# Patient Record
Sex: FEMALE | Race: WHITE | NOT HISPANIC OR LATINO | Employment: OTHER | URBAN - METROPOLITAN AREA
[De-identification: names, ages, dates, MRNs, and addresses within clinical notes are randomized per-mention and may not be internally consistent; named-entity substitution may affect disease eponyms.]

---

## 2017-01-23 ENCOUNTER — CLINICAL SUPPORT (OUTPATIENT)
Dept: FAMILY MEDICINE CLINIC | Facility: CLINIC | Age: 67
End: 2017-01-23

## 2017-01-23 DIAGNOSIS — Z78.0 POSTMENOPAUSAL: Primary | ICD-10-CM

## 2017-01-23 DIAGNOSIS — M81.0 OSTEOPOROSIS: ICD-10-CM

## 2017-01-23 DIAGNOSIS — R29.890 HEIGHT LOSS: ICD-10-CM

## 2017-01-23 PROCEDURE — 77080 DXA BONE DENSITY AXIAL: CPT | Performed by: FAMILY MEDICINE

## 2017-01-23 NOTE — MR AVS SNAPSHOT
Carleen Payne   1/23/2017 11:30 AM   Appointment    Provider:  [facility] MARINA BARRIOS   Department:  [facility]   Dept Phone:  [phone]                Your Full Care Plan              Your Updated Medication List          This list is accurate as of: 1/23/17 12:25 PM.  Always use your most recent med list.                atorvastatin 10 MG tablet   Commonly known as:  LIPITOR   Take 1 tablet by mouth Daily.       Calcium 150 MG tablet       gabapentin 300 MG capsule   Commonly known as:  NEURONTIN   Take 1 capsule by mouth 3 (Three) Times a Day. 1 PO QHS for 3 days, 1 PO BID for 3 days, 1 PO TID thereafter       pseudoephedrine-guaifenesin  MG per 12 hr tablet   Commonly known as:  MUCINEX D   Take 1 tablet by mouth Every 12 (Twelve) Hours.       Vitamin D 1000 UNITS tablet               Instructions     None    Patient Instructions History      MyChart Signup     Our records indicate that you have declined [facility] Claim [facility]art signup. If you would like to sign up for Claim Mapshart, please email [email or web address] or call [phone] to obtain an activation code.             Other Info from Your Visit           Your Appointments     Jan 25, 2017  2:15 PM EST   (Arrive by 1:45 PM EST)   Office Visit with Erik Gomes MD   [facility] (--)    [address],  [address] [address] [phone]           Arrive 15 minutes prior to appointment.              Allergies     Erythromycin Base        Vital Signs     Smoking Status                   Never Smoker

## 2017-01-25 ENCOUNTER — HOSPITAL ENCOUNTER (OUTPATIENT)
Dept: GENERAL RADIOLOGY | Facility: HOSPITAL | Age: 67
Discharge: HOME | End: 2017-01-25
Attending: NEUROLOGICAL SURGERY | Admitting: NEUROLOGICAL SURGERY

## 2017-01-25 DIAGNOSIS — M54.16 LUMBAR RADICULOPATHY: ICD-10-CM

## 2017-01-25 PROCEDURE — 72120 X-RAY BEND ONLY L-S SPINE: CPT

## 2017-02-01 ENCOUNTER — OFFICE VISIT (OUTPATIENT)
Dept: NEUROSURGERY | Facility: CLINIC | Age: 67
End: 2017-02-01

## 2017-02-01 VITALS — HEIGHT: 62 IN | TEMPERATURE: 98.2 F | WEIGHT: 125 LBS | BODY MASS INDEX: 23 KG/M2

## 2017-02-01 DIAGNOSIS — M54.16 LUMBAR RADICULOPATHY: Primary | ICD-10-CM

## 2017-02-01 DIAGNOSIS — M51.36 DEGENERATIVE DISC DISEASE, LUMBAR: ICD-10-CM

## 2017-02-01 DIAGNOSIS — M43.16 SPONDYLOLISTHESIS OF LUMBAR REGION: ICD-10-CM

## 2017-02-01 PROCEDURE — 99213 OFFICE O/P EST LOW 20 MIN: CPT | Performed by: NEUROLOGICAL SURGERY

## 2017-02-01 NOTE — PROGRESS NOTES
Patient: Carleen Payne  : 1950    Primary Care Provider: Priyanka Guardado DO    Requesting Provider: As above      History    Chief Complaint: Back and right leg pain.    History of Present Illness: Ms. 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 With lying down or with walking the pain will extend down into the side of her calf and into the side of the right ankle. She typically does not have left-sided symptoms. Sitting is a more comfortable position for her. In the past she did physical therapy which actually aggravated her symptoms. She does use aspirin from time to time. She reports no numbness. There is no history of bowel or bladder dysfunction. Her symptoms are quite significant in that she is unable to sleep with the symptoms and thus she has become very sleep deprived.  She took the Neurontin for 2-3 weeks but developed swelling in her feet and discontinued the medication.  She continues to have severe pain that extends into her right leg and she remains quite sleep deprived.  She does have a history of osteoporosis.    Review of Systems   Constitutional: Negative for activity change, appetite change, chills, diaphoresis, fatigue, fever and unexpected weight change.   HENT: Negative for congestion, dental problem, drooling, ear discharge, ear pain, facial swelling, hearing loss, mouth sores, nosebleeds, postnasal drip, rhinorrhea, sinus pressure, sneezing, sore throat, tinnitus, trouble swallowing and voice change.    Eyes: Negative for photophobia, pain, discharge, redness, itching and visual disturbance.   Respiratory: Negative for apnea, cough, choking, chest tightness, shortness of breath, wheezing and stridor.    Cardiovascular: Negative for chest pain, palpitations and leg swelling.   Gastrointestinal: Negative for abdominal distention, abdominal pain, anal bleeding, blood in stool, constipation, diarrhea, nausea,  rectal pain and vomiting.   Endocrine: Negative for cold intolerance, heat intolerance, polydipsia, polyphagia and polyuria.   Genitourinary: Negative for decreased urine volume, difficulty urinating, dysuria, enuresis, flank pain, frequency, genital sores, hematuria and urgency.   Musculoskeletal: Positive for back pain. Negative for arthralgias, gait problem, joint swelling, myalgias, neck pain and neck stiffness.   Skin: Negative for color change, pallor, rash and wound.   Allergic/Immunologic: Negative for environmental allergies, food allergies and immunocompromised state.   Neurological: Negative for dizziness, tremors, seizures, syncope, facial asymmetry, speech difficulty, weakness, light-headedness, numbness and headaches.   Hematological: Negative for adenopathy. Does not bruise/bleed easily.   Psychiatric/Behavioral: Negative for agitation, behavioral problems, confusion, decreased concentration, dysphoric mood, hallucinations, self-injury, sleep disturbance and suicidal ideas. The patient is not nervous/anxious and is not hyperactive.        The patient's past medical history, past surgical history, family history, and social history have been reviewed at length in the electronic medical record.    Physical Exam:   There were no vitals taken for this visit.  MUSCULOSKELETAL:  Straight leg raising is negative.  Rodrigo's Sign is negative.  ROM in back normal.  Tenderness in the back to palpation is not observed.  NEUROLOGICAL:  Strength is intact in the lower extremities to direct testing.  Muscle tone is normal throughout.  Station and gait are normal.  Sensation is intact to light touch testing throughout.      Medical Decision Making    Data Review:   Plain flexion extension films of the lumbar spine do indeed demonstrate diminished bone density.  There is a prominent grade 1 listhesis of L4 on L5 that reduces slightly in extension.    Diagnosis:   L4-5 spondylolisthesis, stenosis, and  instability.    Treatment Options:   I have referred the patient to pain management for a selective epidural injection or two on the right at L4-5.  She will follow up thereafter.  If she continues to struggle then we will need to consider decompression and PLIF at L4-5.       Diagnosis Plan   1. Lumbar radiculopathy     2. Spondylolisthesis of lumbar region     3. Degenerative disc disease, lumbar       Scribed for Erik Gomes MD by Sharifa Miles CMA on 02/01/2017 at 10:07 AM    I, Dr. Gomes, personally performed the services described in the documentation, as scribed in my presence, and it is both accurate and complete.

## 2017-05-16 ENCOUNTER — OFFICE VISIT (OUTPATIENT)
Dept: FAMILY MEDICINE CLINIC | Facility: CLINIC | Age: 67
End: 2017-05-16

## 2017-05-16 VITALS
DIASTOLIC BLOOD PRESSURE: 88 MMHG | HEART RATE: 82 BPM | SYSTOLIC BLOOD PRESSURE: 140 MMHG | TEMPERATURE: 97.9 F | HEIGHT: 62 IN | OXYGEN SATURATION: 98 % | BODY MASS INDEX: 23.92 KG/M2 | RESPIRATION RATE: 16 BRPM | WEIGHT: 130 LBS

## 2017-05-16 DIAGNOSIS — IMO0001 ELEVATED BP: Primary | ICD-10-CM

## 2017-05-16 PROCEDURE — 99213 OFFICE O/P EST LOW 20 MIN: CPT | Performed by: FAMILY MEDICINE

## 2017-05-17 ENCOUNTER — OFFICE VISIT (OUTPATIENT)
Dept: NEUROSURGERY | Facility: CLINIC | Age: 67
End: 2017-05-17

## 2017-05-17 VITALS — TEMPERATURE: 98.4 F | HEIGHT: 62 IN | BODY MASS INDEX: 22.45 KG/M2 | WEIGHT: 122 LBS

## 2017-05-17 DIAGNOSIS — M43.10 SPONDYLOLISTHESIS, ACQUIRED: ICD-10-CM

## 2017-05-17 DIAGNOSIS — M48.062 LUMBAR STENOSIS WITH NEUROGENIC CLAUDICATION: ICD-10-CM

## 2017-05-17 DIAGNOSIS — M80.08XA PATHOLOGICAL FRACTURE OF VERTEBRA DUE TO OSTEOPOROSIS, UNSPECIFIED OSTEOPOROSIS TYPE, INITIAL ENCOUNTER (HCC): Primary | ICD-10-CM

## 2017-05-17 DIAGNOSIS — M53.2X6 SPINAL INSTABILITY, LUMBAR: ICD-10-CM

## 2017-05-17 PROCEDURE — 99213 OFFICE O/P EST LOW 20 MIN: CPT | Performed by: NEUROLOGICAL SURGERY

## 2017-06-16 ENCOUNTER — OFFICE VISIT (OUTPATIENT)
Dept: NEUROSURGERY | Facility: CLINIC | Age: 67
End: 2017-06-16

## 2017-06-16 VITALS — BODY MASS INDEX: 22.82 KG/M2 | WEIGHT: 124 LBS | TEMPERATURE: 96.4 F | HEIGHT: 62 IN

## 2017-06-16 DIAGNOSIS — M43.10 SPONDYLOLISTHESIS, ACQUIRED: ICD-10-CM

## 2017-06-16 DIAGNOSIS — M80.08XA PATHOLOGICAL FRACTURE OF VERTEBRA DUE TO OSTEOPOROSIS, UNSPECIFIED OSTEOPOROSIS TYPE, INITIAL ENCOUNTER (HCC): Primary | ICD-10-CM

## 2017-06-16 DIAGNOSIS — M54.16 LUMBAR RADICULOPATHY: ICD-10-CM

## 2017-06-16 DIAGNOSIS — M53.2X6 SPINAL INSTABILITY, LUMBAR: ICD-10-CM

## 2017-06-16 DIAGNOSIS — M48.062 LUMBAR STENOSIS WITH NEUROGENIC CLAUDICATION: ICD-10-CM

## 2017-06-16 PROCEDURE — 99212 OFFICE O/P EST SF 10 MIN: CPT | Performed by: NEUROLOGICAL SURGERY

## 2017-06-16 NOTE — PROGRESS NOTES
Patient: Carleen Payne  : 1950    Primary Care Provider: Priyanka Guardado DO    Requesting Provider: As above      History    Chief Complaint: Low back pain.    History of Present Illness: Ms. 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 Some of the pain extended into the side of her right calf and ankle when she was on her feet. Studies demonstrated an L4-5 spondylolisthesis, stenosis, and instability. She saw Dr. Talbert and had some injections done that completely eradicated her pain.  In early May she was lifting a suitcase that turned out to weight 88 pounds and developed severe back pain. She does have a history of osteoporosis. She denies any bowel or bladder dysfunction. She has no lower extremity symptoms. Fortunately, the patient is made progress.  She has some back soreness but none of the severe pain that she previously experienced.     Review of Systems   Constitutional: Negative for activity change, appetite change, chills, diaphoresis, fatigue, fever and unexpected weight change.   HENT: Negative for congestion, dental problem, drooling, ear discharge, ear pain, facial swelling, hearing loss, mouth sores, nosebleeds, postnasal drip, rhinorrhea, sinus pressure, sneezing, sore throat, tinnitus, trouble swallowing and voice change.    Eyes: Negative for photophobia, pain, discharge, redness, itching and visual disturbance.   Respiratory: Negative for apnea, cough, choking, chest tightness, shortness of breath, wheezing and stridor.    Cardiovascular: Negative for chest pain, palpitations and leg swelling.   Gastrointestinal: Negative for abdominal distention, abdominal pain, anal bleeding, blood in stool, constipation, diarrhea, nausea, rectal pain and vomiting.   Musculoskeletal: Negative for arthralgias, back pain, gait problem, joint swelling, myalgias, neck pain and neck  "stiffness.   Skin: Negative for color change, pallor, rash and wound.   Allergic/Immunologic: Negative for environmental allergies, food allergies and immunocompromised state.   Neurological: Negative for dizziness, tremors, seizures, syncope, facial asymmetry, speech difficulty, weakness, light-headedness, numbness and headaches.   Hematological: Negative for adenopathy. Does not bruise/bleed easily.   Psychiatric/Behavioral: Negative for agitation, behavioral problems, confusion, decreased concentration, dysphoric mood, self-injury, sleep disturbance and suicidal ideas. The patient is not nervous/anxious and is not hyperactive.        The patient's past medical history, past surgical history, family history, and social history have been reviewed at length in the electronic medical record.    Physical Exam:   Temp 96.4 °F (35.8 °C)  Ht 62\" (157.5 cm)  Wt 124 lb (56.2 kg)  BMI 22.68 kg/m2  MUSCULOSKELETAL:  Straight leg raising is negative.  Rodrigo's Sign is negative.  ROM in back normal.  Tenderness in the back to palpation is not observed.  NEUROLOGICAL:  Strength is intact in the lower extremities to direct testing.  Muscle tone is normal throughout.  Station and gait are normal.  Sensation is intact to light touch testing throughout.      Medical Decision Making    Diagnosis:   1.  T12 osteoporotic compression fracture.  2.  L4-5 spondylolisthesis, stenosis, and instability.    Treatment Options:   Ms. Payne is doing better.  I believe that her osteoporotic fracture is going to heal on its own over time.  At this juncture she will follow-up with me on an as-needed basis.       Diagnosis Plan   1. Pathological fracture of vertebra due to osteoporosis, unspecified osteoporosis type, initial encounter     2. Spondylolisthesis, acquired     3. Lumbar stenosis with neurogenic claudication     4. Spinal instability, lumbar     5. Lumbar radiculopathy             I, Dr. Gomes, personally performed the services " described in the documentation, as scribed in my presence, and it is both accurate and complete.  Scribed for Erik Gomes MD by Reggie Lilly CMA on 06/16/2017 at 4:38 PM

## 2017-07-11 ENCOUNTER — OFFICE VISIT (OUTPATIENT)
Dept: FAMILY MEDICINE CLINIC | Facility: CLINIC | Age: 67
End: 2017-07-11

## 2017-07-11 VITALS
DIASTOLIC BLOOD PRESSURE: 90 MMHG | RESPIRATION RATE: 16 BRPM | HEIGHT: 62 IN | BODY MASS INDEX: 22.45 KG/M2 | HEART RATE: 80 BPM | OXYGEN SATURATION: 98 % | SYSTOLIC BLOOD PRESSURE: 130 MMHG | WEIGHT: 122 LBS

## 2017-07-11 DIAGNOSIS — R07.81 RIB PAIN ON LEFT SIDE: ICD-10-CM

## 2017-07-11 DIAGNOSIS — I10 ESSENTIAL HYPERTENSION: Primary | ICD-10-CM

## 2017-07-11 DIAGNOSIS — Z13.820 OSTEOPOROSIS SCREENING: ICD-10-CM

## 2017-07-11 PROCEDURE — 99214 OFFICE O/P EST MOD 30 MIN: CPT | Performed by: FAMILY MEDICINE

## 2017-07-11 RX ORDER — METOPROLOL SUCCINATE 50 MG/1
50 TABLET, EXTENDED RELEASE ORAL DAILY
Qty: 30 TABLET | Refills: 3 | Status: SHIPPED | OUTPATIENT
Start: 2017-07-11 | End: 2017-11-11 | Stop reason: SDUPTHER

## 2017-07-11 RX ORDER — GUAIFENESIN AND PSEUDOEPHEDRINE HCL 1200; 120 MG/1; MG/1
1 TABLET, EXTENDED RELEASE ORAL DAILY
Qty: 30 EACH | Refills: 6 | Status: SHIPPED | OUTPATIENT
Start: 2017-07-11 | End: 2018-05-04

## 2017-07-11 NOTE — PROGRESS NOTES
Subjective   Carleen Payne is a 66 y.o. female.     Hypertension   Chronicity: Has had elevated readings at specialist. The current episode started 1 to 4 weeks ago. The problem is unchanged. The problem is controlled. Pertinent negatives include no anxiety, chest pain, malaise/fatigue, neck pain, palpitations, peripheral edema, PND or shortness of breath. There are no associated agents to hypertension. Risk factors for coronary artery disease include family history. Past treatments include nothing. There are no compliance problems.    Had an episode of pain in left mid back. Denies injury or fever. No urinary symptoms. Has chronic constipation. No chest pain or dyspnea. Has pain to palpation. No OTC treatment. Has a history of osteoporosis . Has not been on treatment for years. Has had multiple rib fractures over the past few years.     The following portions of the patient's history were reviewed and updated as appropriate: allergies, current medications, past family history, past medical history, past social history, past surgical history and problem list.    Review of Systems   Constitutional: Negative for appetite change, chills, diaphoresis, fatigue, fever, malaise/fatigue and unexpected weight change.   HENT: Negative for congestion, ear pain, mouth sores, postnasal drip, rhinorrhea, sinus pressure, sneezing, sore throat and trouble swallowing.    Eyes: Negative for pain, redness and visual disturbance.   Respiratory: Negative for apnea, cough, chest tightness, shortness of breath and wheezing.    Cardiovascular: Negative for chest pain, palpitations, leg swelling and PND.   Gastrointestinal: Negative for abdominal distention, blood in stool, constipation, diarrhea and nausea.   Endocrine: Negative for cold intolerance, polydipsia, polyphagia and polyuria.   Genitourinary: Negative for difficulty urinating, dysuria, enuresis, flank pain and urgency.   Musculoskeletal: Negative for arthralgias, back pain,  joint swelling, myalgias and neck pain.   Skin: Negative for color change, rash and wound.   Neurological: Negative for dizziness, syncope, weakness, light-headedness and numbness.   Hematological: Negative for adenopathy.   Psychiatric/Behavioral: Negative for agitation, behavioral problems and confusion. The patient is not nervous/anxious.        Objective   Physical Exam   Constitutional: She is oriented to person, place, and time. She appears well-developed and well-nourished. No distress.   HENT:   Right Ear: External ear normal.   Left Ear: External ear normal.   Nose: Nose normal.   Mouth/Throat: Oropharynx is clear and moist.   Eyes: Conjunctivae and EOM are normal. Pupils are equal, round, and reactive to light.   Neck: Normal range of motion. Neck supple. No thyromegaly present.   Cardiovascular: Normal rate, regular rhythm and normal heart sounds.    No murmur heard.  Pulmonary/Chest: Effort normal and breath sounds normal. No respiratory distress. She has no wheezes.   Abdominal: Soft. Bowel sounds are normal. She exhibits no distension and no mass. There is no tenderness. There is no rebound and no guarding. No hernia.   Musculoskeletal: Normal range of motion. She exhibits no edema or tenderness.   Lymphadenopathy:     She has no cervical adenopathy.   Neurological: She is alert and oriented to person, place, and time. She has normal reflexes.   Skin: Skin is warm and dry. No rash noted. She is not diaphoretic. No erythema. No pallor.   Psychiatric: She has a normal mood and affect. Her behavior is normal. Judgment and thought content normal.   Nursing note and vitals reviewed.      Assessment/Plan   Carleen was seen today for hypertension.    Diagnoses and all orders for this visit:    Essential hypertension    Rib pain on left side  -     XR ribs 2 vw left; Future    Osteoporosis screening    Other orders  -     metoprolol succinate XL (TOPROL XL) 50 MG 24 hr tablet; Take 1 tablet by mouth Daily.  -      Pseudoephedrine-Guaifenesin ER (MUCINEX D MAX STRENGTH) 120-1200 MG tablet sustained-release 12 hour; Take 1 tablet by mouth Daily.      Will start patient on Toprol for blood pressure. I did discuss with patient that the amount of pseudoephedrine in the Mucinex every day that she is taking could be contributing to her blood pressure being elevated. She wants to continue the Mucinex D because of increased snoring and congestion.  Will start pt on Prolia injections for osteoporosis  I personally spent over half of a total 25 minutes face to face with the patient in counseling and discussion and/or coordination of care as described above.

## 2017-07-17 ENCOUNTER — HOSPITAL ENCOUNTER (OUTPATIENT)
Dept: GENERAL RADIOLOGY | Facility: HOSPITAL | Age: 67
Discharge: HOME | End: 2017-07-17
Attending: FAMILY MEDICINE | Admitting: FAMILY MEDICINE

## 2017-07-17 DIAGNOSIS — R07.81 RIB PAIN ON LEFT SIDE: ICD-10-CM

## 2017-07-17 PROCEDURE — 71100 X-RAY EXAM RIBS UNI 2 VIEWS: CPT

## 2017-09-05 ENCOUNTER — OFFICE VISIT (OUTPATIENT)
Dept: FAMILY MEDICINE CLINIC | Facility: CLINIC | Age: 67
End: 2017-09-05

## 2017-09-05 VITALS
BODY MASS INDEX: 22.63 KG/M2 | DIASTOLIC BLOOD PRESSURE: 94 MMHG | HEART RATE: 76 BPM | OXYGEN SATURATION: 98 % | HEIGHT: 62 IN | WEIGHT: 123 LBS | TEMPERATURE: 97.6 F | SYSTOLIC BLOOD PRESSURE: 142 MMHG

## 2017-09-05 DIAGNOSIS — I10 ESSENTIAL HYPERTENSION: Primary | ICD-10-CM

## 2017-09-05 PROCEDURE — 99214 OFFICE O/P EST MOD 30 MIN: CPT | Performed by: FAMILY MEDICINE

## 2017-09-05 RX ORDER — LOSARTAN POTASSIUM 50 MG/1
50 TABLET ORAL DAILY
Qty: 30 TABLET | Refills: 2 | Status: SHIPPED | OUTPATIENT
Start: 2017-09-05 | End: 2017-11-28 | Stop reason: SDUPTHER

## 2017-09-05 NOTE — PROGRESS NOTES
"Subjective   Carleen Payne is a 67 y.o. female.   C/o bilat flank pain, started after vertebral fracture. Had recent negative xrays. Has constipation. Takes stool softener some relief.  Has a history of a vertebral fracture 5/17 and saw neurosurgery and did not have to have \"cement\" in the vertebrae.  Hypertension   Chronicity: Has rcently started on Toprol. The current episode started 1 to 4 weeks ago. The problem is unchanged. The problem is controlled. Pertinent negatives include no anxiety, chest pain, malaise/fatigue, neck pain, palpitations, peripheral edema, PND or shortness of breath. There are no associated agents to hypertension. Risk factors for coronary artery disease include family history. Past treatments include nothing. There are no compliance problems.         The following portions of the patient's history were reviewed and updated as appropriate: allergies, current medications, past family history, past medical history, past social history, past surgical history and problem list.    Review of Systems   Constitutional: Negative.  Negative for malaise/fatigue.   HENT: Negative.    Eyes: Negative.    Respiratory: Negative.  Negative for shortness of breath.    Cardiovascular: Negative.  Negative for chest pain, palpitations and PND.   Gastrointestinal: Negative.    Endocrine: Negative.    Genitourinary: Negative.    Musculoskeletal: Negative.  Negative for neck pain.   Skin: Negative.    Allergic/Immunologic: Negative.    Neurological: Negative.    Hematological: Negative.    Psychiatric/Behavioral: Negative.    All other systems reviewed and are negative.      Objective   Physical Exam   Constitutional: She is oriented to person, place, and time. She appears well-developed and well-nourished. No distress.   HENT:   Right Ear: External ear normal.   Left Ear: External ear normal.   Nose: Nose normal.   Mouth/Throat: Oropharynx is clear and moist.   Eyes: Conjunctivae and EOM are normal. Pupils are " equal, round, and reactive to light.   Neck: Normal range of motion. Neck supple. No thyromegaly present.   Cardiovascular: Normal rate, regular rhythm and normal heart sounds.    No murmur heard.  Pulmonary/Chest: Effort normal and breath sounds normal. No respiratory distress. She has no wheezes.   Abdominal: Soft. Bowel sounds are normal. She exhibits no distension and no mass. There is no tenderness. There is no rebound and no guarding. No hernia.   Musculoskeletal: Normal range of motion. She exhibits no edema or tenderness.   Lymphadenopathy:     She has no cervical adenopathy.   Neurological: She is alert and oriented to person, place, and time. She has normal reflexes.   Skin: Skin is warm and dry. No rash noted. She is not diaphoretic. No erythema. No pallor.   Psychiatric: She has a normal mood and affect. Her behavior is normal. Judgment and thought content normal.   Nursing note and vitals reviewed.      Assessment/Plan   Carleen was seen today for hypertension.    Diagnoses and all orders for this visit:    Essential hypertension    Other orders  -     losartan (COZAAR) 50 MG tablet; Take 1 tablet by mouth Daily.    Patient will follow up with neurosurgery for possible kyphoplasty.  Patient will continue metoprolol. Losartan 50 mg daily has been added today.    I personally spent over half of a total 25 minutes face to face with the patient in counseling and discussion and/or coordination of care as described above.

## 2017-11-11 RX ORDER — METOPROLOL SUCCINATE 50 MG/1
TABLET, EXTENDED RELEASE ORAL
Qty: 30 TABLET | Refills: 3 | Status: SHIPPED | OUTPATIENT
Start: 2017-11-11 | End: 2017-11-28 | Stop reason: SDUPTHER

## 2017-11-28 ENCOUNTER — OFFICE VISIT (OUTPATIENT)
Dept: FAMILY MEDICINE CLINIC | Facility: CLINIC | Age: 67
End: 2017-11-28

## 2017-11-28 VITALS
BODY MASS INDEX: 23.19 KG/M2 | HEART RATE: 74 BPM | HEIGHT: 62 IN | OXYGEN SATURATION: 98 % | SYSTOLIC BLOOD PRESSURE: 128 MMHG | RESPIRATION RATE: 16 BRPM | DIASTOLIC BLOOD PRESSURE: 88 MMHG | WEIGHT: 126 LBS

## 2017-11-28 DIAGNOSIS — Z13.820 OSTEOPOROSIS SCREENING: ICD-10-CM

## 2017-11-28 DIAGNOSIS — R59.1 LYMPHADENOPATHY OF HEAD AND NECK: ICD-10-CM

## 2017-11-28 DIAGNOSIS — I10 ESSENTIAL HYPERTENSION: Primary | ICD-10-CM

## 2017-11-28 PROCEDURE — 99214 OFFICE O/P EST MOD 30 MIN: CPT | Performed by: FAMILY MEDICINE

## 2017-11-28 RX ORDER — CEPHALEXIN 500 MG/1
500 CAPSULE ORAL 2 TIMES DAILY
Qty: 20 CAPSULE | Refills: 0 | Status: SHIPPED | OUTPATIENT
Start: 2017-11-28 | End: 2018-05-04

## 2017-11-28 RX ORDER — ATORVASTATIN CALCIUM 10 MG/1
10 TABLET, FILM COATED ORAL DAILY
Qty: 30 TABLET | Refills: 5 | Status: SHIPPED | OUTPATIENT
Start: 2017-11-28 | End: 2018-06-06 | Stop reason: SDUPTHER

## 2017-11-28 RX ORDER — METOPROLOL SUCCINATE 50 MG/1
50 TABLET, EXTENDED RELEASE ORAL DAILY
Qty: 30 TABLET | Refills: 5 | Status: SHIPPED | OUTPATIENT
Start: 2017-11-28 | End: 2018-09-04 | Stop reason: SDUPTHER

## 2017-11-28 RX ORDER — LOSARTAN POTASSIUM 50 MG/1
50 TABLET ORAL DAILY
Qty: 30 TABLET | Refills: 5 | Status: SHIPPED | OUTPATIENT
Start: 2017-11-28 | End: 2018-06-06 | Stop reason: SDUPTHER

## 2017-11-28 NOTE — PROGRESS NOTES
Subjective   Carleen Payne is a 67 y.o. female.     Hypertension   Chronicity: Has rcently started on Toprol. The current episode started 1 to 4 weeks ago. The problem is unchanged. The problem is controlled. Pertinent negatives include no anxiety, chest pain, malaise/fatigue, neck pain, palpitations, peripheral edema, PND or shortness of breath. There are no associated agents to hypertension. Risk factors for coronary artery disease include family history. Past treatments include nothing. There are no compliance problems.    Has swelling in neck lymph nodes the past 3 weeks causing some pain. Has had some drainage and allergies. Takes Mucinex. Has some ST, no fever or cough.   Has osteoporosis and has been on  A bisphosphenate for > 5 years and pt would like Prolia.    The following portions of the patient's history were reviewed and updated as appropriate: allergies, current medications, past family history, past medical history, past social history, past surgical history and problem list.    Review of Systems   Constitutional: Negative.  Negative for malaise/fatigue.   HENT: Negative.    Eyes: Negative.    Respiratory: Negative.  Negative for shortness of breath.    Cardiovascular: Negative.  Negative for chest pain, palpitations and PND.   Gastrointestinal: Negative.    Endocrine: Negative.    Genitourinary: Negative.    Musculoskeletal: Negative.  Negative for neck pain.   Skin: Negative.    Allergic/Immunologic: Negative.    Neurological: Negative.    Hematological: Negative.    Psychiatric/Behavioral: Negative.    All other systems reviewed and are negative.      Objective   Physical Exam   Constitutional: She is oriented to person, place, and time. She appears well-developed and well-nourished. No distress.   HENT:   Right Ear: External ear normal.   Left Ear: External ear normal.   Nose: Nose normal.   Mouth/Throat: Oropharynx is clear and moist.   Eyes: Conjunctivae and EOM are normal. Pupils are equal,  round, and reactive to light.   Neck: Normal range of motion. Neck supple. No thyromegaly present.   Cardiovascular: Normal rate, regular rhythm and normal heart sounds.    No murmur heard.  Pulmonary/Chest: Effort normal and breath sounds normal. No respiratory distress. She has no wheezes.   Abdominal: Soft. Bowel sounds are normal. She exhibits no distension and no mass. There is no tenderness. There is no rebound and no guarding. No hernia.   Musculoskeletal: Normal range of motion. She exhibits no edema or tenderness.   Lymphadenopathy:     She has no cervical adenopathy.   Neurological: She is alert and oriented to person, place, and time. She has normal reflexes.   Skin: Skin is warm and dry. No rash noted. She is not diaphoretic. No erythema. No pallor.   Psychiatric: She has a normal mood and affect. Her behavior is normal. Judgment and thought content normal.   Nursing note and vitals reviewed.      Assessment/Plan   Carleen was seen today for hypertension.    Diagnoses and all orders for this visit:    Essential hypertension  -     CBC & Differential  -     Comprehensive Metabolic Panel  -     Lipid Panel  -     TSH    Lymphadenopathy of head and neck    Osteoporosis screening    Other orders  -     atorvastatin (LIPITOR) 10 MG tablet; Take 1 tablet by mouth Daily.  -     losartan (COZAAR) 50 MG tablet; Take 1 tablet by mouth Daily.  -     metoprolol succinate XL (TOPROL-XL) 50 MG 24 hr tablet; Take 1 tablet by mouth Daily.  -     cephalexin (KEFLEX) 500 MG capsule; Take 1 capsule by mouth 2 (Two) Times a Day.      Will try to get Prolia approved for patient for osteoporosis.  I personally spent over half of a total 25 minutes face to face with the patient in counseling and discussion and/or coordination of care as described above.

## 2017-11-29 ENCOUNTER — LAB (OUTPATIENT)
Dept: FAMILY MEDICINE CLINIC | Facility: CLINIC | Age: 67
End: 2017-11-29

## 2017-11-29 DIAGNOSIS — I10 ESSENTIAL HYPERTENSION: Primary | ICD-10-CM

## 2017-11-29 LAB
ALBUMIN SERPL-MCNC: 4.3 G/DL (ref 3.2–4.8)
ALBUMIN/GLOB SERPL: 1.8 G/DL (ref 1.5–2.5)
ALP SERPL-CCNC: 75 U/L (ref 25–100)
ALT SERPL W P-5'-P-CCNC: 18 U/L (ref 7–40)
ANION GAP SERPL CALCULATED.3IONS-SCNC: 9 MMOL/L (ref 3–11)
ARTICHOKE IGE QN: 106 MG/DL (ref 0–130)
AST SERPL-CCNC: 24 U/L (ref 0–33)
BASOPHILS # BLD AUTO: 0.03 10*3/MM3 (ref 0–0.2)
BASOPHILS NFR BLD AUTO: 0.4 % (ref 0–1)
BILIRUB SERPL-MCNC: 0.5 MG/DL (ref 0.3–1.2)
BUN BLD-MCNC: 16 MG/DL (ref 9–23)
BUN/CREAT SERPL: 20 (ref 7–25)
CALCIUM SPEC-SCNC: 9.5 MG/DL (ref 8.7–10.4)
CHLORIDE SERPL-SCNC: 101 MMOL/L (ref 99–109)
CHOLEST SERPL-MCNC: 189 MG/DL (ref 0–200)
CO2 SERPL-SCNC: 30 MMOL/L (ref 20–31)
CREAT BLD-MCNC: 0.8 MG/DL (ref 0.6–1.3)
DEPRECATED RDW RBC AUTO: 47.2 FL (ref 37–54)
EOSINOPHIL # BLD AUTO: 0.27 10*3/MM3 (ref 0–0.3)
EOSINOPHIL NFR BLD AUTO: 3.6 % (ref 0–3)
ERYTHROCYTE [DISTWIDTH] IN BLOOD BY AUTOMATED COUNT: 13.5 % (ref 11.3–14.5)
GFR SERPL CREATININE-BSD FRML MDRD: 72 ML/MIN/1.73
GLOBULIN UR ELPH-MCNC: 2.4 GM/DL
GLUCOSE BLD-MCNC: 156 MG/DL (ref 70–100)
HCT VFR BLD AUTO: 42.7 % (ref 34.5–44)
HDLC SERPL-MCNC: 72 MG/DL (ref 40–60)
HGB BLD-MCNC: 14.8 G/DL (ref 11.5–15.5)
IMM GRANULOCYTES # BLD: 0.02 10*3/MM3 (ref 0–0.03)
IMM GRANULOCYTES NFR BLD: 0.3 % (ref 0–0.6)
LYMPHOCYTES # BLD AUTO: 2.35 10*3/MM3 (ref 0.6–4.8)
LYMPHOCYTES NFR BLD AUTO: 31 % (ref 24–44)
MCH RBC QN AUTO: 33.2 PG (ref 27–31)
MCHC RBC AUTO-ENTMCNC: 34.7 G/DL (ref 32–36)
MCV RBC AUTO: 95.7 FL (ref 80–99)
MONOCYTES # BLD AUTO: 0.64 10*3/MM3 (ref 0–1)
MONOCYTES NFR BLD AUTO: 8.5 % (ref 0–12)
NEUTROPHILS # BLD AUTO: 4.26 10*3/MM3 (ref 1.5–8.3)
NEUTROPHILS NFR BLD AUTO: 56.2 % (ref 41–71)
PLATELET # BLD AUTO: 253 10*3/MM3 (ref 150–450)
PMV BLD AUTO: 10.4 FL (ref 6–12)
POTASSIUM BLD-SCNC: 4.6 MMOL/L (ref 3.5–5.5)
PROT SERPL-MCNC: 6.7 G/DL (ref 5.7–8.2)
RBC # BLD AUTO: 4.46 10*6/MM3 (ref 3.89–5.14)
SODIUM BLD-SCNC: 140 MMOL/L (ref 132–146)
TRIGL SERPL-MCNC: 104 MG/DL (ref 0–150)
TSH SERPL DL<=0.05 MIU/L-ACNC: 0.87 MIU/ML (ref 0.35–5.35)
WBC NRBC COR # BLD: 7.57 10*3/MM3 (ref 3.5–10.8)

## 2017-11-29 PROCEDURE — 80061 LIPID PANEL: CPT | Performed by: FAMILY MEDICINE

## 2017-11-29 PROCEDURE — 80053 COMPREHEN METABOLIC PANEL: CPT | Performed by: FAMILY MEDICINE

## 2017-11-29 PROCEDURE — 85025 COMPLETE CBC W/AUTO DIFF WBC: CPT | Performed by: FAMILY MEDICINE

## 2017-11-29 PROCEDURE — 84443 ASSAY THYROID STIM HORMONE: CPT | Performed by: FAMILY MEDICINE

## 2017-11-29 PROCEDURE — 36415 COLL VENOUS BLD VENIPUNCTURE: CPT | Performed by: FAMILY MEDICINE

## 2017-12-08 ENCOUNTER — LAB (OUTPATIENT)
Dept: FAMILY MEDICINE CLINIC | Facility: CLINIC | Age: 67
End: 2017-12-08

## 2017-12-08 DIAGNOSIS — R73.9 HYPERGLYCEMIA: Primary | ICD-10-CM

## 2017-12-08 LAB — HBA1C MFR BLD: 5.3 %

## 2017-12-08 PROCEDURE — 83036 HEMOGLOBIN GLYCOSYLATED A1C: CPT | Performed by: FAMILY MEDICINE

## 2018-05-04 ENCOUNTER — OFFICE VISIT (OUTPATIENT)
Dept: FAMILY MEDICINE CLINIC | Facility: CLINIC | Age: 68
End: 2018-05-04

## 2018-05-04 VITALS
HEART RATE: 72 BPM | DIASTOLIC BLOOD PRESSURE: 86 MMHG | WEIGHT: 129 LBS | BODY MASS INDEX: 23.74 KG/M2 | SYSTOLIC BLOOD PRESSURE: 126 MMHG | OXYGEN SATURATION: 98 % | HEIGHT: 62 IN | RESPIRATION RATE: 16 BRPM

## 2018-05-04 DIAGNOSIS — H02.9 ABNORMALITY OF EYELID: ICD-10-CM

## 2018-05-04 DIAGNOSIS — Z12.31 ENCOUNTER FOR SCREENING MAMMOGRAM FOR BREAST CANCER: ICD-10-CM

## 2018-05-04 DIAGNOSIS — Z00.00 HEALTH CARE MAINTENANCE: Primary | ICD-10-CM

## 2018-05-04 PROCEDURE — G0402 INITIAL PREVENTIVE EXAM: HCPCS | Performed by: FAMILY MEDICINE

## 2018-05-04 PROCEDURE — G0403 EKG FOR INITIAL PREVENT EXAM: HCPCS | Performed by: FAMILY MEDICINE

## 2018-05-04 PROCEDURE — 99212 OFFICE O/P EST SF 10 MIN: CPT | Performed by: FAMILY MEDICINE

## 2018-05-04 RX ORDER — GUAIFENESIN AND PSEUDOEPHEDRINE HCL 1200; 120 MG/1; MG/1
1 TABLET, EXTENDED RELEASE ORAL DAILY
Qty: 30 EACH | Refills: 5 | Status: SHIPPED | OUTPATIENT
Start: 2018-05-04 | End: 2019-05-28

## 2018-05-04 NOTE — PROGRESS NOTES
QUICK REFERENCE INFORMATION:  The ABCs of the Annual Wellness Visit    Initial Medicare Wellness Visit    HEALTH RISK ASSESSMENT    1950    Recent Hospitalizations:  No hospitalization(s) within the last year..        Current Medical Providers:  Patient Care Team:  Priyanka Guardado DO as PCP - General (Family Medicine)  Priyanka Guardado DO as PCP - Claims Attributed  Priyanka Guardado DO as Referring Physician (Family Medicine)  Ophthalmology  Pain treatment      Smoking Status:  History   Smoking Status   • Never Smoker   Smokeless Tobacco   • Never Used       Alcohol Consumption:  History   Alcohol Use No       Depression Screen:   PHQ-2/PHQ-9 Depression Screening 5/16/2017   Little interest or pleasure in doing things 0   Feeling down, depressed, or hopeless 0   Trouble falling or staying asleep, or sleeping too much 0   Feeling tired or having little energy 0   Poor appetite or overeating 0   Feeling bad about yourself - or that you are a failure or have let yourself or your family down 0   Trouble concentrating on things, such as reading the newspaper or watching television 0   Moving or speaking so slowly that other people could have noticed. Or the opposite - being so fidgety or restless that you have been moving around a lot more than usual 0   Thoughts that you would be better off dead, or of hurting yourself in some way 0   Total Score 0   If you checked off any problems, how difficult have these problems made it for you to do your work, take care of things at home, or get along with other people? Not difficult at all       Health Habits and Functional and Cognitive Screening:  Functional & Cognitive Status 5/4/2018   Do you have difficulty preparing food and eating? No   Do you have difficulty bathing yourself, getting dressed or grooming yourself? No   Do you have difficulty using the toilet? No   Do you have difficulty moving around from place to place? No   Do you have trouble with steps or getting out  of a bed or a chair? No   In the past year have you fallen or experienced a near fall? No   Current Diet Unhealthy Diet   Dental Exam Up to date   Eye Exam Up to date   Exercise (times per week) 0 times per week   Current Exercise Activities Include None   Do you need help using the phone?  No   Are you deaf or do you have serious difficulty hearing?  No   Do you need help with transportation? No   Do you need help shopping? No   Do you need help preparing meals?  No   Do you need help with housework?  No   Do you need help with laundry? No   Do you need help taking your medications? No   Do you need help managing money? No   Do you ever drive or ride in a car without wearing a seat belt? No   Have you felt unusual stress, anger or loneliness in the last month? No   Who do you live with? Spouse   If you need help, do you have trouble finding someone available to you? No   Have you been bothered in the last four weeks by sexual problems? No   Do you have difficulty concentrating, remembering or making decisions? No           Does the patient have evidence of cognitive impairment? No    Asiprin use counseling: Start ASA 81 mg daily       Recent Lab Results:    Visual Acuity:   Visual Acuity Screening    Right eye Left eye Both eyes   Without correction:      With correction: 20/20 20/20 20/20   Comments: Per ophth    Hearing Screening Comments: Normal bilat finger rub test    Age-appropriate Screening Schedule:  Refer to the list below for future screening recommendations based on patient's age, sex and/or medical conditions. Orders for these recommended tests are listed in the plan section. The patient has been provided with a written plan.    Health Maintenance   Topic Date Due   • MAMMOGRAM  01/13/2018   • INFLUENZA VACCINE  08/01/2018   • PAP SMEAR  11/01/2018   • LIPID PANEL  11/29/2018   • DXA SCAN  01/23/2019   • COLONOSCOPY  03/24/2021   • TDAP/TD VACCINES (2 - Td) 09/21/2024   • ZOSTER VACCINE  Completed   •  PNEUMOCOCCAL VACCINES (65+ LOW/MEDIUM RISK)  Excluded        Subjective   History of Present Illness    Carleen Payne is a 67 y.o. female who presents for an Annual Wellness Visit.  1. C/o left eye red. Was seen by eye dr and given steroid eye gtts..Still gets red. Pt concerned it is from sun exposure.  The following portions of the patient's history were reviewed and updated as appropriate: allergies, current medications, past family history, past medical history, past social history, past surgical history and problem list.    Outpatient Medications Prior to Visit   Medication Sig Dispense Refill   • atorvastatin (LIPITOR) 10 MG tablet Take 1 tablet by mouth Daily. 30 tablet 5   • Calcium 150 MG tablet Take  by mouth.     • Cholecalciferol (VITAMIN D) 1000 UNITS tablet Take  by mouth.     • losartan (COZAAR) 50 MG tablet Take 1 tablet by mouth Daily. 30 tablet 5   • metoprolol succinate XL (TOPROL-XL) 50 MG 24 hr tablet Take 1 tablet by mouth Daily. 30 tablet 5   • Pseudoephedrine-Guaifenesin ER (MUCINEX D MAX STRENGTH) 120-1200 MG tablet sustained-release 12 hour Take 1 tablet by mouth Daily. 30 each 6   • cephalexin (KEFLEX) 500 MG capsule Take 1 capsule by mouth 2 (Two) Times a Day. 20 capsule 0     No facility-administered medications prior to visit.        Patient Active Problem List   Diagnosis   • Lumbar radiculopathy   • Right sided sciatica   • Leg pain       Advance Care Planning:  has an advance directive - a copy HAS NOT been provided    Identification of Risk Factors:  Risk factors include: weight , unhealthy diet, cardiovascular risk, increased fall risk and polypharmacy.    Review of Systems   Constitutional: Negative.    HENT: Negative.    Eyes: Negative.    Respiratory: Negative.    Cardiovascular: Negative.    Gastrointestinal: Negative.    Endocrine: Negative.    Genitourinary: Negative.    Musculoskeletal: Negative.    Skin: Negative.    Allergic/Immunologic: Negative.    Neurological: Negative.  "   Hematological: Negative.    Psychiatric/Behavioral: Negative.    All other systems reviewed and are negative.      Compared to one year ago, the patient feels her physical health is the same.  Compared to one year ago, the patient feels her mental health is the same.    Objective     Physical Exam   Constitutional: She is oriented to person, place, and time. She appears well-developed and well-nourished. No distress.   HENT:   Right Ear: External ear normal.   Left Ear: External ear normal.   Nose: Nose normal.   Mouth/Throat: Oropharynx is clear and moist.   Eyes: Conjunctivae and EOM are normal. Pupils are equal, round, and reactive to light.   Neck: Normal range of motion. Neck supple. No thyromegaly present.   Cardiovascular: Normal rate, regular rhythm and normal heart sounds.    No murmur heard.  Pulmonary/Chest: Effort normal and breath sounds normal. No respiratory distress. She has no wheezes. She exhibits no mass and no tenderness. Right breast exhibits no inverted nipple and no mass. Left breast exhibits no inverted nipple and no mass.   Abdominal: Soft. Bowel sounds are normal. She exhibits no distension and no mass. There is no tenderness. There is no rebound and no guarding. No hernia.   Musculoskeletal: Normal range of motion. She exhibits no edema or tenderness.   Lymphadenopathy:     She has no cervical adenopathy.   Neurological: She is alert and oriented to person, place, and time. She has normal reflexes.   Skin: Skin is warm and dry. No rash noted. She is not diaphoretic. No erythema. No pallor.   Psychiatric: She has a normal mood and affect. Her behavior is normal. Judgment and thought content normal.   Nursing note and vitals reviewed.      Vitals:    05/04/18 0949   BP: 126/86   Pulse: 72   Resp: 16   SpO2: 98%   Weight: 58.5 kg (129 lb)   Height: 157.5 cm (62\")   PainSc: 0-No pain     ECG 12 Lead  Date/Time: 5/4/2018 10:19 AM  Performed by: BUCK ISAAC  Authorized by: BUCK ISAAC "   Comparison: not compared with previous ECG   Previous ECG: no previous ECG available  Rhythm: sinus rhythm  Rate: normal  BPM: 70  Conduction: conduction normal  ST Segments: ST segments normal  QRS axis: normal  Other: no other findings  Clinical impression: normal ECG            Patient's Body mass index is 23.59 kg/m². BMI is within normal parameters. No follow-up required.      Assessment/Plan   Patient Self-Management and Personalized Health Advice  The patient has been provided with information about: diet, exercise, weight management, prevention of cardiac or vascular disease, the relationship between weight and GERD and fall prevention and preventive services including:   · Counseling for cardiovascular disease risk reduction, Diabetes screening, see lab orders, Exercise counseling provided, Fall Risk assessment done, Nutrition counseling provided.    Visit Diagnoses:    ICD-10-CM ICD-9-CM   1. Health care maintenance Z00.00 V70.0   2. Abnormality of eyelid H02.9 374.9   3. Encounter for screening mammogram for breast cancer Z12.31 V76.12       Orders Placed This Encounter   Procedures   • Mammo Screening Digital Tomosynthesis Bilateral With CAD     Standing Status:   Future     Standing Expiration Date:   5/4/2019     Order Specific Question:   Reason for Exam:     Answer:   screening   • Ambulatory Referral to Ophthalmology     Referral Priority:   Routine     Referral Type:   Consultation     Referral Reason:   Specialty Services Required     Referred to Provider:   Gurvinder Tee MD     Requested Specialty:   Ophthalmology     Number of Visits Requested:   1   • ECG 12 Lead     This order was created via procedure documentation       Outpatient Encounter Prescriptions as of 5/4/2018   Medication Sig Dispense Refill   • atorvastatin (LIPITOR) 10 MG tablet Take 1 tablet by mouth Daily. 30 tablet 5   • Calcium 150 MG tablet Take  by mouth.     • Cholecalciferol (VITAMIN D) 1000 UNITS tablet Take  by  mouth.     • losartan (COZAAR) 50 MG tablet Take 1 tablet by mouth Daily. 30 tablet 5   • metoprolol succinate XL (TOPROL-XL) 50 MG 24 hr tablet Take 1 tablet by mouth Daily. 30 tablet 5   • [DISCONTINUED] Pseudoephedrine-Guaifenesin ER (MUCINEX D MAX STRENGTH) 120-1200 MG tablet sustained-release 12 hour Take 1 tablet by mouth Daily. 30 each 6   • Pseudoephedrine-Guaifenesin ER (MUCINEX D MAX STRENGTH) 120-1200 MG tablet sustained-release 12 hour Take 1 tablet by mouth Daily. 30 each 5   • [DISCONTINUED] cephalexin (KEFLEX) 500 MG capsule Take 1 capsule by mouth 2 (Two) Times a Day. 20 capsule 0     No facility-administered encounter medications on file as of 5/4/2018.        Reviewed use of high risk medication in the elderly: yes  Reviewed for potential of harmful drug interactions in the elderly: yes    Follow Up:  No Follow-up on file.     An After Visit Summary and PPPS with all of these plans were given to the patient.

## 2018-05-15 RX ORDER — NEOMYCIN SULFATE, POLYMYXIN B SULFATE, BACITRACIN ZINC, HYDROCORTISONE 3.5; 10000; 400; 1 MG/G; [USP'U]/G; [USP'U]/G; MG/G
OINTMENT OPHTHALMIC
Qty: 1 TUBE | Refills: 0 | Status: SHIPPED | OUTPATIENT
Start: 2018-05-15 | End: 2018-08-29 | Stop reason: SDUPTHER

## 2018-05-16 ENCOUNTER — TELEPHONE (OUTPATIENT)
Dept: FAMILY MEDICINE CLINIC | Facility: CLINIC | Age: 68
End: 2018-05-16

## 2018-05-16 NOTE — TELEPHONE ENCOUNTER
----- Message from Priyanka Guardado DO sent at 5/15/2018  5:41 PM EDT -----  Regarding: FW: REFERRAL  Contact: [phone]  I will can in gtts  ----- Message -----  From: Vidhya Goldman MA  Sent: 5/15/2018   2:58 PM  To: Priyanka Guardado DO  Subject: FW: REFERRAL                                     Called pt and she stated that her eye has drainage and was curious if you could get her in any sooner ? I advised her that you may not be able to .   ----- Message -----  From: Eri Flores  Sent: 5/15/2018   9:58 AM  To: Vidhya Goldman MA  Subject: REFERRAL                                         PT CANT GET IN TO SEE DR SPIVEY UNTIL July 11,18. SHE WOULD LIKE TO KNOW IF THERE IS ANYTHING THAT CAN BE DONE TO GET IN SOONER.

## 2018-06-07 RX ORDER — ATORVASTATIN CALCIUM 10 MG/1
10 TABLET, FILM COATED ORAL DAILY
Qty: 30 TABLET | Refills: 5 | Status: SHIPPED | OUTPATIENT
Start: 2018-06-07 | End: 2018-11-27 | Stop reason: SDUPTHER

## 2018-06-07 RX ORDER — LOSARTAN POTASSIUM 50 MG/1
50 TABLET ORAL DAILY
Qty: 30 TABLET | Refills: 5 | Status: SHIPPED | OUTPATIENT
Start: 2018-06-07 | End: 2018-11-27 | Stop reason: SDUPTHER

## 2018-06-08 ENCOUNTER — APPOINTMENT (OUTPATIENT)
Dept: MAMMOGRAPHY | Facility: HOSPITAL | Age: 68
End: 2018-06-08
Attending: FAMILY MEDICINE

## 2018-08-13 ENCOUNTER — HOSPITAL ENCOUNTER (OUTPATIENT)
Dept: MAMMOGRAPHY | Facility: HOSPITAL | Age: 68
Discharge: HOME | End: 2018-08-13
Attending: FAMILY MEDICINE | Admitting: FAMILY MEDICINE

## 2018-08-13 DIAGNOSIS — Z12.31 ENCOUNTER FOR SCREENING MAMMOGRAM FOR BREAST CANCER: ICD-10-CM

## 2018-08-13 PROCEDURE — 77067 SCR MAMMO BI INCL CAD: CPT

## 2018-08-13 PROCEDURE — 77063 BREAST TOMOSYNTHESIS BI: CPT | Performed by: RADIOLOGY

## 2018-08-13 PROCEDURE — 77063 BREAST TOMOSYNTHESIS BI: CPT

## 2018-08-13 PROCEDURE — 77067 SCR MAMMO BI INCL CAD: CPT | Performed by: RADIOLOGY

## 2018-08-29 ENCOUNTER — OFFICE VISIT (OUTPATIENT)
Dept: FAMILY MEDICINE CLINIC | Facility: CLINIC | Age: 68
End: 2018-08-29

## 2018-08-29 VITALS
DIASTOLIC BLOOD PRESSURE: 84 MMHG | SYSTOLIC BLOOD PRESSURE: 122 MMHG | HEART RATE: 70 BPM | RESPIRATION RATE: 16 BRPM | BODY MASS INDEX: 22.56 KG/M2 | OXYGEN SATURATION: 98 % | WEIGHT: 122.6 LBS | HEIGHT: 62 IN

## 2018-08-29 DIAGNOSIS — H10.30 ACUTE CONJUNCTIVITIS, UNSPECIFIED ACUTE CONJUNCTIVITIS TYPE, UNSPECIFIED LATERALITY: Primary | ICD-10-CM

## 2018-08-29 PROCEDURE — 99213 OFFICE O/P EST LOW 20 MIN: CPT | Performed by: FAMILY MEDICINE

## 2018-08-29 RX ORDER — NEOMYCIN SULFATE, POLYMYXIN B SULFATE, BACITRACIN ZINC, HYDROCORTISONE 3.5; 10000; 400; 1 MG/G; [USP'U]/G; [USP'U]/G; MG/G
OINTMENT OPHTHALMIC
Qty: 1 TUBE | Refills: 0 | OUTPATIENT
Start: 2018-08-29 | End: 2019-08-15

## 2018-08-29 NOTE — PROGRESS NOTES
Subjective   Carleen Payne is a 68 y.o. female.     Eye Problem    The left (has irritation, lid irritation and green drainage 4 times this year) eye is affected. This is a recurrent problem. The current episode started in the past 7 days. The problem occurs intermittently. The problem has been unchanged. There was no injury mechanism. The pain is at a severity of 0/10. The patient is experiencing no pain. There is no known exposure to pink eye. She does not wear contacts. Associated symptoms include eye redness. Pertinent negatives include no blurred vision, eye discharge, double vision, fever, foreign body sensation or nausea. Treatments tried: steroid eye drop. The treatment provided moderate relief.        The following portions of the patient's history were reviewed and updated as appropriate: allergies, current medications, past family history, past medical history, past social history, past surgical history and problem list.    Review of Systems   Constitutional: Negative.  Negative for activity change, fatigue, fever, unexpected weight gain and unexpected weight loss.   HENT: Negative.  Negative for congestion, sneezing and sore throat.    Eyes: Positive for redness. Negative for blurred vision, double vision, discharge and visual disturbance.   Respiratory: Negative.  Negative for cough, chest tightness, shortness of breath and wheezing.    Cardiovascular: Negative.  Negative for chest pain, palpitations and leg swelling.   Gastrointestinal: Negative.  Negative for abdominal distention, abdominal pain, blood in stool, constipation, diarrhea and nausea.   Endocrine: Negative.  Negative for cold intolerance and heat intolerance.   Genitourinary: Negative.  Negative for urinary incontinence, dysuria, frequency and urgency.   Musculoskeletal: Negative.  Negative for arthralgias and myalgias.   Skin: Negative.  Negative for rash.   Allergic/Immunologic: Negative.    Neurological: Negative.  Negative for  dizziness, syncope, numbness and memory problem.   Hematological: Negative.  Negative for adenopathy.   Psychiatric/Behavioral: Negative.  Negative for suicidal ideas and depressed mood. The patient is not nervous/anxious.    All other systems reviewed and are negative.      Objective   Physical Exam   Constitutional: She is oriented to person, place, and time. She appears well-developed and well-nourished.   HENT:   Head: Normocephalic.   Right Ear: External ear normal.   Left Ear: External ear normal.   Nose: Nose normal.   Mouth/Throat: Oropharynx is clear and moist. No oropharyngeal exudate.   Eyes: Pupils are equal, round, and reactive to light. EOM are normal. Left conjunctiva is injected.   Neck: Normal range of motion. Neck supple. No thyromegaly present.   Cardiovascular: Normal rate, regular rhythm, normal heart sounds and intact distal pulses.    No murmur heard.  Pulmonary/Chest: Effort normal and breath sounds normal. No respiratory distress. She exhibits no tenderness.   Abdominal: Soft. Bowel sounds are normal. She exhibits no distension and no mass. There is no tenderness. There is no rebound and no guarding.   Musculoskeletal: Normal range of motion.   Lymphadenopathy:     She has no cervical adenopathy.   Neurological: She is alert and oriented to person, place, and time. She has normal reflexes. She displays normal reflexes. She exhibits normal muscle tone. Coordination normal.   Skin: Skin is warm and dry. No rash noted. She is not diaphoretic. No erythema.   Psychiatric: She has a normal mood and affect. Her behavior is normal. Judgment and thought content normal.   Nursing note and vitals reviewed.        Assessment/Plan   Carleen was seen today for eye problem.    Diagnoses and all orders for this visit:    Acute conjunctivitis, unspecified acute conjunctivitis type, unspecified laterality    Other orders  -     neomycin-bacitracin-polymyxin-hydrocortisone (CORTISPORIN) 1 % ophthalmic ointment;  Use 1/2 in strip on lower lid to affected eye (s)        Pt will self refer to [facility]

## 2018-09-04 RX ORDER — METOPROLOL SUCCINATE 50 MG/1
50 TABLET, EXTENDED RELEASE ORAL DAILY
Qty: 30 TABLET | Refills: 5 | Status: SHIPPED | OUTPATIENT
Start: 2018-09-04 | End: 2019-02-19 | Stop reason: SDUPTHER

## 2018-11-27 RX ORDER — LOSARTAN POTASSIUM 50 MG/1
50 TABLET ORAL DAILY
Qty: 30 TABLET | Refills: 5 | Status: SHIPPED | OUTPATIENT
Start: 2018-11-27 | End: 2019-05-28 | Stop reason: SDUPTHER

## 2018-11-27 RX ORDER — ATORVASTATIN CALCIUM 10 MG/1
10 TABLET, FILM COATED ORAL DAILY
Qty: 30 TABLET | Refills: 5 | Status: SHIPPED | OUTPATIENT
Start: 2018-11-27 | End: 2019-05-28 | Stop reason: SDUPTHER

## 2019-02-19 RX ORDER — METOPROLOL SUCCINATE 50 MG/1
50 TABLET, EXTENDED RELEASE ORAL DAILY
Qty: 30 TABLET | Refills: 5 | Status: SHIPPED | OUTPATIENT
Start: 2019-02-19 | End: 2019-05-28 | Stop reason: SDUPTHER

## 2019-05-28 ENCOUNTER — TRANSCRIBE ORDERS (OUTPATIENT)
Dept: FAMILY MEDICINE CLINIC | Facility: CLINIC | Age: 69
End: 2019-05-28

## 2019-05-28 ENCOUNTER — OFFICE VISIT (OUTPATIENT)
Dept: FAMILY MEDICINE CLINIC | Facility: CLINIC | Age: 69
End: 2019-05-28

## 2019-05-28 VITALS
OXYGEN SATURATION: 97 % | HEIGHT: 62 IN | TEMPERATURE: 98.3 F | SYSTOLIC BLOOD PRESSURE: 120 MMHG | RESPIRATION RATE: 18 BRPM | BODY MASS INDEX: 22.45 KG/M2 | WEIGHT: 122 LBS | DIASTOLIC BLOOD PRESSURE: 82 MMHG | HEART RATE: 78 BPM

## 2019-05-28 DIAGNOSIS — E78.2 MIXED HYPERLIPIDEMIA: ICD-10-CM

## 2019-05-28 DIAGNOSIS — Z78.0 POSTMENOPAUSAL: ICD-10-CM

## 2019-05-28 DIAGNOSIS — Z12.31 VISIT FOR SCREENING MAMMOGRAM: Primary | ICD-10-CM

## 2019-05-28 DIAGNOSIS — I10 ESSENTIAL HYPERTENSION: ICD-10-CM

## 2019-05-28 DIAGNOSIS — M54.31 RIGHT SIDED SCIATICA: ICD-10-CM

## 2019-05-28 DIAGNOSIS — Z00.00 HEALTH CARE MAINTENANCE: Primary | ICD-10-CM

## 2019-05-28 LAB
ALBUMIN SERPL-MCNC: 4.4 G/DL (ref 3.5–5.2)
ALBUMIN/GLOB SERPL: 1.8 G/DL
ALP SERPL-CCNC: 78 U/L (ref 39–117)
ALT SERPL W P-5'-P-CCNC: 15 U/L (ref 1–33)
ANION GAP SERPL CALCULATED.3IONS-SCNC: 11.5 MMOL/L
AST SERPL-CCNC: 22 U/L (ref 1–32)
BASOPHILS # BLD AUTO: 0.03 10*3/MM3 (ref 0–0.2)
BASOPHILS NFR BLD AUTO: 0.4 % (ref 0–1.5)
BILIRUB SERPL-MCNC: 0.3 MG/DL (ref 0.2–1.2)
BUN BLD-MCNC: 21 MG/DL (ref 8–23)
BUN/CREAT SERPL: 25.3 (ref 7–25)
CALCIUM SPEC-SCNC: 9.3 MG/DL (ref 8.6–10.5)
CHLORIDE SERPL-SCNC: 103 MMOL/L (ref 98–107)
CHOLEST SERPL-MCNC: 192 MG/DL (ref 0–200)
CO2 SERPL-SCNC: 26.5 MMOL/L (ref 22–29)
CREAT BLD-MCNC: 0.83 MG/DL (ref 0.57–1)
DEPRECATED RDW RBC AUTO: 45.3 FL (ref 37–54)
EOSINOPHIL # BLD AUTO: 0.21 10*3/MM3 (ref 0–0.4)
EOSINOPHIL NFR BLD AUTO: 2.9 % (ref 0.3–6.2)
ERYTHROCYTE [DISTWIDTH] IN BLOOD BY AUTOMATED COUNT: 12.7 % (ref 12.3–15.4)
GFR SERPL CREATININE-BSD FRML MDRD: 69 ML/MIN/1.73
GLOBULIN UR ELPH-MCNC: 2.5 GM/DL
GLUCOSE BLD-MCNC: 90 MG/DL (ref 65–99)
HCT VFR BLD AUTO: 46.2 % (ref 34–46.6)
HDLC SERPL-MCNC: 64 MG/DL (ref 40–60)
HGB BLD-MCNC: 14.9 G/DL (ref 12–15.9)
IMM GRANULOCYTES # BLD AUTO: 0.01 10*3/MM3 (ref 0–0.05)
IMM GRANULOCYTES NFR BLD AUTO: 0.1 % (ref 0–0.5)
LDLC SERPL CALC-MCNC: 109 MG/DL (ref 0–100)
LDLC/HDLC SERPL: 1.7 {RATIO}
LYMPHOCYTES # BLD AUTO: 1.82 10*3/MM3 (ref 0.7–3.1)
LYMPHOCYTES NFR BLD AUTO: 24.9 % (ref 19.6–45.3)
MCH RBC QN AUTO: 31 PG (ref 26.6–33)
MCHC RBC AUTO-ENTMCNC: 32.3 G/DL (ref 31.5–35.7)
MCV RBC AUTO: 96.3 FL (ref 79–97)
MONOCYTES # BLD AUTO: 0.86 10*3/MM3 (ref 0.1–0.9)
MONOCYTES NFR BLD AUTO: 11.8 % (ref 5–12)
NEUTROPHILS # BLD AUTO: 4.37 10*3/MM3 (ref 1.7–7)
NEUTROPHILS NFR BLD AUTO: 59.9 % (ref 42.7–76)
NRBC BLD AUTO-RTO: 0 /100 WBC (ref 0–0.2)
PLATELET # BLD AUTO: 264 10*3/MM3 (ref 140–450)
PMV BLD AUTO: 10.4 FL (ref 6–12)
POTASSIUM BLD-SCNC: 4.1 MMOL/L (ref 3.5–5.2)
PROT SERPL-MCNC: 6.9 G/DL (ref 6–8.5)
RBC # BLD AUTO: 4.8 10*6/MM3 (ref 3.77–5.28)
SODIUM BLD-SCNC: 141 MMOL/L (ref 136–145)
TRIGL SERPL-MCNC: 95 MG/DL (ref 0–150)
TSH SERPL DL<=0.05 MIU/L-ACNC: 0.66 MIU/ML (ref 0.27–4.2)
VLDLC SERPL-MCNC: 19 MG/DL (ref 5–40)
WBC NRBC COR # BLD: 7.3 10*3/MM3 (ref 3.4–10.8)

## 2019-05-28 PROCEDURE — 85025 COMPLETE CBC W/AUTO DIFF WBC: CPT | Performed by: FAMILY MEDICINE

## 2019-05-28 PROCEDURE — G0444 DEPRESSION SCREEN ANNUAL: HCPCS | Performed by: FAMILY MEDICINE

## 2019-05-28 PROCEDURE — G0439 PPPS, SUBSEQ VISIT: HCPCS | Performed by: FAMILY MEDICINE

## 2019-05-28 PROCEDURE — 36415 COLL VENOUS BLD VENIPUNCTURE: CPT | Performed by: FAMILY MEDICINE

## 2019-05-28 PROCEDURE — 80053 COMPREHEN METABOLIC PANEL: CPT | Performed by: FAMILY MEDICINE

## 2019-05-28 PROCEDURE — 84443 ASSAY THYROID STIM HORMONE: CPT | Performed by: FAMILY MEDICINE

## 2019-05-28 PROCEDURE — 80061 LIPID PANEL: CPT | Performed by: FAMILY MEDICINE

## 2019-05-28 RX ORDER — ATORVASTATIN CALCIUM 10 MG/1
10 TABLET, FILM COATED ORAL DAILY
Qty: 90 TABLET | Refills: 3 | Status: SHIPPED | OUTPATIENT
Start: 2019-05-28 | End: 2020-06-02 | Stop reason: SDUPTHER

## 2019-05-28 RX ORDER — CYCLOPENTOLATE HYDROCHLORIDE 10 MG/ML
SOLUTION/ DROPS OPHTHALMIC
Refills: 0 | COMMUNITY
Start: 2019-05-22 | End: 2020-02-19

## 2019-05-28 RX ORDER — METOPROLOL SUCCINATE 50 MG/1
50 TABLET, EXTENDED RELEASE ORAL DAILY
Qty: 90 TABLET | Refills: 3 | Status: SHIPPED | OUTPATIENT
Start: 2019-05-28 | End: 2020-06-02 | Stop reason: SDUPTHER

## 2019-05-28 RX ORDER — LOSARTAN POTASSIUM 50 MG/1
50 TABLET ORAL DAILY
Qty: 90 TABLET | Refills: 3 | Status: SHIPPED | OUTPATIENT
Start: 2019-05-28 | End: 2020-06-02 | Stop reason: SDUPTHER

## 2019-05-28 RX ORDER — ERYTHROMYCIN 5 MG/G
OINTMENT OPHTHALMIC
Refills: 1 | COMMUNITY
Start: 2019-05-22 | End: 2019-08-15

## 2019-05-28 NOTE — PROGRESS NOTES
Subsequent Medicare Wellness Visit   The ABC's of the Annual Wellness Visit    Chief Complaint   Patient presents with   • Medicare Wellness-subsequent       HPI:  Carleen Payne, -1950, is a 68 y.o. female who presents for a Subsequent Medicare Wellness Visit.    Recent Hospitalizations:  No hospitalization(s) within the last year..    Current Medical Providers:  Patient Care Team:  Priyanka Guardado DO as PCP - General (Family Medicine)  Priyanka Guardado DO as PCP - Claims Attributed  Priyanka Guardado DO as Referring Physician (Family Medicine)    Health Habits and Functional and Cognitive Screening and Depression Screening:  Functional & Cognitive Status 2019   Do you have difficulty preparing food and eating? No   Do you have difficulty bathing yourself, getting dressed or grooming yourself? No   Do you have difficulty using the toilet? No   Do you have difficulty moving around from place to place? No   Do you have trouble with steps or getting out of a bed or a chair? No   In the past year have you fallen or experienced a near fall? No   Current Diet Unhealthy Diet   Dental Exam Up to date   Eye Exam Up to date   Exercise (times per week) 0 times per week   Current Exercise Activities Include None   Do you need help using the phone?  No   Are you deaf or do you have serious difficulty hearing?  No   Do you need help with transportation? No   Do you need help shopping? No   Do you need help preparing meals?  No   Do you need help with housework?  No   Do you need help with laundry? No   Do you need help taking your medications? No   Do you need help managing money? No   Do you ever drive or ride in a car without wearing a seat belt? No   Have you felt unusual stress, anger or loneliness in the last month? No   Who do you live with? Spouse   If you need help, do you have trouble finding someone available to you? No   Have you been bothered in the last four weeks by sexual problems? No   Do you have  difficulty concentrating, remembering or making decisions? No       Compared to one year ago, the patient feels her physical health is the same and her mental health is the same.    Depression Screen:  PHQ-2/PHQ-9 Depression Screening 5/28/2019   Little interest or pleasure in doing things 0   Feeling down, depressed, or hopeless 0   Trouble falling or staying asleep, or sleeping too much 0   Feeling tired or having little energy 0   Poor appetite or overeating 0   Feeling bad about yourself - or that you are a failure or have let yourself or your family down 0   Trouble concentrating on things, such as reading the newspaper or watching television 0   Moving or speaking so slowly that other people could have noticed. Or the opposite - being so fidgety or restless that you have been moving around a lot more than usual 0   Thoughts that you would be better off dead, or of hurting yourself in some way 0   Total Score 0   If you checked off any problems, how difficult have these problems made it for you to do your work, take care of things at home, or get along with other people? Not difficult at all         Past Medical/Family/Social History:  The following portions of the patient's history were reviewed and updated as appropriate: allergies, current medications, past family history, past medical history, past social history, past surgical history and problem list.    Allergies   Allergen Reactions   • Erythromycin Base    • Gabapentin Swelling         Current Outpatient Medications:   •  atorvastatin (LIPITOR) 10 MG tablet, Take 1 tablet by mouth Daily., Disp: 90 tablet, Rfl: 3  •  Calcium 150 MG tablet, Take  by mouth., Disp: , Rfl:   •  Cholecalciferol (VITAMIN D) 1000 UNITS tablet, Take  by mouth., Disp: , Rfl:   •  cyclopentolate (CYCLOGYL) 1 % ophthalmic solution, INSTILL 1 DROP UPON AWAKENING, 1 DROP WHEN LEAVING HOME, AND 1 DROP UPON ARRIVAL, Disp: , Rfl: 0  •  erythromycin (ROMYCIN) 5 MG/GM ophthalmic  ointment, APPLY 1/4 INCH STRIP TWICE DAILY TO OPERATIVE EYE 3 DAYS PRIOR TO SURGERY BUT NOT THE MORNING OF SUGERY, Disp: , Rfl: 1  •  loratadine-pseudoephedrine (CLARITIN-D 24-hour)  MG per 24 hr tablet, Take 1 tablet by mouth Daily., Disp: 90 tablet, Rfl: 3  •  losartan (COZAAR) 50 MG tablet, Take 1 tablet by mouth Daily., Disp: 90 tablet, Rfl: 3  •  metoprolol succinate XL (TOPROL-XL) 50 MG 24 hr tablet, Take 1 tablet by mouth Daily., Disp: 90 tablet, Rfl: 3  •  neomycin-bacitracin-polymyxin-hydrocortisone (CORTISPORIN) 1 % ophthalmic ointment, Use 1/2 in strip on lower lid to affected eye (s), Disp: 1 tube, Rfl: 0    Aspirin use counseling: Does not need ASA (and currently is not on it)    Current medication list contains high risk medications. No harmful drug interactions have been identified.  Plan of action med management    Family History   Problem Relation Age of Onset   • Hypertension Mother    • Hypertension Father    • Stroke Father    • Breast cancer Neg Hx        Social History     Tobacco Use   • Smoking status: Never Smoker   • Smokeless tobacco: Never Used   Substance Use Topics   • Alcohol use: No       History reviewed. No pertinent surgical history.    Patient Active Problem List   Diagnosis   • Lumbar radiculopathy   • Right sided sciatica   • Leg pain       Review of Systems   Constitutional: Negative.  Negative for activity change, fatigue, fever and unexpected weight change.   HENT: Negative.  Negative for congestion, sneezing and sore throat.    Eyes: Negative.  Negative for visual disturbance.   Respiratory: Negative.  Negative for cough, chest tightness, shortness of breath and wheezing.    Cardiovascular: Negative.  Negative for chest pain, palpitations and leg swelling.   Gastrointestinal: Negative.  Negative for abdominal distention, abdominal pain, blood in stool, constipation, diarrhea and nausea.   Endocrine: Negative.  Negative for cold intolerance and heat intolerance.  "  Genitourinary: Negative.  Negative for dysuria, frequency and urgency.   Musculoskeletal: Negative.  Negative for arthralgias and myalgias.   Skin: Negative.  Negative for rash.   Allergic/Immunologic: Negative.    Neurological: Negative.  Negative for dizziness, syncope and numbness.   Hematological: Negative.  Negative for adenopathy.   Psychiatric/Behavioral: Negative.  Negative for suicidal ideas. The patient is not nervous/anxious.        Objective     Vitals:    05/28/19 0835   BP: 120/82   BP Location: Left arm   Patient Position: Sitting   Cuff Size: Adult   Pulse: 78   Resp: 18   Temp: 98.3 °F (36.8 °C)   TempSrc: Temporal   SpO2: 97%   Weight: 55.3 kg (122 lb)   Height: 157.5 cm (62\")   PainSc: 0-No pain       Patient's Body mass index is 22.31 kg/m². BMI is within normal parameters. No follow-up required..      Hearing Screening Comments: Normal hearing  Vision Screening Comments: Per ophth    The patient has no evidence of cognitve impairment.     Physical Exam   Constitutional: She is oriented to person, place, and time. She appears well-developed and well-nourished.   HENT:   Head: Normocephalic.   Right Ear: External ear normal.   Left Ear: External ear normal.   Nose: Nose normal.   Mouth/Throat: Oropharynx is clear and moist. No oropharyngeal exudate.   Eyes: Conjunctivae and EOM are normal. Pupils are equal, round, and reactive to light.   Neck: Normal range of motion. Neck supple. No thyromegaly present.   Cardiovascular: Normal rate, regular rhythm, normal heart sounds and intact distal pulses.   No murmur heard.  Pulmonary/Chest: Effort normal and breath sounds normal. No respiratory distress. She exhibits no tenderness.   Abdominal: Soft. Bowel sounds are normal. She exhibits no distension and no mass. There is no tenderness. There is no rebound and no guarding.   Musculoskeletal: Normal range of motion.   Lymphadenopathy:     She has no cervical adenopathy.   Neurological: She is alert and " oriented to person, place, and time. She has normal reflexes. She displays normal reflexes. She exhibits normal muscle tone. Coordination normal.   Skin: Skin is warm and dry. No rash noted. She is not diaphoretic. No erythema.   Psychiatric: She has a normal mood and affect. Her behavior is normal. Judgment and thought content normal.   Nursing note and vitals reviewed.      Recent Lab Results:     Lab Results   Component Value Date    CHOL 189 11/29/2017    TRIG 104 11/29/2017    HDL 72 (H) 11/29/2017       Assessment/Plan   Age-appropriate Screening Schedule:  Refer to the list below for future screening recommendations based on patient's age, sex and/or medical conditions.      Health Maintenance   Topic Date Due   • ZOSTER VACCINE (2 of 2) 11/03/2013   • LIPID PANEL  11/29/2018   • DXA SCAN  01/23/2019   • INFLUENZA VACCINE  08/01/2019   • MAMMOGRAM  08/13/2020   • COLONOSCOPY  03/24/2021   • TDAP/TD VACCINES (2 - Td) 09/21/2024   • PNEUMOCOCCAL VACCINES (65+ LOW/MEDIUM RISK)  Discontinued       Medicare Risks and Personalized Health Plan:  unhealthy diet, cardiovascular risk, increased fall risk and polypharmacy      CMS-Preventive Services Quick Reference  Medicare Preventive Services Addressed:  Bone densitometry screening, Diabetes screening, see lab orders, Exercise counseling provided, Fall Risk assessment done, Nutrition counseling provided    Advance Care Planning:  Patient has an advance directive - a copy has not been provided. Have asked the patient to send this to us to add to record    Diagnoses and all orders for this visit:    1. Health care maintenance (Primary)  -     CBC & Differential  -     Comprehensive Metabolic Panel  -     Lipid Panel  -     TSH  -     CBC Auto Differential    2. Right sided sciatica  -     Ambulatory Referral to Pain Management    3. Essential hypertension  -     CBC & Differential  -     Comprehensive Metabolic Panel  -     Lipid Panel  -     TSH  -     losartan  (COZAAR) 50 MG tablet; Take 1 tablet by mouth Daily.  Dispense: 90 tablet; Refill: 3  -     metoprolol succinate XL (TOPROL-XL) 50 MG 24 hr tablet; Take 1 tablet by mouth Daily.  Dispense: 90 tablet; Refill: 3  -     CBC Auto Differential    4. Postmenopausal  -     DEXA Bone Density Axial; Future    5. Mixed hyperlipidemia  -     atorvastatin (LIPITOR) 10 MG tablet; Take 1 tablet by mouth Daily.  Dispense: 90 tablet; Refill: 3    Other orders  -     loratadine-pseudoephedrine (CLARITIN-D 24-hour)  MG per 24 hr tablet; Take 1 tablet by mouth Daily.  Dispense: 90 tablet; Refill: 3        An After Visit Summary and PPPS with all of these plans were given to the patient.    Annual depression screen complete. 15 minutes.    Follow Up:  No Follow-up on file.

## 2019-05-29 ENCOUNTER — TELEPHONE (OUTPATIENT)
Dept: PAIN MEDICINE | Facility: CLINIC | Age: 69
End: 2019-05-29

## 2019-06-13 NOTE — PROGRESS NOTES
"Chief Complaint: \"Pain in my lower back, in my tailbone.\"      History of Present Illness:   Patient: Ms. Carleen Payne, 68 y.o. female   Referring physician: Dr. Priyanka Guardado   Reason for referral: Consultation for intractable chronic lower back pain.   Pain history: Patient reports a 16-year history of lower back pain, which began after a fall. Patient used to see Dr Tan Talbert, and received 6 epidurals with significant relief. Her last epidural was in 09/2018 with significant ongoing pain relief. Patient just wants to establish care with a new pain management specialist  Pain description: constant pain with intermittent exacerbation, described as aching and burning sensation.   Radiation of pain: The lower back \"tailbone\" pain radiates into the posterior aspect of the right thigh, right calf to the ankle   Pain intensity today: 1/10  Average pain intensity last week: 1/10  Pain intensity ranges from: 0.5/10 to 1/10  Aggravating factors: Pain increases with lying down  Alleviating factors: Pain decreases with analgesics, sitting    Associated symptoms:   Patient denies numbness or weakness in the lower extremities  Patient denies any new bladder or bowel problems.   Patient denies difficulties with her balance or recent falls     Review of previous therapies and additional medical records:  Carleen Payne has already failed the following measures, including:   Conservative measures: oral analgesics and physical therapy   Interventional measures: former patient of Dr. Talbert. She underwent pidural injections (RT L4-L5 transforaminals x4) with significant relief  Surgical measures: No history of lumbar spine surgery  Carleen Payne underwent neurosurgical consultation with Dr. Gomes on 06/16/2017, and was found not to be a surgical candidate.  Carleen Payne is a healthy adult other than her chronic pain and HTN.  In terms of current analgesics, Carleen Payne takes: aspirin PRN  I have reviewed Anatoly Report " #[id number] consistent to medication reconciliation.     Global Pain Scale 06-20 2019                  Pain  1                 Feelings  0                 Clinical outcomes  0                 Activities  0                 GPS Total:  3                    Review of Diagnostic Studies:    XR SPINE LUMBAR FLEX AND EXT- 01/25/2017: There is considerable anterolisthesis of L4 forward from L5 with reduction of L4-L5 disc space and there is trace anterolisthesis of L5 forward from S1. Flexion and extension images reveal no evidence of instability   MRI of the lumbar spine without contrast on November 2, 2016: Radiology report. Mild disc desiccation at all levels. There are sagittal annular fissures at the lower 4 lumbar levels.  Axial imaging:  T12-L1: Small posterior left paracentral disc protrusion. Mild effacement of the ventral leftward thecal sac without significant spinal canal or NF stenosis   L1-L2: negative  L2-L3 and L3-L4: Mild lateral disc bulging.  No canal or NF stenosis  L4-L5: Moderate facet hypertrophy associated with bilateral joint effusions and a grade 1 anterolisthesis of L4 on L5. Mild circumferential disc bulge with mild superior pseudo-protrusion of disc material.  Mild narrowing of the neuroforamina at the thecal sac. There is significant narrowing of the superior right subarticular zone with effacement of the right L5 nerve root.  There is mild to moderate narrowing of the superior left subarticular zone with mild effacement of the left L5 nerve root sheath.  L5-S1: Mild disc space narrowing and mild facet hypertrophy. Mild right and mild to moderate left subarticular zone narrowing just above the level of the pedicles.  Mild effacement of the left S1 nerve root.  There may be slight effacement of the right S1 nerve root.  No narrowing of the thecal sac.  Mild neuroforaminal stenosis    Review of Systems   Musculoskeletal: Positive for back pain.   All other systems reviewed and are negative.         Patient Active Problem List   Diagnosis   • Lumbar radiculopathy   • Right sided sciatica   • Leg pain   • Lumbar stenosis with neurogenic claudication   • History of compression fracture of vertebral column   • Osteoporosis   • Spondylolisthesis of lumbar region   • DDD (degenerative disc disease), lumbar       Past Medical History:   Diagnosis Date   • Hyperlipidemia    • Osteoporosis          History reviewed. No pertinent surgical history.      Family History   Problem Relation Age of Onset   • Hypertension Mother    • Hypertension Father    • Stroke Father    • Breast cancer Neg Hx          Social History     Socioeconomic History   • Marital status:      Spouse name: Not on file   • Number of children: Not on file   • Years of education: Not on file   • Highest education level: Not on file   Tobacco Use   • Smoking status: Never Smoker   • Smokeless tobacco: Never Used   Substance and Sexual Activity   • Alcohol use: No   • Drug use: No   • Sexual activity: Yes     Partners: Male           Current Outpatient Medications:   •  atorvastatin (LIPITOR) 10 MG tablet, Take 1 tablet by mouth Daily., Disp: 90 tablet, Rfl: 3  •  Calcium 150 MG tablet, Take  by mouth., Disp: , Rfl:   •  Cholecalciferol (VITAMIN D) 1000 UNITS tablet, Take  by mouth., Disp: , Rfl:   •  cyclopentolate (CYCLOGYL) 1 % ophthalmic solution, INSTILL 1 DROP UPON AWAKENING, 1 DROP WHEN LEAVING HOME, AND 1 DROP UPON ARRIVAL, Disp: , Rfl: 0  •  erythromycin (ROMYCIN) 5 MG/GM ophthalmic ointment, APPLY 1/4 INCH STRIP TWICE DAILY TO OPERATIVE EYE 3 DAYS PRIOR TO SURGERY BUT NOT THE MORNING OF SUGERY, Disp: , Rfl: 1  •  loratadine-pseudoephedrine (CLARITIN-D 24-hour)  MG per 24 hr tablet, Take 1 tablet by mouth Daily., Disp: 90 tablet, Rfl: 3  •  losartan (COZAAR) 50 MG tablet, Take 1 tablet by mouth Daily., Disp: 90 tablet, Rfl: 3  •  metoprolol succinate XL (TOPROL-XL) 50 MG 24 hr tablet, Take 1 tablet by mouth Daily., Disp: 90  "tablet, Rfl: 3  •  neomycin-bacitracin-polymyxin-hydrocortisone (CORTISPORIN) 1 % ophthalmic ointment, Use 1/2 in strip on lower lid to affected eye (s), Disp: 1 tube, Rfl: 0      Allergies   Allergen Reactions   • Erythromycin Base    • Gabapentin Swelling         /80 (BP Location: Left arm, Patient Position: Sitting)   Pulse 100   Temp 97.9 °F (36.6 °C) (Temporal)   Ht 157.5 cm (62\")   Wt 56.2 kg (124 lb)   SpO2 97%   BMI 22.68 kg/m²       Physical Exam:  Constitutional: Patient is oriented to person, place, and time. Patient appears well-developed and well-nourished.   HEENT: Head: Normocephalic and atraumatic. Eyes: Conjunctivae and lids are normal. Pupils: Equal, round, reactive to light.   Neck: Trachea normal. Neck supple. No JVD present.   Pulmonary Respiratory effort: No increased work of breathing or signs of respiratory distress. Auscultation of lungs: Clear to auscultation.   Cardiovascular Auscultation of heart: Normal rate and rhythm, normal S1 and S2, no murmurs.   Peripheral vascular exam: Femoral: right 2+, left 2+. Posterior tibialis: right 2+ and left 2+. Dorsalis pedis: right 2+ and left 2+. No edema. Musculoskeletal   Gait and station: Gait evaluation demonstrated a normal gait   Lumbar spine: Passive and active range of motion are full and without pain. Extension, flexion, lateral flexion, rotation of the lumbar spine did not increase or reproduce pain. Lumbar facet joint loading maneuvers are negative.   Rodrigo test and Gaenslen's test are negative   Piriformis maneuvers are negative   Palpation of the bilateral ischial tuberosities, unrevealing   Palpation of the bilateral greater trochanter, unrevealing   Examination of the Iliotibial band: unrevealing   Hip joints: The range of motion of the hip joints is full and without pain   Neurological: Patient is alert and oriented to person, place, and time. Speech: speech is normal. Cortical function: Normal mental status.   Cranial " nerves: Cranial nerves 2-12 intact.   Reflex Scores:  Right patellar: 2+  Left patellar: 2+  Right Achilles: 2+  Left Achilles: 2+  Motor strength: 5/5  Motor Tone: normal tone.   Involuntary movements: none.   Superficial/Primitive Reflexes: primitive reflexes were absent.   Right Carcamo: absent  Left Carcamo: absent  Right ankle clonus: absent  Left ankle clonus: absent   Negative long tract signs. Straight leg raising test is negative. Femoral stretch sign is negative.   Sensation: No sensory loss. Sensory exam: intact to light touch, intact pain and temperature sensation, intact vibration sensation and normal proprioception.   Coordination: Normal finger to nose and heel to shin. Normal balance and negative Romberg's sign   Skin and subcutaneous tissue: Skin is warm and intact. No rash noted. No cyanosis.   Psychiatric: Judgment and insight: Normal. Orientation to person, place and time: Normal. Recent and remote memory: Intact. Mood and affect: Normal.     ASSESSMENT:   1. Lumbar stenosis with neurogenic claudication    2. Spondylolisthesis of lumbar region    3. DDD (degenerative disc disease), lumbar    4. History of compression fracture of vertebral column    5. Osteoporosis, unspecified osteoporosis type, unspecified pathological fracture presence        PLAN/MEDICAL DECISION MAKING: I had a lengthy conversation with Ms. Carleen Payne regarding her chronic pain condition and potential therapeutic options including risks, benefits, alternative therapies, to name a few. Patient presents with a several year history of lower back pain. Some of the pain extended into the posterior aspect of her thigh and right calf and ankle. Studies demonstrated an L4-5 spondylolisthesis, stenosis. She does have a history of osteoporosis. Patient has failed to obtain pain relief with conservative measures, as referenced above. Patient  Experienced significant pain relief from interventional pain management measures, as  referenced above. I have reviewed all available patient's medical records as well as previous therapies as referenced above.  Therefore, I have proposed the following plan:  1. Diagnostic studies: Flexion and extension X-rays of the lumbar spine  2. Pharmacological measures: Reviewed. Discussed.   A. Patient takes aspirin PRN  B. Trial with Rheumate one tablet twice daily  C. Start pyridoxine 100 mg one tablet by mouth daily  3. Interventional pain management measures: None at this time. If pain recurs, patient will be scheduled for diagnostic and therapeutic right L4-L5 and right L5-S1 transforaminal epidural steroid injections. We may repeat epidurals depending on patient's outcome, or facilitate NS follow-up with Dr Gomes.    4. Long-term rehabilitation efforts:  A. The patient does not have a history of falls. I did complete a risk assessment for falls  B. Patient will start a comprehensive physical therapy program for water therapy, therapeutic exercise, core strengthening, gait and balance training, neurodynamics, myofascial release, cupping and dry needling if pain recurs  C. Start an exercise program such as yoga, Pilates, Juan Daniel Chi and water therapy  D. Contrast therapy: Apply ice-packs for 15-20 minutes, followed by heating pads for 15-20 minutes to affected area   5. The patient has been instructed to contact my office with any questions or difficulties. The patient understands the plan and agrees to proceed accordingly.       Patient Care Team:  Priyanka Guardado DO as PCP - General (Family Medicine)  Priyanka Guardado DO as PCP - Claims Attributed  Priyanka Guardado DO as Referring Physician (Family Medicine)     No orders of the defined types were placed in this encounter.        Future Appointments   Date Time Provider Department Center   6/20/2019  1:00 PM Kendal Harrington, MARIA EUGENIA, APRN [id number] None   8/14/2019  9:00 AM [facility] D[facility] 1  [facility] DX BE [facility]   8/14/2019  9:40 AM [facility] M[facility]M 1  [facility] BR BE  MD RAJENDRA Fisher Dragon/Transcription disclaimer:  Much of this encounter note is an electronic transcription of spoken language to printed text. Electronic transcription of spoken language may permit erroneous, or at times, nonsensical words or phrases to be inadvertently transcribed. Although I have reviewed the note for such errors, some may still exist.

## 2019-06-16 PROBLEM — M81.0 OSTEOPOROSIS: Status: ACTIVE | Noted: 2019-06-16

## 2019-06-16 PROBLEM — M43.16 SPONDYLOLISTHESIS OF LUMBAR REGION: Status: ACTIVE | Noted: 2019-06-16

## 2019-06-16 PROBLEM — Z87.81 HISTORY OF COMPRESSION FRACTURE OF VERTEBRAL COLUMN: Status: ACTIVE | Noted: 2019-06-16

## 2019-06-16 PROBLEM — M48.062 LUMBAR STENOSIS WITH NEUROGENIC CLAUDICATION: Status: ACTIVE | Noted: 2019-06-16

## 2019-06-20 ENCOUNTER — OFFICE VISIT (OUTPATIENT)
Dept: PAIN MEDICINE | Facility: CLINIC | Age: 69
End: 2019-06-20

## 2019-06-20 ENCOUNTER — OFFICE VISIT (OUTPATIENT)
Dept: FAMILY MEDICINE CLINIC | Facility: CLINIC | Age: 69
End: 2019-06-20

## 2019-06-20 VITALS
DIASTOLIC BLOOD PRESSURE: 72 MMHG | WEIGHT: 123.5 LBS | RESPIRATION RATE: 16 BRPM | TEMPERATURE: 97 F | HEIGHT: 62 IN | SYSTOLIC BLOOD PRESSURE: 130 MMHG | HEART RATE: 76 BPM | OXYGEN SATURATION: 97 % | BODY MASS INDEX: 22.73 KG/M2

## 2019-06-20 VITALS
SYSTOLIC BLOOD PRESSURE: 136 MMHG | TEMPERATURE: 97.9 F | OXYGEN SATURATION: 97 % | DIASTOLIC BLOOD PRESSURE: 80 MMHG | WEIGHT: 124 LBS | BODY MASS INDEX: 22.82 KG/M2 | HEIGHT: 62 IN | HEART RATE: 100 BPM

## 2019-06-20 DIAGNOSIS — M48.062 LUMBAR STENOSIS WITH NEUROGENIC CLAUDICATION: Primary | ICD-10-CM

## 2019-06-20 DIAGNOSIS — M51.36 DDD (DEGENERATIVE DISC DISEASE), LUMBAR: ICD-10-CM

## 2019-06-20 DIAGNOSIS — Z12.11 COLON CANCER SCREENING: ICD-10-CM

## 2019-06-20 DIAGNOSIS — M43.16 SPONDYLOLISTHESIS OF LUMBAR REGION: ICD-10-CM

## 2019-06-20 DIAGNOSIS — M48.062 LUMBAR STENOSIS WITH NEUROGENIC CLAUDICATION: ICD-10-CM

## 2019-06-20 DIAGNOSIS — M81.0 OSTEOPOROSIS, UNSPECIFIED OSTEOPOROSIS TYPE, UNSPECIFIED PATHOLOGICAL FRACTURE PRESENCE: ICD-10-CM

## 2019-06-20 DIAGNOSIS — Z87.81 HISTORY OF COMPRESSION FRACTURE OF VERTEBRAL COLUMN: ICD-10-CM

## 2019-06-20 DIAGNOSIS — R10.12 ABDOMINAL PAIN, LEFT UPPER QUADRANT: Primary | ICD-10-CM

## 2019-06-20 PROBLEM — M51.369 DDD (DEGENERATIVE DISC DISEASE), LUMBAR: Status: ACTIVE | Noted: 2019-06-20

## 2019-06-20 PROCEDURE — 99213 OFFICE O/P EST LOW 20 MIN: CPT | Performed by: NURSE PRACTITIONER

## 2019-06-20 PROCEDURE — 99204 OFFICE O/P NEW MOD 45 MIN: CPT | Performed by: ANESTHESIOLOGY

## 2019-06-20 RX ORDER — ME-TETRAHYDROFOLATE/B12/HRB236 1-1-500 MG
CAPSULE ORAL
Qty: 180 CAPSULE | Refills: 1 | Status: SHIPPED | OUTPATIENT
Start: 2019-06-20 | End: 2020-07-27

## 2019-06-20 RX ORDER — MULTIVITAMIN WITH IRON
100 TABLET ORAL DAILY
Qty: 30 TABLET | Refills: 5 | Status: SHIPPED | OUTPATIENT
Start: 2019-06-20 | End: 2020-07-27

## 2019-06-28 DIAGNOSIS — Z12.11 SCREENING FOR COLON CANCER: Primary | ICD-10-CM

## 2019-07-01 ENCOUNTER — HOSPITAL ENCOUNTER (OUTPATIENT)
Dept: ULTRASOUND IMAGING | Facility: HOSPITAL | Age: 69
Discharge: HOME | End: 2019-07-01
Admitting: NURSE PRACTITIONER

## 2019-07-01 DIAGNOSIS — R10.12 ABDOMINAL PAIN, LEFT UPPER QUADRANT: ICD-10-CM

## 2019-07-01 PROCEDURE — 76700 US EXAM ABDOM COMPLETE: CPT

## 2019-07-08 ENCOUNTER — TELEPHONE (OUTPATIENT)
Dept: FAMILY MEDICINE CLINIC | Facility: CLINIC | Age: 69
End: 2019-07-08

## 2019-07-08 ENCOUNTER — OUTSIDE FACILITY SERVICE (OUTPATIENT)
Dept: GASTROENTEROLOGY | Facility: CLINIC | Age: 69
End: 2019-07-08

## 2019-07-08 DIAGNOSIS — R10.12 ABDOMINAL PAIN, LEFT UPPER QUADRANT: Primary | ICD-10-CM

## 2019-07-08 PROCEDURE — G0121 COLON CA SCRN NOT HI RSK IND: HCPCS | Performed by: INTERNAL MEDICINE

## 2019-07-08 NOTE — TELEPHONE ENCOUNTER
Called to review US of abd. She continues to have LUQ pain and swelling. Awaiting colonoscopy results. Will go ahead and order ct of abd/pelvis

## 2019-07-23 ENCOUNTER — HOSPITAL ENCOUNTER (OUTPATIENT)
Dept: CT IMAGING | Facility: HOSPITAL | Age: 69
Discharge: HOME | End: 2019-07-23
Admitting: NURSE PRACTITIONER

## 2019-07-23 DIAGNOSIS — R10.12 ABDOMINAL PAIN, LEFT UPPER QUADRANT: ICD-10-CM

## 2019-07-23 PROCEDURE — 74176 CT ABD & PELVIS W/O CONTRAST: CPT

## 2019-07-23 RX ADMIN — BARIUM SULFATE 450 ML: 21 SUSPENSION ORAL at 10:15

## 2019-07-25 ENCOUNTER — TELEPHONE (OUTPATIENT)
Dept: FAMILY MEDICINE CLINIC | Facility: CLINIC | Age: 69
End: 2019-07-25

## 2019-07-25 DIAGNOSIS — R10.12 ABDOMINAL PAIN, LEFT UPPER QUADRANT: Primary | ICD-10-CM

## 2019-07-25 NOTE — TELEPHONE ENCOUNTER
Called patient to discuss CT.  She is still having pain and can now tell that it is after eating. She had  Colonoscopy that showed diverticulosis. Will refer to GI.

## 2019-08-14 ENCOUNTER — HOSPITAL ENCOUNTER (OUTPATIENT)
Dept: MAMMOGRAPHY | Facility: HOSPITAL | Age: 69
Discharge: HOME | End: 2019-08-14

## 2019-08-14 ENCOUNTER — HOSPITAL ENCOUNTER (OUTPATIENT)
Dept: BONE DENSITY | Facility: HOSPITAL | Age: 69
Discharge: HOME | End: 2019-08-14
Admitting: FAMILY MEDICINE

## 2019-08-14 DIAGNOSIS — Z12.31 VISIT FOR SCREENING MAMMOGRAM: ICD-10-CM

## 2019-08-14 DIAGNOSIS — Z78.0 POSTMENOPAUSAL: ICD-10-CM

## 2019-08-14 PROCEDURE — 77063 BREAST TOMOSYNTHESIS BI: CPT

## 2019-08-14 PROCEDURE — 77063 BREAST TOMOSYNTHESIS BI: CPT | Performed by: RADIOLOGY

## 2019-08-14 PROCEDURE — 77067 SCR MAMMO BI INCL CAD: CPT | Performed by: RADIOLOGY

## 2019-08-14 PROCEDURE — 77080 DXA BONE DENSITY AXIAL: CPT

## 2019-08-14 PROCEDURE — 77067 SCR MAMMO BI INCL CAD: CPT

## 2019-08-15 ENCOUNTER — TELEPHONE (OUTPATIENT)
Dept: URGENT CARE | Facility: CLINIC | Age: 69
End: 2019-08-15

## 2019-09-20 ENCOUNTER — OFFICE VISIT (OUTPATIENT)
Dept: GASTROENTEROLOGY | Facility: CLINIC | Age: 69
End: 2019-09-20

## 2019-09-20 VITALS
DIASTOLIC BLOOD PRESSURE: 78 MMHG | HEIGHT: 62 IN | RESPIRATION RATE: 18 BRPM | TEMPERATURE: 97.3 F | OXYGEN SATURATION: 98 % | HEART RATE: 86 BPM | WEIGHT: 124 LBS | SYSTOLIC BLOOD PRESSURE: 124 MMHG | BODY MASS INDEX: 22.82 KG/M2

## 2019-09-20 DIAGNOSIS — G89.29 CHRONIC ABDOMINAL PAIN: Primary | ICD-10-CM

## 2019-09-20 DIAGNOSIS — R10.9 CHRONIC ABDOMINAL PAIN: Primary | ICD-10-CM

## 2019-09-20 PROCEDURE — 99214 OFFICE O/P EST MOD 30 MIN: CPT | Performed by: INTERNAL MEDICINE

## 2019-09-20 NOTE — PROGRESS NOTES
"PCP: Priyanka Guardado DO    Chief Complaint   Patient presents with   • Abdominal Pain     LUQ PAIN        History of Present Illness:   Carleen Payne is a 69 y.o. female who presents to the GI clinic for luq discomfort. The discomfort is located around her left rib cage. It does worsen with eating. The discomfort is almost daily but not bothersome. Characterized as a \"swelling\". Discomfort does not radiate. She would like reassurance.  She has not lost weight or experienced vomiting. She does occasionally experience early satiety.  She has 1 bm q2 weeks which is not bothersome to her. Denies bloat, nausea. She had a colonoscopy with a good prep 2019.    Past Medical History:   Diagnosis Date   • Hyperlipidemia    • Osteoporosis        Past Surgical History:   Procedure Laterality Date   • NO PAST SURGERIES           Current Outpatient Medications:   •  atorvastatin (LIPITOR) 10 MG tablet, Take 1 tablet by mouth Daily., Disp: 90 tablet, Rfl: 3  •  Calcium 150 MG tablet, Take  by mouth., Disp: , Rfl:   •  Cholecalciferol (VITAMIN D) 1000 UNITS tablet, Take  by mouth., Disp: , Rfl:   •  cyclopentolate (CYCLOGYL) 1 % ophthalmic solution, INSTILL 1 DROP UPON AWAKENING, 1 DROP WHEN LEAVING HOME, AND 1 DROP UPON ARRIVAL, Disp: , Rfl: 0  •  Dietary Management Product (RHEUMATE) capsule, Take 1 capsule twice daily, Disp: 180 capsule, Rfl: 1  •  loratadine-pseudoephedrine (CLARITIN-D 24-hour)  MG per 24 hr tablet, Take 1 tablet by mouth Daily., Disp: 90 tablet, Rfl: 3  •  losartan (COZAAR) 50 MG tablet, Take 1 tablet by mouth Daily., Disp: 90 tablet, Rfl: 3  •  metoprolol succinate XL (TOPROL-XL) 50 MG 24 hr tablet, Take 1 tablet by mouth Daily., Disp: 90 tablet, Rfl: 3  •  ondansetron ODT (ZOFRAN-ODT) 8 MG disintegrating tablet, Take 1 tablet by mouth Every 8 (Eight) Hours As Needed for Nausea., Disp: 12 tablet, Rfl: 0  •  predniSONE (DELTASONE) 10 MG (21) tablet pack, Take  by mouth Daily. Use as directed on package, " Disp: 21 tablet, Rfl: 0  •  Sod Picosulfate-Mag Ox-Cit Acd 10-3.5-12 MG-GM -GM/160ML solution, Take 1 kit by mouth Take As Directed. Follow instructions that were mailed to your home. If you didn't receive these call [phone]., Disp: 2 bottle, Rfl: 0  •  vitamin B-6 (PYRIDOXINE) 100 MG tablet, Take 1 tablet by mouth Daily., Disp: 30 tablet, Rfl: 5    Allergies   Allergen Reactions   • Erythromycin Base    • Gabapentin Swelling       Family History   Problem Relation Age of Onset   • Hypertension Mother    • Hypertension Father    • Stroke Father    • Breast cancer Neg Hx        Social History     Socioeconomic History   • Marital status:      Spouse name: Not on file   • Number of children: Not on file   • Years of education: Not on file   • Highest education level: Not on file   Tobacco Use   • Smoking status: Never Smoker   • Smokeless tobacco: Never Used   Substance and Sexual Activity   • Alcohol use: No   • Drug use: No   • Sexual activity: Yes     Partners: Male       Review of Systems   Constitutional: Negative.    HENT: Negative.    Eyes: Negative.    Respiratory: Negative.    Cardiovascular: Negative.    Gastrointestinal: Positive for abdominal pain and constipation.   Endocrine: Negative.    Genitourinary: Negative.    Musculoskeletal: Negative.    Allergic/Immunologic: Negative.    Neurological: Negative.    Hematological: Negative.    Psychiatric/Behavioral: Negative.      All other systems reviewed and are negative.    There were no vitals filed for this visit.    Physical Exam  General Appearance:  Vitals as above. no acute distress  Head/face:  Normocephalic, atraumatic  Eyes:   EOMI, no conjunctivitis or icterus   Nose/Sinuses:  Nares patent bilaterally without discharge or lesions  Mouth/Throat:  Posterior pharynx is pink without drainage or exudates;  dentition is in good condition and repair  Neck:  trachea is midline, no thyromegaly  Lungs:  Clear to auscultation bilaterally  Heart:   "Regular rate and rhythm without murmur, gallop or rub  Abdomen:  Soft, non-tender to palpation, no obvious masses, bowel sounds positive in four quadrants; no abdominal bruits; no guarding or rebound tenderness  Neurologic:  Alert; no focal deficits; age appropriate behavior and speech  Psychiatric: mood and affect are congruent  Vascular: extremities without edema  Skin: no rash or cyanosis.      Assessment/Plan  1.) Chronic luq discomfort  Her discomfort is located just underneath her left rib cage and I can not really palpate a lump today. She thinks the discomfort may be a \"stool ball\" which would not be consistent in my opinion though she clearly has nonbothersome constipation.  Differential would include chest wall abnormality.     Workup: u/s abdomen, ct a/p    I provided historical reassurance but that I would offer endoscopy to back up claim. She is aware of risk of missed lesion if she chooses not to proceed with egd.      2.) constipation  Not bothersome to her but I would recommend more frequent bms than q 2 weeks. Recommended otc miralax. Educated how to take.    3.) Early satiety  Discussed my recommendation for egd as above.    She can rtc prn    Stephen Crockett MD  9/20/2019  "

## 2020-02-06 ENCOUNTER — HOSPITAL ENCOUNTER (OUTPATIENT)
Dept: GENERAL RADIOLOGY | Facility: HOSPITAL | Age: 70
Discharge: HOME | End: 2020-02-06
Admitting: ANESTHESIOLOGY

## 2020-02-06 PROCEDURE — 72120 X-RAY BEND ONLY L-S SPINE: CPT

## 2020-02-18 NOTE — PROGRESS NOTES
"Chief Complaint: \"Pain in my lower back, in my tailbone.\"       History of Present Illness:   Patient: Ms. Carleen Payne, 69 y.o. female originally referred by Dr. Priyanka Guardado in consultation for intractable chronic lower back pain. Patient reports a 16-year history of lower back pain, which began after a fall. Patient used to see Dr Tan Talbert, and received 6 epidurals with significant relief. Her last epidural was in 09/2018.  At the time of her initial evaluation with Dr. Philippe on June 28, 2019, she was still experiencing significant ongoing pain relief from previous epidural performed by Dr. Talbert in September of 2018.  She reports her pain began recurring about one month ago. She returns today for reevaluation of her lower back pain, and to pursue further interventional measures recommended at her initial office visit.  She reports no significant changes in her medical history since she was last seen.  Pain description: constant pain with intermittent exacerbation, described as aching and burning sensation.   Radiation of pain: The lower back \"tailbone\" pain radiates into the posterior aspect of the right thigh, right calf to the ankle   Pain intensity today: 6/10  Average pain intensity last week: 6/10  Pain intensity ranges from: 4/10 to 8/10  Aggravating factors: Pain increases with lying down, sitting longer than 5 minutes, and immediately upon standing.   Alleviating factors: Pain decreases with analgesics    Associated symptoms:   Patient denies numbness or weakness in the lower extremities  Patient denies any new bladder or bowel problems.   Patient denies difficulties with her balance or recent falls  Pain interferes with her sleep      Review of previous therapies and additional medical records:  Carleen Payne has already failed the following measures, including:   Conservative measures: oral analgesics and physical therapy   Interventional measures: former patient of Dr. Talbert. She " underwent pidural injections (RT L4-L5 transforaminals x4) with significant relief  Surgical measures: No history of lumbar spine surgery  Carleen Payne underwent neurosurgical consultation with Dr. Gomes on 06/16/2017, and was found not to be a surgical candidate.  Carleen Payne is a healthy adult other than her chronic pain and HTN.  In terms of current analgesics, Carleen Payne takes: aspirin PRN  I have reviewed Anatoly Report #[id number] consistent to medication reconciliation.     Global Pain Scale 06-20 2019 02-19 2020               Pain  1  12               Feelings  0  0               Clinical outcomes  0  0               Activities  0  8               GPS Total:  3  20                  Review of New Diagnostic Studies:  XR SPINE LUMBAR FLEX AND EXT- FINDINGS: There is again late grade 1 or early grade 2 anterior subluxation of L4 on L5 similar to the prior study. No more than 2 mm of anterior subluxation is seen from extension to flexion. No significant movement is seen at the remaining levels. Late grade 1 or early grade 2 anterior subluxation of L4 on L5, with minimal change in subluxation from flexion to extension. No  other significant lumbar disease is seen elsewhere.     Review of Diagnostic Studies:    XR SPINE LUMBAR FLEX AND EXT- 01/25/2017: There is considerable anterolisthesis of L4 forward from L5 with reduction of L4-L5 disc space and there is trace anterolisthesis of L5 forward from S1. Flexion and extension images reveal no evidence of instability   MRI of the lumbar spine without contrast on November 2, 2016: Radiology report. Mild disc desiccation at all levels. There are sagittal annular fissures at the lower 4 lumbar levels.  Axial imaging:  T12-L1: Small posterior left paracentral disc protrusion. Mild effacement of the ventral leftward thecal sac without significant spinal canal or NF stenosis   L1-L2: negative  L2-L3 and L3-L4: Mild lateral disc bulging.  No canal or NF  stenosis  L4-L5: Moderate facet hypertrophy associated with bilateral joint effusions and a grade 1 anterolisthesis of L4 on L5. Mild circumferential disc bulge with mild superior pseudo-protrusion of disc material.  Mild narrowing of the neuroforamina at the thecal sac. There is significant narrowing of the superior right subarticular zone with effacement of the right L5 nerve root.  There is mild to moderate narrowing of the superior left subarticular zone with mild effacement of the left L5 nerve root sheath.  L5-S1: Mild disc space narrowing and mild facet hypertrophy. Mild right and mild to moderate left subarticular zone narrowing just above the level of the pedicles.  Mild effacement of the left S1 nerve root.  There may be slight effacement of the right S1 nerve root.  No narrowing of the thecal sac.  Mild neuroforaminal stenosis    Review of Systems   Musculoskeletal: Positive for back pain.   All other systems reviewed and are negative.        Patient Active Problem List   Diagnosis   • Lumbar radiculopathy   • Right sided sciatica   • Leg pain   • Lumbar stenosis with neurogenic claudication   • History of compression fracture of vertebral column   • Osteoporosis   • Spondylolisthesis of lumbar region   • DDD (degenerative disc disease), lumbar       Past Medical History:   Diagnosis Date   • Hyperlipidemia    • Hypertension    • Osteoporosis          Past Surgical History:   Procedure Laterality Date   • NO PAST SURGERIES           Family History   Problem Relation Age of Onset   • Hypertension Mother    • Hypertension Father    • Stroke Father    • Breast cancer Neg Hx          Social History     Socioeconomic History   • Marital status:      Spouse name: Not on file   • Number of children: Not on file   • Years of education: Not on file   • Highest education level: Not on file   Tobacco Use   • Smoking status: Never Smoker   • Smokeless tobacco: Never Used   Substance and Sexual Activity   •  "Alcohol use: No   • Drug use: No   • Sexual activity: Yes     Partners: Male           Current Outpatient Medications:   •  atorvastatin (LIPITOR) 10 MG tablet, Take 1 tablet by mouth Daily., Disp: 90 tablet, Rfl: 3  •  Calcium 150 MG tablet, Take 150 mg by mouth Daily., Disp: , Rfl:   •  Cholecalciferol (VITAMIN D) 1000 UNITS tablet, Take 1,000 Units by mouth Daily., Disp: , Rfl:   •  loratadine-pseudoephedrine (CLARITIN-D 24-hour)  MG per 24 hr tablet, Take 1 tablet by mouth Daily., Disp: 90 tablet, Rfl: 3  •  losartan (COZAAR) 50 MG tablet, Take 1 tablet by mouth Daily., Disp: 90 tablet, Rfl: 3  •  metoprolol succinate XL (TOPROL-XL) 50 MG 24 hr tablet, Take 1 tablet by mouth Daily., Disp: 90 tablet, Rfl: 3  •  vitamin B-6 (PYRIDOXINE) 100 MG tablet, Take 1 tablet by mouth Daily., Disp: 30 tablet, Rfl: 5  •  Dietary Management Product (RHEUMATE) capsule, Take 1 capsule twice daily, Disp: 180 capsule, Rfl: 1      Allergies   Allergen Reactions   • Erythromycin Base Nausea And Vomiting   • Gabapentin Swelling     Body swelling         Ht 157.5 cm (62\")   Wt 56.5 kg (124 lb 9.6 oz)   BMI 22.79 kg/m²       Physical Exam:  Constitutional: Patient is oriented to person, place, and time. Patient appears well-developed and well-nourished.   HEENT: Head: Normocephalic and atraumatic. Eyes: Conjunctivae and lids are normal. Pupils: Equal, round, reactive to light.   Neck: Trachea normal. Neck supple. No JVD present.   Pulmonary Respiratory effort: No increased work of breathing or signs of respiratory distress. Auscultation of lungs: Clear to auscultation.   Cardiovascular Auscultation of heart: Normal rate and rhythm, normal S1 and S2, no murmurs.   Peripheral vascular exam: Femoral: right 2+, left 2+. Posterior tibialis: right 2+ and left 2+. Dorsalis pedis: right 2+ and left 2+. No edema. Musculoskeletal   Gait and station: Gait evaluation demonstrated a normal gait   Lumbar spine: Passive and active range of " motion are full and without pain. Extension, flexion, lateral flexion, rotation of the lumbar spine did not increase or reproduce pain. Lumbar facet joint loading maneuvers are negative.   Rodrigo test and Gaenslen's test are negative   Piriformis maneuvers are negative   Palpation of the bilateral ischial tuberosities, unrevealing   Palpation of the bilateral greater trochanter, unrevealing   Examination of the Iliotibial band: unrevealing   Hip joints: The range of motion of the hip joints is full and without pain   Neurological: Patient is alert and oriented to person, place, and time. Speech: speech is normal. Cortical function: Normal mental status.   Cranial nerves: Cranial nerves 2-12 intact.   Reflex Scores:  Right patellar: 2+  Left patellar: 2+  Right Achilles: 2+  Left Achilles: 2+  Motor strength: 5/5  Motor Tone: normal tone.   Involuntary movements: none.   Superficial/Primitive Reflexes: primitive reflexes were absent.   Right Carcamo: absent  Left Carcamo: absent  Right ankle clonus: absent  Left ankle clonus: absent   Negative long tract signs. Straight leg raising test is negative. Femoral stretch sign is negative.   Sensation: No sensory loss. Sensory exam: intact to light touch, intact pain and temperature sensation, intact vibration sensation and normal proprioception.   Coordination: Normal finger to nose and heel to shin. Normal balance and negative Romberg's sign   Skin and subcutaneous tissue: Skin is warm and intact. No rash noted. No cyanosis.   Psychiatric: Judgment and insight: Normal. Orientation to person, place and time: Normal. Recent and remote memory: Intact. Mood and affect: Normal.     ASSESSMENT:   1. Lumbar stenosis with neurogenic claudication    2. Spondylolisthesis of lumbar region    3. DDD (degenerative disc disease), lumbar    4. History of compression fracture of vertebral column        PLAN/MEDICAL DECISION MAKING: I had a lengthy conversation with Ms. Carleen Payne  regarding her chronic pain condition and potential therapeutic options including risks, benefits, alternative therapies, to name a few. Patient presents with a several year history of lower back pain. Some of the pain extended into the posterior aspect of her thigh and right calf and ankle. Studies demonstrated an L4-5 spondylolisthesis, stenosis. She does have a history of osteoporosis. Patient has failed to obtain pain relief with conservative measures, as referenced above. Patient experienced significant pain relief from interventional pain management measures, as referenced above. I have reviewed all available patient's medical records as well as previous therapies as referenced above, and discussed the patient with Dr. Philippe.  Therefore, I have proposed the following plan:  1.Pharmacological measures: Reviewed. Discussed.   A. Patient takes aspirin PRN  B. Continue with Rheumate one tablet twice daily  C. Continue  pyridoxine 100 mg one tablet by mouth daily  2. Interventional pain management measures: Patient will be scheduled for diagnostic and therapeutic right L4-L5 and right L5-S1 transforaminal epidural steroid injections. We may repeat epidurals depending on patient's outcome, or facilitate NS follow-up with Dr Gomes.    3. Long-term rehabilitation efforts:  A. The patient does not have a history of falls. I did complete a risk assessment for falls  B. Patient will start a comprehensive physical therapy program for water therapy, therapeutic exercise, core strengthening, gait and balance training, neurodynamics, myofascial release, cupping and dry needling if pain recurs  C. Start an exercise program such as yoga, Pilates, Juan Daniel Chi and water therapy  D. Contrast therapy: Apply ice-packs for 15-20 minutes, followed by heating pads for 15-20 minutes to affected area   4. The patient has been instructed to contact my office with any questions or difficulties. The patient understands the plan and agrees  to proceed accordingly.       Patient Care Team:  Priyanka Guardado DO as PCP - General (Family Medicine)  Priyanka Guardado DO as PCP - Claims Attributed  Priyanka Guardado DO as Referring Physician (Family Medicine)     No orders of the defined types were placed in this encounter.        No future appointments.      RAYMOND Lorenzana Dragon/Transcription disclaimer:  Much of this encounter note is an electronic transcription of spoken language to printed text. Electronic transcription of spoken language may permit erroneous, or at times, nonsensical words or phrases to be inadvertently transcribed. Although I have reviewed the note for such errors, some may still exist.

## 2020-02-19 ENCOUNTER — OFFICE VISIT (OUTPATIENT)
Dept: PAIN MEDICINE | Facility: CLINIC | Age: 70
End: 2020-02-19

## 2020-02-19 VITALS — BODY MASS INDEX: 22.93 KG/M2 | WEIGHT: 124.6 LBS | HEIGHT: 62 IN

## 2020-02-19 DIAGNOSIS — M43.16 SPONDYLOLISTHESIS OF LUMBAR REGION: ICD-10-CM

## 2020-02-19 DIAGNOSIS — M48.062 LUMBAR STENOSIS WITH NEUROGENIC CLAUDICATION: ICD-10-CM

## 2020-02-19 DIAGNOSIS — Z87.81 HISTORY OF COMPRESSION FRACTURE OF VERTEBRAL COLUMN: ICD-10-CM

## 2020-02-19 DIAGNOSIS — M48.062 LUMBAR STENOSIS WITH NEUROGENIC CLAUDICATION: Primary | ICD-10-CM

## 2020-02-19 DIAGNOSIS — M51.36 DDD (DEGENERATIVE DISC DISEASE), LUMBAR: ICD-10-CM

## 2020-02-19 PROCEDURE — 99214 OFFICE O/P EST MOD 30 MIN: CPT | Performed by: NURSE PRACTITIONER

## 2020-02-24 DIAGNOSIS — M51.36 DDD (DEGENERATIVE DISC DISEASE), LUMBAR: ICD-10-CM

## 2020-02-24 DIAGNOSIS — M43.16 SPONDYLOLISTHESIS OF LUMBAR REGION: ICD-10-CM

## 2020-02-24 DIAGNOSIS — Z87.81 HISTORY OF COMPRESSION FRACTURE OF VERTEBRAL COLUMN: ICD-10-CM

## 2020-02-24 DIAGNOSIS — M48.062 LUMBAR STENOSIS WITH NEUROGENIC CLAUDICATION: Primary | ICD-10-CM

## 2020-02-26 ENCOUNTER — OUTSIDE FACILITY SERVICE (OUTPATIENT)
Dept: PAIN MEDICINE | Facility: CLINIC | Age: 70
End: 2020-02-26

## 2020-02-26 PROCEDURE — 64484 NJX AA&/STRD TFRM EPI L/S EA: CPT | Performed by: ANESTHESIOLOGY

## 2020-02-26 PROCEDURE — 99152 MOD SED SAME PHYS/QHP 5/>YRS: CPT | Performed by: ANESTHESIOLOGY

## 2020-02-26 PROCEDURE — 64483 NJX AA&/STRD TFRM EPI L/S 1: CPT | Performed by: ANESTHESIOLOGY

## 2020-02-27 ENCOUNTER — TELEPHONE (OUTPATIENT)
Dept: PAIN MEDICINE | Facility: CLINIC | Age: 70
End: 2020-02-27

## 2020-02-27 NOTE — TELEPHONE ENCOUNTER
DX/THERA RT L4-L5 & RT L5-S1 TRANS DIANA 02/26/2020.    Pt reports she is doing very well. She is very satisfied. No questions/concerns.

## 2020-03-19 NOTE — PROGRESS NOTES
"Chief Complaint: \"I am doing well after my injection, therapy is helping.\"       History of Present Illness:   Patient: Ms. Carleen Payne, 69 y.o. female originally referred by Dr. Priyanka Guardado in consultation for intractable chronic lower back pain. Patient reports a 16-year history of lower back pain, which began after a fall. Patient was last seen on February 26, 2020, when she underwent diagnostic and therapeutic right L4-L5 and right L5-S1, from which she reports experiencing 50% pain relief and functional improvement lasting that is ongoing. She did participate in physical therapy as medically advised, and reports at times her pain ceases after physical therapy.  She returns today for postprocedure follow up and evaluation.   Pain description: constant pain with intermittent exacerbation, described as aching and burning sensation.   Radiation of pain: The lower back \"tailbone\" pain radiates into the posterior aspect of the right thigh, right calf to the ankle.  Today she reports this radiation of pain intermittently as her injection.  Pain intensity today: 3/10  Average pain intensity last week: 3/10  Pain intensity ranges from: 3/10 to 5/10  Aggravating factors: Pain increases with lying down, sitting longer than 5 minute.  Today she reports standing is significantly better and does not increase her pain to her injection.  Alleviating factors: Pain decreases with analgesics    Associated symptoms:   Patient denies numbness or weakness in the lower extremities  Patient denies any new bladder or bowel problems.   Patient denies difficulties with her balance or recent falls  Pain interferes with her sleep      Review of previous therapies and additional medical records:  Carleen Payne has already failed the following measures, including:   Conservative measures: oral analgesics and physical therapy   Interventional measures: 02/26/2020: DxTx right L4-L5 and right L5-S1 TESI   former patient of Dr. Talbert. She " underwent pidural injections (RT L4-L5 transforaminals x4) with significant relief  Surgical measures: No history of lumbar spine surgery  Carleen Payne underwent neurosurgical consultation with Dr. Gomes on 06/16/2017, and was found not to be a surgical candidate.  Carleen Payne is a healthy adult other than her chronic pain and HTN.  In terms of current analgesics, Carleen Payne takes: aspirin PRN  I have reviewed Anatoly Report #[id number] consistent to medication reconciliation.     Global Pain Scale 06-20 2019 02-19 2020 03-23 2020             Pain  1  12  10             Feelings  0  0  0             Clinical outcomes  0  0  2             Activities  0  8  0             GPS Total:  3  20  12                Review of Diagnostic Studies:  XR SPINE LUMBAR FLEX AND EXT- FINDINGS: There is again late grade 1 or early grade 2 anterior subluxation of L4 on L5 similar to the prior study. No more than 2 mm of anterior subluxation is seen from extension to flexion. No significant movement is seen at the remaining levels. Late grade 1 or early grade 2 anterior subluxation of L4 on L5, with minimal change in subluxation from flexion to extension. No  other significant lumbar disease is seen elsewhere.  XR SPINE LUMBAR FLEX AND EXT- 01/25/2017: There is considerable anterolisthesis of L4 forward from L5 with reduction of L4-L5 disc space and there is trace anterolisthesis of L5 forward from S1. Flexion and extension images reveal no evidence of instability   MRI of the lumbar spine without contrast on November 2, 2016: Radiology report. Mild disc desiccation at all levels. There are sagittal annular fissures at the lower 4 lumbar levels.  Axial imaging:  T12-L1: Small posterior left paracentral disc protrusion. Mild effacement of the ventral leftward thecal sac without significant spinal canal or NF stenosis   L1-L2: negative  L2-L3 and L3-L4: Mild lateral disc bulging.  No canal or NF stenosis  L4-L5: Moderate facet  hypertrophy associated with bilateral joint effusions and a grade 1 anterolisthesis of L4 on L5. Mild circumferential disc bulge with mild superior pseudo-protrusion of disc material.  Mild narrowing of the neuroforamina at the thecal sac. There is significant narrowing of the superior right subarticular zone with effacement of the right L5 nerve root.  There is mild to moderate narrowing of the superior left subarticular zone with mild effacement of the left L5 nerve root sheath.  L5-S1: Mild disc space narrowing and mild facet hypertrophy. Mild right and mild to moderate left subarticular zone narrowing just above the level of the pedicles.  Mild effacement of the left S1 nerve root.  There may be slight effacement of the right S1 nerve root.  No narrowing of the thecal sac.  Mild neuroforaminal stenosis    Review of Systems   Allergic/Immunologic: Positive for environmental allergies.   All other systems reviewed and are negative.        Patient Active Problem List   Diagnosis   • Lumbar radiculopathy   • Right sided sciatica   • Leg pain   • Lumbar stenosis with neurogenic claudication   • History of compression fracture of vertebral column   • Osteoporosis   • Spondylolisthesis of lumbar region   • DDD (degenerative disc disease), lumbar       Past Medical History:   Diagnosis Date   • Hyperlipidemia    • Hypertension    • Osteoporosis          Past Surgical History:   Procedure Laterality Date   • NO PAST SURGERIES           Family History   Problem Relation Age of Onset   • Hypertension Mother    • Hypertension Father    • Stroke Father    • Breast cancer Neg Hx          Social History     Socioeconomic History   • Marital status:      Spouse name: Not on file   • Number of children: Not on file   • Years of education: Not on file   • Highest education level: Not on file   Tobacco Use   • Smoking status: Never Smoker   • Smokeless tobacco: Never Used   Substance and Sexual Activity   • Alcohol use: No  "  • Drug use: No   • Sexual activity: Yes     Partners: Male           Current Outpatient Medications:   •  atorvastatin (LIPITOR) 10 MG tablet, Take 1 tablet by mouth Daily., Disp: 90 tablet, Rfl: 3  •  Calcium 150 MG tablet, Take 150 mg by mouth Daily., Disp: , Rfl:   •  Cholecalciferol (VITAMIN D) 1000 UNITS tablet, Take 1,000 Units by mouth Daily., Disp: , Rfl:   •  Dietary Management Product (RHEUMATE) capsule, Take 1 capsule twice daily, Disp: 180 capsule, Rfl: 1  •  loratadine-pseudoephedrine (CLARITIN-D 24-hour)  MG per 24 hr tablet, Take 1 tablet by mouth Daily., Disp: 90 tablet, Rfl: 3  •  losartan (COZAAR) 50 MG tablet, Take 1 tablet by mouth Daily., Disp: 90 tablet, Rfl: 3  •  metoprolol succinate XL (TOPROL-XL) 50 MG 24 hr tablet, Take 1 tablet by mouth Daily., Disp: 90 tablet, Rfl: 3  •  vitamin B-6 (PYRIDOXINE) 100 MG tablet, Take 1 tablet by mouth Daily., Disp: 30 tablet, Rfl: 5      Allergies   Allergen Reactions   • Erythromycin Base Nausea And Vomiting   • Gabapentin Swelling     Body swelling         /78   Temp 97.8 °F (36.6 °C)   Ht 157.5 cm (62\")   Wt 58.1 kg (128 lb)   BMI 23.41 kg/m²       Physical Exam:  Constitutional: Patient is oriented to person, place, and time. Patient appears well-developed and well-nourished.   HEENT: Head: Normocephalic and atraumatic. Eyes: Conjunctivae and lids are normal. Pupils: Equal, round, reactive to light.   Neck: Trachea normal. Neck supple. No JVD present.   Pulmonary Respiratory effort: No increased work of breathing or signs of respiratory distress. Auscultation of lungs: Clear to auscultation.   Cardiovascular Auscultation of heart: Normal rate and rhythm, normal S1 and S2, no murmurs.   Peripheral vascular exam: Femoral: right 2+, left 2+. Posterior tibialis: right 2+ and left 2+. Dorsalis pedis: right 2+ and left 2+. No edema. Musculoskeletal   Gait and station: Gait evaluation demonstrated a normal gait   Lumbar spine: Passive and " active range of motion are full and without pain. Extension, flexion, lateral flexion, rotation of the lumbar spine did not increase or reproduce pain. Lumbar facet joint loading maneuvers are negative.   Rodrigo test and Gaenslen's test are negative   Piriformis maneuvers are negative   Palpation of the bilateral ischial tuberosities, unrevealing   Palpation of the bilateral greater trochanter, unrevealing   Examination of the Iliotibial band: unrevealing   Hip joints: The range of motion of the hip joints is full and without pain   Neurological: Patient is alert and oriented to person, place, and time. Speech: speech is normal. Cortical function: Normal mental status.   Cranial nerves: Cranial nerves 2-12 intact.   Reflex Scores:  Right patellar: 2+  Left patellar: 2+  Right Achilles: 2+  Left Achilles: 2+  Motor strength: 5/5  Motor Tone: normal tone.   Involuntary movements: none.   Superficial/Primitive Reflexes: primitive reflexes were absent.   Right Carcamo: absent  Left Carcamo: absent  Right ankle clonus: absent  Left ankle clonus: absent   Negative long tract signs. Straight leg raising test is negative. Femoral stretch sign is negative.   Sensation: No sensory loss. Sensory exam: intact to light touch, intact pain and temperature sensation, intact vibration sensation and normal proprioception.   Coordination: Normal finger to nose and heel to shin. Normal balance and negative Romberg's sign   Skin and subcutaneous tissue: Skin is warm and intact. No rash noted. No cyanosis.   Psychiatric: Judgment and insight: Normal. Orientation to person, place and time: Normal. Recent and remote memory: Intact. Mood and affect: Normal.     ASSESSMENT:   1. Lumbar stenosis with neurogenic claudication    2. Spondylolisthesis of lumbar region    3. DDD (degenerative disc disease), lumbar    4. History of compression fracture of vertebral column    5. Osteoporosis, unspecified osteoporosis type, unspecified pathological  fracture presence        PLAN/MEDICAL DECISION MAKING: I had a lengthy conversation with Ms. Carleen Payne regarding her chronic pain condition and potential therapeutic options including risks, benefits, alternative therapies, to name a few. Patient presents with a several year history of lower back pain. Some of the pain extends into the posterior aspect of her thigh and right calf and ankle. Studies demonstrated an L4-5 spondylolisthesis, stenosis. She does have a history of osteoporosis. Patient has failed to obtain pain relief with conservative measures, as referenced above. Patient experienced significant pain relief from interventional pain management measures, as referenced above. I have reviewed all available patient's medical records as well as previous therapies as referenced above, and discussed the patient with Dr. Philippe.  Therefore, I have proposed the following plan:  1.Pharmacological measures: Reviewed. Discussed.   A. Patient takes aspirin PRN  B. Continue with Rheumate one tablet twice daily  C. Continue  pyridoxine 100 mg one tablet by mouth daily  2. Interventional pain management measures: Patient will be scheduled for  right L4-L5 and right L5-S1 transforaminal epidural steroid injections. We may repeat epidurals depending on patient's outcome, or facilitate NS follow-up with Dr Gomes.    3. Long-term rehabilitation efforts:  A. The patient does not have a history of falls. I did complete a risk assessment for falls  B. Patient will continue a comprehensive physical therapy program for water therapy, therapeutic exercise, core strengthening, gait and balance training, neurodynamics, myofascial release, cupping and dry needling if pain recurs  C. Start an exercise program such as yoga, Pilates, Juan Daniel Chi and water therapy  D. Contrast therapy: Apply ice-packs for 15-20 minutes, followed by heating pads for 15-20 minutes to affected area   4. The patient has been instructed to contact my  office with any questions or difficulties. The patient understands the plan and agrees to proceed accordingly.       Patient Care Team:  Priyanka Guardado DO as PCP - General (Family Medicine)  Priyanka Guardado DO as PCP - Claims Attributed  Priyanka Guardado DO as Referring Physician (Family Medicine)     No orders of the defined types were placed in this encounter.        Future Appointments   Date Time Provider Department Center   6/2/2020  8:15 AM Priyanka Guardado DO [facility]  [facility] None         [facility] Lorenzana     EMR Dragon/Transcription disclaimer:  Much of this encounter note is an electronic transcription of spoken language to printed text. Electronic transcription of spoken language may permit erroneous, or at times, nonsensical words or phrases to be inadvertently transcribed. Although I have reviewed the note for such errors, some may still exist.

## 2020-03-23 ENCOUNTER — OFFICE VISIT (OUTPATIENT)
Dept: PAIN MEDICINE | Facility: CLINIC | Age: 70
End: 2020-03-23

## 2020-03-23 VITALS
TEMPERATURE: 97.8 F | HEIGHT: 62 IN | SYSTOLIC BLOOD PRESSURE: 130 MMHG | BODY MASS INDEX: 23.55 KG/M2 | DIASTOLIC BLOOD PRESSURE: 78 MMHG | WEIGHT: 128 LBS

## 2020-03-23 DIAGNOSIS — M48.062 LUMBAR STENOSIS WITH NEUROGENIC CLAUDICATION: Primary | ICD-10-CM

## 2020-03-23 DIAGNOSIS — M48.062 LUMBAR STENOSIS WITH NEUROGENIC CLAUDICATION: ICD-10-CM

## 2020-03-23 DIAGNOSIS — Z87.81 HISTORY OF COMPRESSION FRACTURE OF VERTEBRAL COLUMN: ICD-10-CM

## 2020-03-23 DIAGNOSIS — M43.16 SPONDYLOLISTHESIS OF LUMBAR REGION: ICD-10-CM

## 2020-03-23 DIAGNOSIS — M81.0 OSTEOPOROSIS, UNSPECIFIED OSTEOPOROSIS TYPE, UNSPECIFIED PATHOLOGICAL FRACTURE PRESENCE: ICD-10-CM

## 2020-03-23 DIAGNOSIS — M51.36 DDD (DEGENERATIVE DISC DISEASE), LUMBAR: ICD-10-CM

## 2020-03-23 PROCEDURE — 99213 OFFICE O/P EST LOW 20 MIN: CPT | Performed by: NURSE PRACTITIONER

## 2020-06-02 ENCOUNTER — OFFICE VISIT (OUTPATIENT)
Dept: FAMILY MEDICINE CLINIC | Facility: CLINIC | Age: 70
End: 2020-06-02

## 2020-06-02 VITALS
BODY MASS INDEX: 21.17 KG/M2 | WEIGHT: 124 LBS | RESPIRATION RATE: 18 BRPM | SYSTOLIC BLOOD PRESSURE: 110 MMHG | OXYGEN SATURATION: 99 % | DIASTOLIC BLOOD PRESSURE: 80 MMHG | TEMPERATURE: 98 F | HEIGHT: 64 IN | HEART RATE: 77 BPM

## 2020-06-02 DIAGNOSIS — E78.2 MIXED HYPERLIPIDEMIA: ICD-10-CM

## 2020-06-02 DIAGNOSIS — I10 ESSENTIAL HYPERTENSION: ICD-10-CM

## 2020-06-02 DIAGNOSIS — Z00.00 MEDICARE ANNUAL WELLNESS VISIT, SUBSEQUENT: Primary | ICD-10-CM

## 2020-06-02 LAB
ALBUMIN SERPL-MCNC: 4.4 G/DL (ref 3.5–5.2)
ALBUMIN/GLOB SERPL: 1.7 G/DL
ALP SERPL-CCNC: 66 U/L (ref 39–117)
ALT SERPL W P-5'-P-CCNC: 17 U/L (ref 1–33)
ANION GAP SERPL CALCULATED.3IONS-SCNC: 10.7 MMOL/L (ref 5–15)
AST SERPL-CCNC: 20 U/L (ref 1–32)
BASOPHILS # BLD AUTO: 0.04 10*3/MM3 (ref 0–0.2)
BASOPHILS NFR BLD AUTO: 0.6 % (ref 0–1.5)
BILIRUB SERPL-MCNC: 0.5 MG/DL (ref 0.2–1.2)
BUN BLD-MCNC: 21 MG/DL (ref 8–23)
BUN/CREAT SERPL: 30 (ref 7–25)
CALCIUM SPEC-SCNC: 9.8 MG/DL (ref 8.6–10.5)
CHLORIDE SERPL-SCNC: 104 MMOL/L (ref 98–107)
CHOLEST SERPL-MCNC: 179 MG/DL (ref 0–200)
CO2 SERPL-SCNC: 25.3 MMOL/L (ref 22–29)
CREAT BLD-MCNC: 0.7 MG/DL (ref 0.57–1)
DEPRECATED RDW RBC AUTO: 42 FL (ref 37–54)
EOSINOPHIL # BLD AUTO: 0.15 10*3/MM3 (ref 0–0.4)
EOSINOPHIL NFR BLD AUTO: 2.3 % (ref 0.3–6.2)
ERYTHROCYTE [DISTWIDTH] IN BLOOD BY AUTOMATED COUNT: 12.3 % (ref 12.3–15.4)
GFR SERPL CREATININE-BSD FRML MDRD: 83 ML/MIN/1.73
GLOBULIN UR ELPH-MCNC: 2.6 GM/DL
GLUCOSE BLD-MCNC: 91 MG/DL (ref 65–99)
HCT VFR BLD AUTO: 42.3 % (ref 34–46.6)
HDLC SERPL-MCNC: 63 MG/DL (ref 40–60)
HGB BLD-MCNC: 14.7 G/DL (ref 12–15.9)
IMM GRANULOCYTES # BLD AUTO: 0.02 10*3/MM3 (ref 0–0.05)
IMM GRANULOCYTES NFR BLD AUTO: 0.3 % (ref 0–0.5)
LDLC SERPL CALC-MCNC: 96 MG/DL (ref 0–100)
LDLC/HDLC SERPL: 1.53 {RATIO}
LYMPHOCYTES # BLD AUTO: 1.58 10*3/MM3 (ref 0.7–3.1)
LYMPHOCYTES NFR BLD AUTO: 24.5 % (ref 19.6–45.3)
MCH RBC QN AUTO: 32.4 PG (ref 26.6–33)
MCHC RBC AUTO-ENTMCNC: 34.8 G/DL (ref 31.5–35.7)
MCV RBC AUTO: 93.2 FL (ref 79–97)
MONOCYTES # BLD AUTO: 0.71 10*3/MM3 (ref 0.1–0.9)
MONOCYTES NFR BLD AUTO: 11 % (ref 5–12)
NEUTROPHILS # BLD AUTO: 3.94 10*3/MM3 (ref 1.7–7)
NEUTROPHILS NFR BLD AUTO: 61.3 % (ref 42.7–76)
NRBC BLD AUTO-RTO: 0 /100 WBC (ref 0–0.2)
PLATELET # BLD AUTO: 260 10*3/MM3 (ref 140–450)
PMV BLD AUTO: 10.4 FL (ref 6–12)
POTASSIUM BLD-SCNC: 4.7 MMOL/L (ref 3.5–5.2)
PROT SERPL-MCNC: 7 G/DL (ref 6–8.5)
RBC # BLD AUTO: 4.54 10*6/MM3 (ref 3.77–5.28)
SODIUM BLD-SCNC: 140 MMOL/L (ref 136–145)
TRIGL SERPL-MCNC: 99 MG/DL (ref 0–150)
TSH SERPL DL<=0.05 MIU/L-ACNC: 0.43 UIU/ML (ref 0.27–4.2)
VLDLC SERPL-MCNC: 19.8 MG/DL (ref 5–40)
WBC NRBC COR # BLD: 6.44 10*3/MM3 (ref 3.4–10.8)

## 2020-06-02 PROCEDURE — 80061 LIPID PANEL: CPT | Performed by: FAMILY MEDICINE

## 2020-06-02 PROCEDURE — 80053 COMPREHEN METABOLIC PANEL: CPT | Performed by: FAMILY MEDICINE

## 2020-06-02 PROCEDURE — 84443 ASSAY THYROID STIM HORMONE: CPT | Performed by: FAMILY MEDICINE

## 2020-06-02 PROCEDURE — 85025 COMPLETE CBC W/AUTO DIFF WBC: CPT | Performed by: FAMILY MEDICINE

## 2020-06-02 PROCEDURE — G0439 PPPS, SUBSEQ VISIT: HCPCS | Performed by: FAMILY MEDICINE

## 2020-06-02 RX ORDER — LOSARTAN POTASSIUM 50 MG/1
50 TABLET ORAL DAILY
Qty: 90 TABLET | Refills: 3 | Status: SHIPPED | OUTPATIENT
Start: 2020-06-02 | End: 2021-06-21 | Stop reason: SDUPTHER

## 2020-06-02 RX ORDER — METOPROLOL SUCCINATE 50 MG/1
50 TABLET, EXTENDED RELEASE ORAL DAILY
Qty: 90 TABLET | Refills: 3 | Status: SHIPPED | OUTPATIENT
Start: 2020-06-02 | End: 2021-06-24 | Stop reason: SDUPTHER

## 2020-06-02 RX ORDER — ATORVASTATIN CALCIUM 10 MG/1
10 TABLET, FILM COATED ORAL DAILY
Qty: 90 TABLET | Refills: 3 | Status: SHIPPED | OUTPATIENT
Start: 2020-06-02 | End: 2021-06-21 | Stop reason: SDUPTHER

## 2020-06-02 NOTE — PROGRESS NOTES
Subsequent Medicare Wellness Visit   The ABC's of the Annual Wellness Visit    Chief Complaint   Patient presents with   • Medicare Wellness-subsequent       HPI:  Carleen Payne, -1950, is a 69 y.o. female who presents for a Subsequent Medicare Wellness Visit.    Recent Hospitalizations:  No hospitalization(s) within the last year..    Current Medical Providers:  Patient Care Team:  Priyanka Guardado DO as PCP - General (Family Medicine)  Stephen Crockett MD as PCP - Claims Attributed  Priyanka Guardado DO as Referring Physician (Family Medicine)  Herve Philippe MD as Consulting Physician (Pain Medicine)    Health Habits and Functional and Cognitive Screening and Depression Screening:  Functional & Cognitive Status 2020   Do you have difficulty preparing food and eating? No   Do you have difficulty bathing yourself, getting dressed or grooming yourself? No   Do you have difficulty using the toilet? No   Do you have difficulty moving around from place to place? No   Do you have trouble with steps or getting out of a bed or a chair? No   Current Diet Well Balanced Diet   Dental Exam Up to date   Eye Exam Up to date   Exercise (times per week) 0 times per week   Current Exercise Activities Include Aerobics   Do you need help using the phone?  No   Are you deaf or do you have serious difficulty hearing?  No   Do you need help with transportation? No   Do you need help shopping? No   Do you need help preparing meals?  No   Do you need help with housework?  No   Do you need help with laundry? No   Do you need help taking your medications? No   Do you need help managing money? No   Do you ever drive or ride in a car without wearing a seat belt? No   Have you felt unusual stress, anger or loneliness in the last month? No   Who do you live with? Spouse   If you need help, do you have trouble finding someone available to you? No   Have you been bothered in the last four weeks by sexual problems? No   Do you  have difficulty concentrating, remembering or making decisions? No       Compared to one year ago, the patient feels her physical health is the same and her mental health is the same.    Depression Screen:  PHQ-2/PHQ-9 Depression Screening 6/2/2020   Little interest or pleasure in doing things 0   Feeling down, depressed, or hopeless 0   Trouble falling or staying asleep, or sleeping too much -   Feeling tired or having little energy -   Poor appetite or overeating -   Feeling bad about yourself - or that you are a failure or have let yourself or your family down -   Trouble concentrating on things, such as reading the newspaper or watching television -   Moving or speaking so slowly that other people could have noticed. Or the opposite - being so fidgety or restless that you have been moving around a lot more than usual -   Thoughts that you would be better off dead, or of hurting yourself in some way -   Total Score 0   If you checked off any problems, how difficult have these problems made it for you to do your work, take care of things at home, or get along with other people? -         Past Medical/Family/Social History:  The following portions of the patient's history were reviewed and updated as appropriate: allergies, current medications, past family history, past medical history, past social history, past surgical history and problem list.    Allergies   Allergen Reactions   • Erythromycin Base Nausea And Vomiting   • Gabapentin Swelling     Body swelling         Current Outpatient Medications:   •  atorvastatin (Lipitor) 10 MG tablet, Take 1 tablet by mouth Daily., Disp: 90 tablet, Rfl: 3  •  Calcium 150 MG tablet, Take 150 mg by mouth Daily., Disp: , Rfl:   •  Cholecalciferol (VITAMIN D) 1000 UNITS tablet, Take 1,000 Units by mouth Daily., Disp: , Rfl:   •  Dietary Management Product (RHEUMATE) capsule, Take 1 capsule twice daily, Disp: 180 capsule, Rfl: 1  •  loratadine-pseudoephedrine (CLARITIN-D  24-hour)  MG per 24 hr tablet, Take 1 tablet by mouth Daily., Disp: 90 tablet, Rfl: 3  •  losartan (Cozaar) 50 MG tablet, Take 1 tablet by mouth Daily., Disp: 90 tablet, Rfl: 3  •  metoprolol succinate XL (TOPROL-XL) 50 MG 24 hr tablet, Take 1 tablet by mouth Daily., Disp: 90 tablet, Rfl: 3  •  vitamin B-6 (PYRIDOXINE) 100 MG tablet, Take 1 tablet by mouth Daily., Disp: 30 tablet, Rfl: 5    Aspirin use counseling: Does not need ASA (and currently is not on it)    Current medication list contains no high risk medications.  No harmful drug interactions have been identified.     Family History   Problem Relation Age of Onset   • Hypertension Mother    • Hypertension Father    • Stroke Father    • Breast cancer Neg Hx        Social History     Tobacco Use   • Smoking status: Never Smoker   • Smokeless tobacco: Never Used   Substance Use Topics   • Alcohol use: No       Past Surgical History:   Procedure Laterality Date   • NO PAST SURGERIES         Patient Active Problem List   Diagnosis   • Lumbar radiculopathy   • Right sided sciatica   • Leg pain   • Lumbar stenosis with neurogenic claudication   • History of compression fracture of vertebral column   • Osteoporosis   • Spondylolisthesis of lumbar region   • DDD (degenerative disc disease), lumbar       Review of Systems   Constitutional: Negative.  Negative for activity change, fatigue, fever and unexpected weight change.   HENT: Negative.  Negative for congestion, sneezing and sore throat.    Eyes: Negative.  Negative for visual disturbance.   Respiratory: Negative.  Negative for cough, chest tightness, shortness of breath and wheezing.    Cardiovascular: Negative.  Negative for chest pain, palpitations and leg swelling.   Gastrointestinal: Negative.  Negative for abdominal distention, abdominal pain, blood in stool, constipation, diarrhea and nausea.   Endocrine: Negative.  Negative for cold intolerance and heat intolerance.   Genitourinary: Negative.   "Negative for dysuria, frequency and urgency.   Musculoskeletal: Negative.  Negative for arthralgias and myalgias.   Skin: Negative.  Negative for rash.   Allergic/Immunologic: Negative.    Neurological: Negative.  Negative for dizziness, syncope and numbness.   Hematological: Negative.  Negative for adenopathy.   Psychiatric/Behavioral: Negative.  Negative for suicidal ideas. The patient is not nervous/anxious.        Objective     Vitals:    06/02/20 0806   BP: 110/80   BP Location: Left arm   Patient Position: Sitting   Cuff Size: Adult   Pulse: 77   Resp: 18   Temp: 98 °F (36.7 °C)   TempSrc: Temporal   SpO2: 99%   Weight: 56.2 kg (124 lb)   Height: 162.6 cm (64\")   PainSc: 0-No pain       Patient's Body mass index is 21.28 kg/m². BMI is within normal parameters. No follow-up required..      Hearing Screening Comments: Normal hearing  Vision Screening Comments: Per ophth    The patient has no evidence of cognitve impairment.     Physical Exam   Constitutional: She is oriented to person, place, and time. She appears well-developed and well-nourished. No distress.   HENT:   Right Ear: External ear normal.   Left Ear: External ear normal.   Nose: Nose normal.   Mouth/Throat: Oropharynx is clear and moist.   Eyes: Pupils are equal, round, and reactive to light. Conjunctivae and EOM are normal.   Neck: Normal range of motion. Neck supple. No thyromegaly present.   Cardiovascular: Normal rate, regular rhythm and normal heart sounds.   No murmur heard.  Pulmonary/Chest: Effort normal and breath sounds normal. No respiratory distress. She has no wheezes.   Abdominal: Soft. Bowel sounds are normal. She exhibits no distension and no mass. There is no tenderness. There is no rebound and no guarding. No hernia.   Musculoskeletal: Normal range of motion. She exhibits no edema or tenderness.   Lymphadenopathy:     She has no cervical adenopathy.   Neurological: She is alert and oriented to person, place, and time. She has " normal reflexes.   Skin: Skin is warm and dry. No rash noted. She is not diaphoretic. No erythema. No pallor.   Psychiatric: She has a normal mood and affect. Her behavior is normal. Judgment and thought content normal.   Nursing note and vitals reviewed.      Recent Lab Results:     Lab Results   Component Value Date    CHOL 192 05/28/2019    TRIG 95 05/28/2019    HDL 64 (H) 05/28/2019    VLDL 19 05/28/2019    LDLHDL 1.70 05/28/2019       Assessment/Plan   Age-appropriate Screening Schedule:  Refer to the list below for future screening recommendations based on patient's age, sex and/or medical conditions.      Health Maintenance   Topic Date Due   • LIPID PANEL  05/28/2020   • ZOSTER VACCINE (2 of 2) 06/02/2020 (Originally 11/3/2013)   • INFLUENZA VACCINE  08/01/2020   • MAMMOGRAM  08/14/2021   • DXA SCAN  08/14/2021   • TDAP/TD VACCINES (2 - Td) 09/21/2024   • COLONOSCOPY  07/09/2029       Medicare Risks and Personalized Health Plan:  Cardiovascular risk  Diabetic Lab Screening   Fall Risk      CMS-Preventive Services Quick Reference  Medicare Preventive Services Addressed:  Annual Wellness Visit (AWV)  Cardiovascular Disease Screening Tests (may do this order every 5 years in beneficiaries without signs or symptoms of cardiovascular disease)  Diabetes Screening-Lab Order for either glucose quantitative blood (except reagent strip), glucose;post glucose dose(includes glucose), or glucose tolerance test-3 specimens(includes glucose)    Advance Care Planning:  ACP discussion was held with the patient during this visit. Patient has an advance directive (not in EMR), copy requested.  Advance Care Planning   ACP discussion was held with the patient during this visit. Patient has an advance directive (not in EMR), copy requested.    Diagnoses and all orders for this visit:    1. Medicare annual wellness visit, subsequent (Primary)    2. Essential hypertension  -     CBC & Differential  -     Comprehensive Metabolic  Panel  -     losartan (Cozaar) 50 MG tablet; Take 1 tablet by mouth Daily.  Dispense: 90 tablet; Refill: 3  -     metoprolol succinate XL (TOPROL-XL) 50 MG 24 hr tablet; Take 1 tablet by mouth Daily.  Dispense: 90 tablet; Refill: 3  -     CBC Auto Differential    3. Mixed hyperlipidemia  -     Lipid Panel  -     TSH  -     atorvastatin (Lipitor) 10 MG tablet; Take 1 tablet by mouth Daily.  Dispense: 90 tablet; Refill: 3    Other orders  -     loratadine-pseudoephedrine (CLARITIN-D 24-hour)  MG per 24 hr tablet; Take 1 tablet by mouth Daily.  Dispense: 90 tablet; Refill: 3        An After Visit Summary and PPPS with all of these plans were given to the patient.      Follow Up:  Return in about 6 months (around 12/2/2020).

## 2020-06-02 NOTE — PATIENT INSTRUCTIONS
Advance Care Planning and Advance Directives     You make decisions on a daily basis - decisions about where you want to live, your career, your home, your life. Perhaps one of the most important decisions you face is your choice for future medical care. Take time to talk with your family and your healthcare team and start planning today.  Advance Care Planning is a process that can help you:  · Understand possible future healthcare decisions in light of your own experiences  · Reflect on those decision in light of your goals and values  · Discuss your decisions with those closest to you and the healthcare professionals that care for you  · Make a plan by creating a document that reflects your wishes    Surrogate Decision Maker  In the event of a medical emergency, which has left you unable to communicate or to make your own decisions, you would need someone to make decisions for you.  It is important to discuss your preferences for medical treatment with this person while you are in good health.     Qualities of a surrogate decision maker:  • Willing to take on this role and responsibility  • Knows what you want for future medical care  • Willing to follow your wishes even if they don't agree with them  • Able to make difficult medical decisions under stressful circumstances    Advance Directives  These are legal documents you can create that will guide your healthcare team and decision maker(s) when needed. These documents can be stored in the electronic medical record.    · Living Will - a legal document to guide your care if you have a terminal condition or a serious illness and are unable to communicate. States vary by statute in document names/types, but most forms may include one or more of the following:        -  Directions regarding life-prolonging treatments        -  Directions regarding artificially provided nutrition/hydration        -  Choosing a healthcare decision maker        -  Direction  regarding organ/tissue donation    · Durable Power of  for Healthcare - this document names an -in-fact to make medical decisions for you, but it may also allow this person to make personal and financial decisions for you. Please seek the advice of an  if you need this type of document.    **Advance Directives are not required and no one may discriminate against you if you do not sign one.    Medical Orders  Many states allow specific forms/orders signed by your physician to record your wishes for medical treatment in your current state of health. This form, signed in personal communication with your physician, addresses resuscitation and other medical interventions that you may or may not want.      For more information or to schedule a time with a [facility] Advance Care Planning Facilitator contact: [email or web address] or call [phone] and someone will contact you directly.

## 2020-07-27 ENCOUNTER — OFFICE VISIT (OUTPATIENT)
Dept: PAIN MEDICINE | Facility: CLINIC | Age: 70
End: 2020-07-27

## 2020-07-27 DIAGNOSIS — M48.062 LUMBAR STENOSIS WITH NEUROGENIC CLAUDICATION: ICD-10-CM

## 2020-07-27 DIAGNOSIS — Z87.81 HISTORY OF COMPRESSION FRACTURE OF VERTEBRAL COLUMN: ICD-10-CM

## 2020-07-27 DIAGNOSIS — M48.062 LUMBAR STENOSIS WITH NEUROGENIC CLAUDICATION: Primary | ICD-10-CM

## 2020-07-27 DIAGNOSIS — M81.0 OSTEOPOROSIS, UNSPECIFIED OSTEOPOROSIS TYPE, UNSPECIFIED PATHOLOGICAL FRACTURE PRESENCE: ICD-10-CM

## 2020-07-27 DIAGNOSIS — M51.36 DDD (DEGENERATIVE DISC DISEASE), LUMBAR: ICD-10-CM

## 2020-07-27 DIAGNOSIS — M43.16 SPONDYLOLISTHESIS OF LUMBAR REGION: ICD-10-CM

## 2020-07-27 PROCEDURE — 99441 PR PHYS/QHP TELEPHONE EVALUATION 5-10 MIN: CPT | Performed by: NURSE PRACTITIONER

## 2020-07-27 NOTE — PROGRESS NOTES
"This consultation was provided with the use of interactive audio through telephone that permits real time communication with the patient, as patient is unable to attend an in-office appointment due to the COVID-19 crisis. Consent for assessment and treatment was provided by the patient.    You have chosen to receive care through a telephone visit today. Do you consent to use a telephone visit for your medical care today?   Yes     Chief Complaint: \"I am doing well after my injection, therapy is helping.\"       History of Present Illness:   Patient: Ms. Carleen Payne, 70 y.o. female originally referred by Dr. Priyanka Guardado in consultation for intractable chronic lower back pain. Patient reports a 16-year history of lower back pain, which began after a fall.  Patient was last seen by me on May 23, 2020 for postprocedure follow-up and evaluation. On February 26, 2020, she underwent diagnostic and therapeutic right L4-L5 and right L5-S1, from which she reported experiencing 50% pain relief and functional improvement that was ongoing. She did participate in physical therapy as medically advised, and reports at times her pain ceases after physical therapy.  At that time of her follow up visit she was to be scheduled for another procedure. Unfortunately, soon thereafter due to the pandemic of Covid-19 we were unable to offer procedures.  She request consultation today to move forward with interventional pain management measures.  Pain description: constant pain with intermittent exacerbation, described as aching and burning sensation.   Radiation of pain: The lower back \"tailbone\" pain radiates into the posterior aspect of the right thigh, right calf to the ankle.    Pain intensity today: 4/10  Average pain intensity last week: 2/10  Pain intensity ranges from: 4/10 to 5/10  Aggravating factors: Pain increases with lying down.  Today she reports standing is significantly better and does not increase her pain to her " injection.  Alleviating factors: Pain decreases with analgesics, sitting.    Associated symptoms:   Patient denies numbness or weakness in the lower extremities  Patient denies any new bladder or bowel problems.   Patient denies difficulties with her balance or recent falls  Pain interferes with her sleep      Review of previous therapies and additional medical records:  Carleen Payne has already failed the following measures, including:   Conservative measures: oral analgesics and physical therapy   Interventional measures: 02/26/2020: DxTx right L4-L5 and right L5-S1 TESI   former patient of Dr. Talbert. She underwent pidural injections (RT L4-L5 transforaminals x4) with significant relief  Surgical measures: No history of lumbar spine surgery  Carleen Payne underwent neurosurgical consultation with Dr. Gomes on 06/16/2017, and was found not to be a surgical candidate.  Carleen Payne is a healthy adult other than her chronic pain and HTN.  In terms of current analgesics, Carleen Payne takes: aspirin PRN  I have reviewed Anatoly Report #[id number] consistent to medication reconciliation.     Global Pain Scale 06-20 2019 02-19 2020 03-23 2020             Pain  1  12  10             Feelings  0  0  0             Clinical outcomes  0  0  2             Activities  0  8  0             GPS Total:  3  20  12                Review of Diagnostic Studies:  XR SPINE LUMBAR FLEX AND EXT- FINDINGS: There is again late grade 1 or early grade 2 anterior subluxation of L4 on L5 similar to the prior study. No more than 2 mm of anterior subluxation is seen from extension to flexion. No significant movement is seen at the remaining levels. Late grade 1 or early grade 2 anterior subluxation of L4 on L5, with minimal change in subluxation from flexion to extension. No  other significant lumbar disease is seen elsewhere.  XR SPINE LUMBAR FLEX AND EXT- 01/25/2017: There is considerable anterolisthesis of L4 forward from L5 with  reduction of L4-L5 disc space and there is trace anterolisthesis of L5 forward from S1. Flexion and extension images reveal no evidence of instability   MRI of the lumbar spine without contrast on November 2, 2016: Radiology report. Mild disc desiccation at all levels. There are sagittal annular fissures at the lower 4 lumbar levels.  Axial imaging:  T12-L1: Small posterior left paracentral disc protrusion. Mild effacement of the ventral leftward thecal sac without significant spinal canal or NF stenosis   L1-L2: negative  L2-L3 and L3-L4: Mild lateral disc bulging.  No canal or NF stenosis  L4-L5: Moderate facet hypertrophy associated with bilateral joint effusions and a grade 1 anterolisthesis of L4 on L5. Mild circumferential disc bulge with mild superior pseudo-protrusion of disc material.  Mild narrowing of the neuroforamina at the thecal sac. There is significant narrowing of the superior right subarticular zone with effacement of the right L5 nerve root.  There is mild to moderate narrowing of the superior left subarticular zone with mild effacement of the left L5 nerve root sheath.  L5-S1: Mild disc space narrowing and mild facet hypertrophy. Mild right and mild to moderate left subarticular zone narrowing just above the level of the pedicles.  Mild effacement of the left S1 nerve root.  There may be slight effacement of the right S1 nerve root.  No narrowing of the thecal sac.  Mild neuroforaminal stenosis    Review of Systems   Allergic/Immunologic: Positive for environmental allergies.   All other systems reviewed and are negative.        Patient Active Problem List   Diagnosis   • Lumbar radiculopathy   • Right sided sciatica   • Leg pain   • Lumbar stenosis with neurogenic claudication   • History of compression fracture of vertebral column   • Osteoporosis   • Spondylolisthesis of lumbar region   • DDD (degenerative disc disease), lumbar       Past Medical History:   Diagnosis Date   • Hyperlipidemia     • Hypertension    • Osteoporosis          Past Surgical History:   Procedure Laterality Date   • NO PAST SURGERIES           Family History   Problem Relation Age of Onset   • Hypertension Mother    • Hypertension Father    • Stroke Father    • Breast cancer Neg Hx          Social History     Socioeconomic History   • Marital status:      Spouse name: Not on file   • Number of children: Not on file   • Years of education: Not on file   • Highest education level: Not on file   Tobacco Use   • Smoking status: Never Smoker   • Smokeless tobacco: Never Used   Substance and Sexual Activity   • Alcohol use: No   • Drug use: No   • Sexual activity: Yes     Partners: Male           Current Outpatient Medications:   •  atorvastatin (Lipitor) 10 MG tablet, Take 1 tablet by mouth Daily., Disp: 90 tablet, Rfl: 3  •  Calcium 150 MG tablet, Take 150 mg by mouth Daily., Disp: , Rfl:   •  Cholecalciferol (VITAMIN D) 1000 UNITS tablet, Take 1,000 Units by mouth Daily., Disp: , Rfl:   •  Dietary Management Product (RHEUMATE) capsule, Take 1 capsule twice daily, Disp: 180 capsule, Rfl: 1  •  loratadine-pseudoephedrine (CLARITIN-D 24-hour)  MG per 24 hr tablet, Take 1 tablet by mouth Daily., Disp: 90 tablet, Rfl: 3  •  losartan (Cozaar) 50 MG tablet, Take 1 tablet by mouth Daily., Disp: 90 tablet, Rfl: 3  •  metoprolol succinate XL (TOPROL-XL) 50 MG 24 hr tablet, Take 1 tablet by mouth Daily., Disp: 90 tablet, Rfl: 3  •  vitamin B-6 (PYRIDOXINE) 100 MG tablet, Take 1 tablet by mouth Daily., Disp: 30 tablet, Rfl: 5      Allergies   Allergen Reactions   • Erythromycin Base Nausea And Vomiting   • Gabapentin Swelling     Body swelling         There were no vitals taken for this visit.     Due to the constraints of the telemedicine format, no physical exam was performed      Physical Exam: (Previous PE from last office visit)  Constitutional: Patient is oriented to person, place, and time. Patient appears well-developed and  well-nourished.   HEENT: Head: Normocephalic and atraumatic. Eyes: Conjunctivae and lids are normal. Pupils: Equal, round, reactive to light.   Neck: Trachea normal. Neck supple. No JVD present.   Pulmonary Respiratory effort: No increased work of breathing or signs of respiratory distress. Auscultation of lungs: Clear to auscultation.   Cardiovascular Auscultation of heart: Normal rate and rhythm, normal S1 and S2, no murmurs.   Peripheral vascular exam: Femoral: right 2+, left 2+. Posterior tibialis: right 2+ and left 2+. Dorsalis pedis: right 2+ and left 2+. No edema. Musculoskeletal   Gait and station: Gait evaluation demonstrated a normal gait   Lumbar spine: Passive and active range of motion are full and without pain. Extension, flexion, lateral flexion, rotation of the lumbar spine did not increase or reproduce pain. Lumbar facet joint loading maneuvers are negative.   Rodrigo test and Gaenslen's test are negative   Piriformis maneuvers are negative   Palpation of the bilateral ischial tuberosities, unrevealing   Palpation of the bilateral greater trochanter, unrevealing   Examination of the Iliotibial band: unrevealing   Hip joints: The range of motion of the hip joints is full and without pain   Neurological: Patient is alert and oriented to person, place, and time. Speech: speech is normal. Cortical function: Normal mental status.   Cranial nerves: Cranial nerves 2-12 intact.   Reflex Scores:  Right patellar: 2+  Left patellar: 2+  Right Achilles: 2+  Left Achilles: 2+  Motor strength: 5/5  Motor Tone: normal tone.   Involuntary movements: none.   Superficial/Primitive Reflexes: primitive reflexes were absent.   Right Carcamo: absent  Left Carcamo: absent  Right ankle clonus: absent  Left ankle clonus: absent   Negative long tract signs. Straight leg raising test is negative. Femoral stretch sign is negative.   Sensation: No sensory loss. Sensory exam: intact to light touch, intact pain and temperature  sensation, intact vibration sensation and normal proprioception.   Coordination: Normal finger to nose and heel to shin. Normal balance and negative Romberg's sign   Skin and subcutaneous tissue: Skin is warm and intact. No rash noted. No cyanosis.   Psychiatric: Judgment and insight: Normal. Orientation to person, place and time: Normal. Recent and remote memory: Intact. Mood and affect: Normal.     ASSESSMENT:   1. Lumbar stenosis with neurogenic claudication    2. Spondylolisthesis of lumbar region    3. DDD (degenerative disc disease), lumbar    4. Osteoporosis, unspecified osteoporosis type, unspecified pathological fracture presence    5. History of compression fracture of vertebral column        PLAN/MEDICAL DECISION MAKING: I had a lengthy conversation with Ms. Carleen Payne regarding her chronic pain condition and potential therapeutic options including risks, benefits, alternative therapies, to name a few. Patient presents with a several year history of lower back pain. Some of the pain extends into the posterior aspect of her thigh and right calf and ankle. Studies demonstrated an L4-5 spondylolisthesis, stenosis. She does have a history of osteoporosis. Patient has failed to obtain pain relief with conservative measures, as referenced above. Patient experienced significant pain relief from interventional pain management measures, as referenced above. I have reviewed all available patient's medical records as well as previous therapies as referenced above, and discussed the patient with Dr. Philippe.  Therefore, I have proposed the following plan:  1.Pharmacological measures: Reviewed. Discussed.   A. Patient takes aspirin PRN  2. Interventional pain management measures: Patient will be scheduled for  right L4-L5 and right L5-S1 transforaminal epidural steroid injections. We may repeat epidurals depending on patient's outcome, or facilitate NS follow-up with Dr Gomes.    3. Long-term rehabilitation  efforts:  A. The patient does not have a history of falls. I did complete a risk assessment for falls  B. Patient will continue a comprehensive physical therapy program for water therapy, therapeutic exercise, core strengthening, gait and balance training, neurodynamics, myofascial release, cupping and dry needling if pain recurs  C. Start an exercise program such as yoga, Pilates, Juan Daniel Chi and water therapy  D. Contrast therapy: Apply ice-packs for 15-20 minutes, followed by heating pads for 15-20 minutes to affected area   4. The patient has been instructed to contact my office with any questions or difficulties. The patient understands the plan and agrees to proceed accordingly.    Patient has been made aware that patient will continue to have access to telephone call, tele-health or e-visit, or in office visit if an emergent or urgent issue arises.     This visit was performed via Telehealth. Patient was advised to call us back or contact a primary care provider, Urgent Care or the Emergency Department if symptoms worsen or treatment provided does not resolve symptoms. Patient verbalizes understanding of medication dosage, comfort measures, instructions for treatment, and follow-up.    This visit has been scheduled as a phone visit to comply with patient safety concerns in accordance with CDC recommendations. Total time of discussion was 10 minutes.         Patient Care Team:  Priyanka Guardado DO as PCP - General (Family Medicine)  Flores Stratton APRN as PCP - Claims Attributed  Priyanka Guardado DO as Referring Physician (Family Medicine)  Herve Philippe MD as Consulting Physician (Pain Medicine)     No orders of the defined types were placed in this encounter.        Future Appointments   Date Time Provider Department Center   7/27/2020  1:30 PM Flores Stratton APRN [facility] None   12/2/2020  8:45 AM Priyanka Guardado DO [facility] None         RAYMOND Lorenzana     [facility] Dragon/Transcription  disclaimer:  Much of this encounter note is an electronic transcription of spoken language to printed text. Electronic transcription of spoken language may permit erroneous, or at times, nonsensical words or phrases to be inadvertently transcribed. Although I have reviewed the note for such errors, some may still exist.

## 2020-08-11 DIAGNOSIS — M48.062 LUMBAR STENOSIS WITH NEUROGENIC CLAUDICATION: Primary | ICD-10-CM

## 2020-08-14 ENCOUNTER — APPOINTMENT (OUTPATIENT)
Dept: PREADMISSION TESTING | Facility: HOSPITAL | Age: 70
End: 2020-08-14

## 2020-08-14 PROCEDURE — C9803 HOPD COVID-19 SPEC COLLECT: HCPCS

## 2020-08-14 PROCEDURE — U0002 COVID-19 LAB TEST NON-CDC: HCPCS

## 2020-08-14 PROCEDURE — U0004 COV-19 TEST NON-CDC HGH THRU: HCPCS

## 2020-08-15 LAB
REF LAB TEST METHOD: NORMAL
SARS-COV-2 RNA RESP QL NAA+PROBE: NOT DETECTED

## 2020-08-17 ENCOUNTER — OUTSIDE FACILITY SERVICE (OUTPATIENT)
Dept: PAIN MEDICINE | Facility: CLINIC | Age: 70
End: 2020-08-17

## 2020-08-17 PROCEDURE — 64483 NJX AA&/STRD TFRM EPI L/S 1: CPT | Performed by: ANESTHESIOLOGY

## 2020-08-17 PROCEDURE — 99152 MOD SED SAME PHYS/QHP 5/>YRS: CPT | Performed by: ANESTHESIOLOGY

## 2020-08-17 PROCEDURE — 64484 NJX AA&/STRD TFRM EPI L/S EA: CPT | Performed by: ANESTHESIOLOGY

## 2020-08-18 ENCOUNTER — TELEPHONE (OUTPATIENT)
Dept: PAIN MEDICINE | Facility: CLINIC | Age: 70
End: 2020-08-18

## 2020-11-01 NOTE — PROGRESS NOTES
"This consultation was provided with the use of interactive audio through telephone that permits real time communication with the patient, as patient is unable to attend an in-office appointment due to the COVID-19 crisis. Consent for assessment and treatment was provided by the patient.  You have chosen to receive care through a telephone visit today. Do you consent to use a telephone visit for your medical care today?   Yes     Chief Complaint: \"I am doing well after my injection.\"       History of Present Illness:   Patient: Ms. Carleen Payne, 70 y.o. female originally referred by Dr. Priyanka Guardado in consultation for intractable chronic lower back pain. Patient reports a 16-year history of lower back pain, which began after a fall.  Patient was last seen on August 17, 2020, when she underwent right L4-L5 and right L5-S1 transforaminal epidural steroid injections, from which she reports experiencing 95% pain relief and functional improvement that is ongoing. In addition on February 26, 2020, she underwent diagnostic and therapeutic right L4-L5 and right L5-S1, from which she reported experiencing 50% pain relief and functional improvement that lasting about four months. She did participate in physical therapy as medically advised, and reports at times her pain ceases after physical therapy.  She requests consultation today for postprocedure follow up and evaluation.   Pain description: Previously a constant pain with intermittent exacerbation, described as aching and burning sensation.   Radiation of pain: Previously the lower back \"tailbone\" pain radiated into the posterior aspect of the right thigh, right calf to the ankle.    Pain intensity today: 0-1/10  Average pain intensity last week: 2/10  Pain intensity ranges from: 0/10 to 6/10  Aggravating factors: Pain increases with lying down.  Today she reports standing is significantly better and does not increase her pain to her injection.  Alleviating factors: " Pain decreases with analgesics, sitting.    Associated symptoms:   Patient denies numbness or weakness in the lower extremities  Patient denies any new bladder or bowel problems.   Patient denies difficulties with her balance or recent falls  Pain no longer interferes with her sleep, since       Review of previous therapies and additional medical records:  Carleen Payne has already failed the following measures, including:   Conservative measures: oral analgesics and physical therapy   Interventional measures: 02/26/2020: DxTx right L4-L5 and right L5-S1 TESI  08/17/2020: right L4-L5 and right L5-S1 TESI  former patient of Dr. Talbert. She underwent pidural injections (RT L4-L5 transforaminals x4) with significant relief  Surgical measures: No history of lumbar spine surgery  Carleen Payne underwent neurosurgical consultation with Dr. Gomes on 06/16/2017, and was found not to be a surgical candidate.  Carleen Payne is a healthy adult other than her chronic pain and HTN.  In terms of current analgesics, Carleen Payne takes: aspirin PRN  I have reviewed Anatoly Report #[id number] consistent to medication reconciliation.     Global Pain Scale 06-20 2019 02-19 2020 03-23 2020             Pain  1  12  10             Feelings  0  0  0             Clinical outcomes  0  0  2             Activities  0  8  0             GPS Total:  3  20  12                Review of Diagnostic Studies:  XR SPINE LUMBAR FLEX AND EXT- FINDINGS: There is again late grade 1 or early grade 2 anterior subluxation of L4 on L5 similar to the prior study. No more than 2 mm of anterior subluxation is seen from extension to flexion. No significant movement is seen at the remaining levels. Late grade 1 or early grade 2 anterior subluxation of L4 on L5, with minimal change in subluxation from flexion to extension. No  other significant lumbar disease is seen elsewhere.  XR SPINE LUMBAR FLEX AND EXT- 01/25/2017: There is considerable anterolisthesis of  L4 forward from L5 with reduction of L4-L5 disc space and there is trace anterolisthesis of L5 forward from S1. Flexion and extension images reveal no evidence of instability   MRI of the lumbar spine without contrast on November 2, 2016: Radiology report. Mild disc desiccation at all levels. There are sagittal annular fissures at the lower 4 lumbar levels.  Axial imaging:  T12-L1: Small posterior left paracentral disc protrusion. Mild effacement of the ventral leftward thecal sac without significant spinal canal or NF stenosis   L1-L2: negative  L2-L3 and L3-L4: Mild lateral disc bulging.  No canal or NF stenosis  L4-L5: Moderate facet hypertrophy associated with bilateral joint effusions and a grade 1 anterolisthesis of L4 on L5. Mild circumferential disc bulge with mild superior pseudo-protrusion of disc material.  Mild narrowing of the neuroforamina at the thecal sac. There is significant narrowing of the superior right subarticular zone with effacement of the right L5 nerve root.  There is mild to moderate narrowing of the superior left subarticular zone with mild effacement of the left L5 nerve root sheath.  L5-S1: Mild disc space narrowing and mild facet hypertrophy. Mild right and mild to moderate left subarticular zone narrowing just above the level of the pedicles.  Mild effacement of the left S1 nerve root.  There may be slight effacement of the right S1 nerve root.  No narrowing of the thecal sac.  Mild neuroforaminal stenosis    Review of Systems   Allergic/Immunologic: Positive for environmental allergies.   All other systems reviewed and are negative.        Patient Active Problem List   Diagnosis   • Lumbar radiculopathy   • Right sided sciatica   • Leg pain   • Lumbar stenosis with neurogenic claudication   • History of compression fracture of vertebral column   • Osteoporosis   • Spondylolisthesis of lumbar region   • DDD (degenerative disc disease), lumbar       Past Medical History:   Diagnosis  Date   • Hyperlipidemia    • Hypertension    • Osteoporosis          Past Surgical History:   Procedure Laterality Date   • NO PAST SURGERIES           Family History   Problem Relation Age of Onset   • Hypertension Mother    • Hypertension Father    • Stroke Father    • Breast cancer Neg Hx          Social History     Socioeconomic History   • Marital status:      Spouse name: Not on file   • Number of children: Not on file   • Years of education: Not on file   • Highest education level: Not on file   Tobacco Use   • Smoking status: Never Smoker   • Smokeless tobacco: Never Used   Substance and Sexual Activity   • Alcohol use: No   • Drug use: No   • Sexual activity: Yes     Partners: Male           Current Outpatient Medications:   •  atorvastatin (Lipitor) 10 MG tablet, Take 1 tablet by mouth Daily., Disp: 90 tablet, Rfl: 3  •  Calcium 150 MG tablet, Take 150 mg by mouth Daily., Disp: , Rfl:   •  Cholecalciferol (VITAMIN D) 1000 UNITS tablet, Take 1,000 Units by mouth Daily., Disp: , Rfl:   •  loratadine-pseudoephedrine (CLARITIN-D 24-hour)  MG per 24 hr tablet, Take 1 tablet by mouth Daily., Disp: 90 tablet, Rfl: 3  •  losartan (Cozaar) 50 MG tablet, Take 1 tablet by mouth Daily., Disp: 90 tablet, Rfl: 3  •  metoprolol succinate XL (TOPROL-XL) 50 MG 24 hr tablet, Take 1 tablet by mouth Daily., Disp: 90 tablet, Rfl: 3      Allergies   Allergen Reactions   • Erythromycin Base Nausea And Vomiting   • Gabapentin Swelling     Body swelling         There were no vitals taken for this visit.     Due to the constraints of the telemedicine format, no physical exam was performed      Physical Exam: (Previous PE from last office visit)  Constitutional: Patient is oriented to person, place, and time. Patient appears well-developed and well-nourished.   HEENT: Head: Normocephalic and atraumatic. Eyes: Conjunctivae and lids are normal. Pupils: Equal, round, reactive to light.   Neck: Trachea normal. Neck supple.  No JVD present.   Pulmonary Respiratory effort: No increased work of breathing or signs of respiratory distress. Auscultation of lungs: Clear to auscultation.   Cardiovascular Auscultation of heart: Normal rate and rhythm, normal S1 and S2, no murmurs.   Peripheral vascular exam: Femoral: right 2+, left 2+. Posterior tibialis: right 2+ and left 2+. Dorsalis pedis: right 2+ and left 2+. No edema. Musculoskeletal   Gait and station: Gait evaluation demonstrated a normal gait   Lumbar spine: Passive and active range of motion are full and without pain. Extension, flexion, lateral flexion, rotation of the lumbar spine did not increase or reproduce pain. Lumbar facet joint loading maneuvers are negative.   Rodrigo test and Gaenslen's test are negative   Piriformis maneuvers are negative   Palpation of the bilateral ischial tuberosities, unrevealing   Palpation of the bilateral greater trochanter, unrevealing   Examination of the Iliotibial band: unrevealing   Hip joints: The range of motion of the hip joints is full and without pain   Neurological: Patient is alert and oriented to person, place, and time. Speech: speech is normal. Cortical function: Normal mental status.   Cranial nerves: Cranial nerves 2-12 intact.   Reflex Scores:  Right patellar: 2+  Left patellar: 2+  Right Achilles: 2+  Left Achilles: 2+  Motor strength: 5/5  Motor Tone: normal tone.   Involuntary movements: none.   Superficial/Primitive Reflexes: primitive reflexes were absent.   Right Carcamo: absent  Left Carcamo: absent  Right ankle clonus: absent  Left ankle clonus: absent   Negative long tract signs. Straight leg raising test is negative. Femoral stretch sign is negative.   Sensation: No sensory loss. Sensory exam: intact to light touch, intact pain and temperature sensation, intact vibration sensation and normal proprioception.   Coordination: Normal finger to nose and heel to shin. Normal balance and negative Romberg's sign   Skin and  subcutaneous tissue: Skin is warm and intact. No rash noted. No cyanosis.   Psychiatric: Judgment and insight: Normal. Orientation to person, place and time: Normal. Recent and remote memory: Intact. Mood and affect: Normal.     ASSESSMENT:   1. Lumbar stenosis with neurogenic claudication    2. Spondylolisthesis of lumbar region    3. DDD (degenerative disc disease), lumbar    4. History of compression fracture of vertebral column    5. Osteoporosis, unspecified osteoporosis type, unspecified pathological fracture presence        PLAN/MEDICAL DECISION MAKING: I had a lengthy conversation with Ms. Carleen Payne regarding her chronic pain condition and potential therapeutic options including risks, benefits, alternative therapies, to name a few. Patient presents with a several year history of lower back pain. Some of the pain extends into the posterior aspect of her thigh and right calf and ankle. Studies demonstrated an L4-5 spondylolisthesis, stenosis. She does have a history of osteoporosis.  She reports almost complete pain relief from most recent transforaminal epidural, as referenced above.  Patient has failed to obtain pain relief with conservative measures, as referenced above. Patient experienced significant pain relief from interventional pain management measures, as referenced above. I have reviewed all available patient's medical records as well as previous therapies as referenced above, and discussed the patient with Dr. Philippe.  Therefore, I have proposed the following plan:  1. Pharmacological measures: Reviewed. Discussed.   A. Patient takes aspirin PRN  2. Interventional pain management measures: None indicated at this time. Follow up on an as needed basis. If pain recurs, we could repeat right L4-L5 and right L5-S1 transforaminal epidural steroid injections. We may repeat epidurals depending on patient's outcome, or facilitate NS follow-up with Dr Gomes. 3. Long-term rehabilitation  efforts:  A. The patient does not have a history of falls. I did complete a risk assessment for falls  B. Patient will continue a comprehensive physical therapy program for water therapy, therapeutic exercise, core strengthening, gait and balance training, neurodynamics, myofascial release, cupping and dry needling if pain recurs  C. Start an exercise program such as yoga, Pilates, Juan Daniel Chi and water therapy  D. Contrast therapy: Apply ice-packs for 15-20 minutes, followed by heating pads for 15-20 minutes to affected area   4. The patient has been instructed to contact my office with any questions or difficulties. The patient understands the plan and agrees to proceed accordingly.    Patient has been made aware that patient will continue to have access to telephone call, tele-health or e-visit, or in office visit if an emergent or urgent issue arises.     This visit was performed via Telehealth. Patient was advised to call us back or contact a primary care provider, Urgent Care or the Emergency Department if symptoms worsen or treatment provided does not resolve symptoms. Patient verbalizes understanding of medication dosage, comfort measures, instructions for treatment, and follow-up.    This visit has been scheduled as a phone visit to comply with patient safety concerns in accordance with CDC recommendations. Total time of discussion was 8 minutes.         Patient Care Team:  Priyanka Guardado DO as PCP - General (Family Medicine)  Priyanka Guardado DO as Referring Physician (Family Medicine)  Herve Philippe MD as Consulting Physician (Pain Medicine)     No orders of the defined types were placed in this encounter.        Future Appointments   Date Time Provider Department Center   12/2/2020  8:45 AM [facility] None         RAYMOND Lorenzana     EMR Dragon/Transcription disclaimer:  Much of this encounter note is an electronic transcription of spoken language to printed text. Electronic  transcription of spoken language may permit erroneous, or at times, nonsensical words or phrases to be inadvertently transcribed. Although I have reviewed the note for such errors, some may still exist.

## 2020-11-02 ENCOUNTER — OFFICE VISIT (OUTPATIENT)
Dept: PAIN MEDICINE | Facility: CLINIC | Age: 70
End: 2020-11-02

## 2020-11-02 DIAGNOSIS — M48.062 LUMBAR STENOSIS WITH NEUROGENIC CLAUDICATION: ICD-10-CM

## 2020-11-02 DIAGNOSIS — M43.16 SPONDYLOLISTHESIS OF LUMBAR REGION: ICD-10-CM

## 2020-11-02 DIAGNOSIS — M51.36 DDD (DEGENERATIVE DISC DISEASE), LUMBAR: ICD-10-CM

## 2020-11-02 DIAGNOSIS — Z87.81 HISTORY OF COMPRESSION FRACTURE OF VERTEBRAL COLUMN: ICD-10-CM

## 2020-11-02 DIAGNOSIS — M81.0 OSTEOPOROSIS, UNSPECIFIED OSTEOPOROSIS TYPE, UNSPECIFIED PATHOLOGICAL FRACTURE PRESENCE: ICD-10-CM

## 2020-11-02 PROCEDURE — 99441 PR PHYS/QHP TELEPHONE EVALUATION 5-10 MIN: CPT | Performed by: NURSE PRACTITIONER

## 2020-12-02 ENCOUNTER — OFFICE VISIT (OUTPATIENT)
Dept: FAMILY MEDICINE CLINIC | Facility: CLINIC | Age: 70
End: 2020-12-02

## 2020-12-02 VITALS
DIASTOLIC BLOOD PRESSURE: 74 MMHG | OXYGEN SATURATION: 99 % | TEMPERATURE: 97.8 F | SYSTOLIC BLOOD PRESSURE: 122 MMHG | BODY MASS INDEX: 21.92 KG/M2 | HEART RATE: 89 BPM | HEIGHT: 64 IN | RESPIRATION RATE: 16 BRPM | WEIGHT: 128.4 LBS

## 2020-12-02 DIAGNOSIS — R41.3 MEMORY LOSS: Primary | ICD-10-CM

## 2020-12-02 DIAGNOSIS — B35.1 TOENAIL FUNGUS: ICD-10-CM

## 2020-12-02 DIAGNOSIS — R20.0 LEFT ARM NUMBNESS: ICD-10-CM

## 2020-12-02 LAB
ALBUMIN SERPL-MCNC: 4.5 G/DL (ref 3.5–5.2)
ALBUMIN/GLOB SERPL: 1.7 G/DL
ALP SERPL-CCNC: 87 U/L (ref 39–117)
ALT SERPL W P-5'-P-CCNC: 22 U/L (ref 1–33)
ANION GAP SERPL CALCULATED.3IONS-SCNC: 13.4 MMOL/L (ref 5–15)
AST SERPL-CCNC: 28 U/L (ref 1–32)
BASOPHILS # BLD AUTO: 0.03 10*3/MM3 (ref 0–0.2)
BASOPHILS NFR BLD AUTO: 0.4 % (ref 0–1.5)
BILIRUB SERPL-MCNC: 0.4 MG/DL (ref 0–1.2)
BUN SERPL-MCNC: 17 MG/DL (ref 8–23)
BUN/CREAT SERPL: 22.1 (ref 7–25)
CALCIUM SPEC-SCNC: 10.1 MG/DL (ref 8.6–10.5)
CHLORIDE SERPL-SCNC: 102 MMOL/L (ref 98–107)
CO2 SERPL-SCNC: 28.6 MMOL/L (ref 22–29)
CREAT SERPL-MCNC: 0.77 MG/DL (ref 0.57–1)
DEPRECATED RDW RBC AUTO: 43.2 FL (ref 37–54)
EOSINOPHIL # BLD AUTO: 0.17 10*3/MM3 (ref 0–0.4)
EOSINOPHIL NFR BLD AUTO: 2.1 % (ref 0.3–6.2)
ERYTHROCYTE [DISTWIDTH] IN BLOOD BY AUTOMATED COUNT: 12.4 % (ref 12.3–15.4)
GFR SERPL CREATININE-BSD FRML MDRD: 75 ML/MIN/1.73
GLOBULIN UR ELPH-MCNC: 2.7 GM/DL
GLUCOSE SERPL-MCNC: 79 MG/DL (ref 65–99)
HCT VFR BLD AUTO: 42.6 % (ref 34–46.6)
HGB BLD-MCNC: 14.5 G/DL (ref 12–15.9)
IMM GRANULOCYTES # BLD AUTO: 0.02 10*3/MM3 (ref 0–0.05)
IMM GRANULOCYTES NFR BLD AUTO: 0.2 % (ref 0–0.5)
LYMPHOCYTES # BLD AUTO: 1.55 10*3/MM3 (ref 0.7–3.1)
LYMPHOCYTES NFR BLD AUTO: 18.8 % (ref 19.6–45.3)
MCH RBC QN AUTO: 32.1 PG (ref 26.6–33)
MCHC RBC AUTO-ENTMCNC: 34 G/DL (ref 31.5–35.7)
MCV RBC AUTO: 94.2 FL (ref 79–97)
MONOCYTES # BLD AUTO: 0.68 10*3/MM3 (ref 0.1–0.9)
MONOCYTES NFR BLD AUTO: 8.3 % (ref 5–12)
NEUTROPHILS NFR BLD AUTO: 5.79 10*3/MM3 (ref 1.7–7)
NEUTROPHILS NFR BLD AUTO: 70.2 % (ref 42.7–76)
NRBC BLD AUTO-RTO: 0 /100 WBC (ref 0–0.2)
PLATELET # BLD AUTO: 262 10*3/MM3 (ref 140–450)
PMV BLD AUTO: 10.3 FL (ref 6–12)
POTASSIUM SERPL-SCNC: 4.1 MMOL/L (ref 3.5–5.2)
PROT SERPL-MCNC: 7.2 G/DL (ref 6–8.5)
RBC # BLD AUTO: 4.52 10*6/MM3 (ref 3.77–5.28)
SODIUM SERPL-SCNC: 144 MMOL/L (ref 136–145)
VIT B12 BLD-MCNC: 2000 PG/ML (ref 211–946)
WBC # BLD AUTO: 8.24 10*3/MM3 (ref 3.4–10.8)

## 2020-12-02 PROCEDURE — 85025 COMPLETE CBC W/AUTO DIFF WBC: CPT | Performed by: FAMILY MEDICINE

## 2020-12-02 PROCEDURE — 99214 OFFICE O/P EST MOD 30 MIN: CPT | Performed by: FAMILY MEDICINE

## 2020-12-02 PROCEDURE — 36415 COLL VENOUS BLD VENIPUNCTURE: CPT | Performed by: FAMILY MEDICINE

## 2020-12-02 PROCEDURE — 82607 VITAMIN B-12: CPT | Performed by: FAMILY MEDICINE

## 2020-12-02 PROCEDURE — 80053 COMPREHEN METABOLIC PANEL: CPT | Performed by: FAMILY MEDICINE

## 2020-12-02 RX ORDER — TERBINAFINE HYDROCHLORIDE 250 MG/1
250 TABLET ORAL DAILY
Qty: 90 TABLET | Refills: 0 | Status: SHIPPED | OUTPATIENT
Start: 2020-12-02 | End: 2022-02-08

## 2020-12-02 NOTE — PROGRESS NOTES
Subjective   Carleen Payne is a 70 y.o. female.     History of Present Illness   Concerned about recent memory loss. Worried mostly about short term memory loss over the past 1-2 months. She states  has noticed. She has had stress due to COVID.   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  Sleep is sporadic. Worse with stress. Has some pain in left arm with numbness in left arm and fingers. Concerned about ministrokes. Denies HA, vision change.    C/O swollen glands. Had this summer and had antibiotics with some relief, but glands started to swell again.  Has toe fungus and would like treatment for.    The following portions of the patient's history were reviewed and updated as appropriate: allergies, current medications, past family history, past medical history, past social history, past surgical history and problem list.  Vitals:    12/02/20 0846   BP: 122/74   Pulse: 89   Resp: 16   Temp: 97.8 °F (36.6 °C)   SpO2: 99%     Body mass index is 22.04 kg/m².  Review of Systems   Constitutional: Negative.  Negative for activity change, diaphoresis, fatigue, fever, unexpected weight gain and unexpected weight loss.   HENT: Negative.  Negative for congestion, sneezing and sore throat.    Eyes: Negative.  Negative for blurred vision, double vision and visual disturbance.   Respiratory: Negative.  Negative for cough, chest tightness, shortness of breath and wheezing.    Cardiovascular: Negative.  Negative for chest pain, palpitations and leg swelling.   Gastrointestinal: Negative.  Negative for abdominal distention, abdominal pain, blood in stool, constipation, diarrhea, nausea and vomiting.   Endocrine: Negative.  Negative for cold intolerance and heat intolerance.   Genitourinary: Negative.  Negative for dysuria, frequency, urgency and urinary  incontinence.   Musculoskeletal: Negative.  Negative for arthralgias, back pain, myalgias and neck pain.   Skin: Negative.  Negative for rash.   Allergic/Immunologic: Negative.    Neurological: Positive for memory problem and confusion. Negative for dizziness, syncope, weakness, light-headedness and numbness.   Hematological: Negative.  Negative for adenopathy.   Psychiatric/Behavioral: Negative for suicidal ideas and depressed mood. The patient is not nervous/anxious.    All other systems reviewed and are negative.      Objective   Physical Exam  Vitals signs and nursing note reviewed.   Constitutional:       General: She is not in acute distress.     Appearance: She is well-developed. She is not diaphoretic.   HENT:      Right Ear: External ear normal.      Left Ear: External ear normal.      Nose: Nose normal.   Eyes:      Conjunctiva/sclera: Conjunctivae normal.      Pupils: Pupils are equal, round, and reactive to light.   Neck:      Musculoskeletal: Normal range of motion and neck supple.      Thyroid: No thyromegaly.   Cardiovascular:      Rate and Rhythm: Normal rate and regular rhythm.      Heart sounds: Normal heart sounds. No murmur.   Pulmonary:      Effort: Pulmonary effort is normal. No respiratory distress.      Breath sounds: Normal breath sounds. No wheezing.   Abdominal:      General: Bowel sounds are normal. There is no distension.      Palpations: Abdomen is soft. There is no mass.      Tenderness: There is no abdominal tenderness. There is no guarding or rebound.      Hernia: No hernia is present.   Musculoskeletal: Normal range of motion.         General: No tenderness.   Lymphadenopathy:      Cervical: No cervical adenopathy.   Skin:     General: Skin is warm and dry.      Coloration: Skin is not pale.      Findings: No erythema or rash.   Neurological:      Mental Status: She is alert and oriented to person, place, and time.      Deep Tendon Reflexes: Reflexes are normal and symmetric.    Psychiatric:         Behavior: Behavior normal.         Thought Content: Thought content normal.         Judgment: Judgment normal.     ACE Mini mental 23/30        Assessment/Plan   Diagnoses and all orders for this visit:    1. Memory loss (Primary)  -     MRI Brain Without Contrast; Future  -     Ambulatory Referral to Neurology  -     CBC & Differential  -     Vitamin B12  -     Comprehensive Metabolic Panel  -     CBC Auto Differential    2. Left arm numbness  -     MRI Brain Without Contrast; Future  -     Ambulatory Referral to Neurology    3. Toenail fungus  -     terbinafine (LamISIL) 250 MG tablet; Take 1 tablet by mouth Daily.  Dispense: 90 tablet; Refill: 0          Rec. ASA 81 mg qd.  Rec CoQ10 qd  Today I have spent a total of 25 minutes face to face with Carleen Payne.  During that time, a total of 15 minutes was spent counseling on memory loss, left arm numbness, toenail fungus.  Discussed medications and side effects.  Compliance and follow-up..

## 2020-12-14 ENCOUNTER — OFFICE VISIT (OUTPATIENT)
Dept: NEUROLOGY | Facility: CLINIC | Age: 70
End: 2020-12-14

## 2020-12-14 VITALS
HEIGHT: 64 IN | WEIGHT: 128 LBS | BODY MASS INDEX: 21.85 KG/M2 | SYSTOLIC BLOOD PRESSURE: 121 MMHG | DIASTOLIC BLOOD PRESSURE: 83 MMHG | HEART RATE: 84 BPM | OXYGEN SATURATION: 97 % | TEMPERATURE: 97.5 F

## 2020-12-14 DIAGNOSIS — G31.84 MILD COGNITIVE IMPAIRMENT: ICD-10-CM

## 2020-12-14 PROCEDURE — 99214 OFFICE O/P EST MOD 30 MIN: CPT | Performed by: NURSE PRACTITIONER

## 2020-12-14 NOTE — ASSESSMENT & PLAN NOTE
Recommend continuing with previously ordered MRI Brain     Labs for other causes     Repeat B12 and folate due to elevated B12 levels    Discussed medication options, I do not feel it is necessary to start her on Aricept at this time. Patient V/U and agrees      F/U in 4 weeks or sooner if needed

## 2020-12-14 NOTE — PROGRESS NOTES
Subjective   Patient ID: Carleen Payne is a 70 y.o. female     Chief Complaint   Patient presents with   • Memory Loss   • Left Arm Numbness        History of Present Illness  70 y.o. female referred by Dr. Guardado for memory loss and left arm numbness.     Patient reports noticing some short term memory loss over the past several months, can't remember family members names (conversationally), and has forgotten where she keeps items in the kitchen or house that haven't moved for years. 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 She was not making mistakes at work, but she found herself double checking her work due to worry of making a mistake.     Denies falls recently, able to perform ADL's, driving on her own, can use a cell phone and remote. Is not getting lost while out in public, denies leaving the stove on, oven on, or candles lit.     MMSE 29, forgot the word Zoe at recall     Having difficulty sleeping, will sleep a couple hours then wake up until 5 am then sleep till 7. Feels exhausted all of the time.     Stressed due to COVID, is visiting her family in [place] intermittently and has been frustrated with having to be in quarantine when she gets back.     Reports pain in the left wrist and numbness through the ring finger. Used to type a lot at her work (she quit 2 weeks ago), denies neck pain or pain in the upper and middle arm.     Medical records reviewed:   12/2/20: concerned about memory loss over the last 1-2 months.  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 Couldn't remember where her baggies are located and they havent moved in 20 years. +stress, sleep is sporadic.   Left arm pain with numbness in arm and fingers. Denies HA/vision change.     MRI ordered, scheduled for 12/17  Labs 12/2/20: CBC, CMP, Vitamin B12 - NCS     Past Medical History:   Diagnosis Date   • Hyperlipidemia  "   • Hypertension    • Osteoporosis      Family History   Problem Relation Age of Onset   • Hypertension Mother    • Hypertension Father    • Stroke Father    • Breast cancer Neg Hx      Social History     Socioeconomic History   • Marital status:      Spouse name: Not on file   • Number of children: Not on file   • Years of education: Not on file   • Highest education level: Not on file   Tobacco Use   • Smoking status: Never Smoker   • Smokeless tobacco: Never Used   Substance and Sexual Activity   • Alcohol use: No   • Drug use: No   • Sexual activity: Yes     Partners: Male       Review of Systems   Constitutional: Negative for activity change, fatigue and unexpected weight change.   HENT: Negative for tinnitus and trouble swallowing.    Eyes: Negative for photophobia and visual disturbance.   Respiratory: Negative for apnea, cough and choking.    Cardiovascular: Negative for leg swelling.   Gastrointestinal: Negative for nausea and vomiting.   Endocrine: Negative for cold intolerance and heat intolerance.   Genitourinary: Negative for difficulty urinating, frequency, menstrual problem and urgency.   Musculoskeletal: Negative for back pain, gait problem, myalgias and neck pain.   Skin: Negative for color change and rash.   Allergic/Immunologic: Negative for immunocompromised state.   Neurological: Positive for numbness. Negative for dizziness, tremors, seizures, syncope, facial asymmetry, speech difficulty, weakness, light-headedness and headaches.   Hematological: Negative for adenopathy. Does not bruise/bleed easily.   Psychiatric/Behavioral: Positive for confusion. Negative for behavioral problems, decreased concentration, hallucinations and sleep disturbance.       Objective     Vitals:    12/14/20 1101   BP: 121/83   Pulse: 84   Temp: 97.5 °F (36.4 °C)   SpO2: 97%   Weight: 58.1 kg (128 lb)   Height: 162.6 cm (64\")       Neurologic Exam     Mental Status   Oriented to person, place, and time. "   Registration: recalls 3 of 3 objects. Recall at 5 minutes: recalls 2 of 3 objects. Follows 3 step commands.   Attention: normal. Concentration: normal.   Speech: speech is normal   Level of consciousness: alert  Knowledge: good and consistent with education.   Able to name object. Able to read. Able to repeat. Able to write. Normal comprehension.     Cranial Nerves     CN II   Visual fields full to confrontation.   Visual acuity: normal  Right visual field deficit: none  Left visual field deficit: none     CN III, IV, VI   Pupils are equal, round, and reactive to light.  Extraocular motions are normal.   Right pupil: Shape: regular. Reactivity: brisk. Consensual response: intact.   Left pupil: Shape: regular. Reactivity: brisk. Consensual response: intact.   Nystagmus: none   Diplopia: none  Ophthalmoparesis: none  Upgaze: normal  Downgaze: normal  Conjugate gaze: present  Vestibulo-ocular reflex: present    CN V   Facial sensation intact.   Right corneal reflex: normal  Left corneal reflex: normal    CN VII   Right facial weakness: none  Left facial weakness: none    CN VIII   Hearing: intact    CN IX, X   Palate: symmetric  Right gag reflex: normal  Left gag reflex: normal    CN XI   Right sternocleidomastoid strength: normal  Left sternocleidomastoid strength: normal    CN XII   Tongue: not atrophic  Fasciculations: absent  Tongue deviation: none    Motor Exam   Muscle bulk: normal  Overall muscle tone: normal  Right arm tone: normal  Left arm tone: normal  Right leg tone: normal  Left leg tone: normal    Strength   Strength 5/5 throughout.     Sensory Exam   Light touch normal.   Vibration normal.   Proprioception normal.   Right arm pinprick: normal  Left arm pinprick: decreased from wrist    Gait, Coordination, and Reflexes     Coordination   Romberg: negative  Finger to nose coordination: normal  Heel to shin coordination: normal  Tandem walking coordination: abnormal    Tremor   Resting tremor:  absent  Intention tremor: absent  Action tremor: absent    Reflexes   Reflexes 2+ except as noted.       Physical Exam  Vitals signs and nursing note reviewed.   Constitutional:       Appearance: Normal appearance.   HENT:      Head: Normocephalic and atraumatic.   Eyes:      Extraocular Movements: Extraocular movements intact and EOM normal.      Pupils: Pupils are equal, round, and reactive to light.      Funduscopic exam:     Right eye: No hemorrhage, exudate or papilledema.         Left eye: No hemorrhage, exudate or papilledema.   Cardiovascular:      Rate and Rhythm: Normal rate.   Pulmonary:      Effort: Pulmonary effort is normal.   Skin:     General: Skin is warm and dry.      Capillary Refill: Capillary refill takes less than 2 seconds.   Neurological:      Mental Status: She is alert and oriented to person, place, and time.      Coordination: Finger-Nose-Finger Test, Heel to Shin Test and Romberg Test normal.      Gait: Tandem walk abnormal.      Deep Tendon Reflexes: Strength normal.   Psychiatric:         Mood and Affect: Mood normal.         Speech: Speech normal.         Behavior: Behavior normal.         Office Visit on 12/02/2020   Component Date Value Ref Range Status   • Vitamin B-12 12/02/2020 2,000* 211 - 946 pg/mL Final   • Glucose 12/02/2020 79  65 - 99 mg/dL Final   • BUN 12/02/2020 17  8 - 23 mg/dL Final   • Creatinine 12/02/2020 0.77  0.57 - 1.00 mg/dL Final   • Sodium 12/02/2020 144  136 - 145 mmol/L Final   • Potassium 12/02/2020 4.1  3.5 - 5.2 mmol/L Final   • Chloride 12/02/2020 102  98 - 107 mmol/L Final   • CO2 12/02/2020 28.6  22.0 - 29.0 mmol/L Final   • Calcium 12/02/2020 10.1  8.6 - 10.5 mg/dL Final   • Total Protein 12/02/2020 7.2  6.0 - 8.5 g/dL Final   • Albumin 12/02/2020 4.50  3.50 - 5.20 g/dL Final   • ALT (SGPT) 12/02/2020 22  1 - 33 U/L Final   • AST (SGOT) 12/02/2020 28  1 - 32 U/L Final   • Alkaline Phosphatase 12/02/2020 87  39 - 117 U/L Final   • Total Bilirubin  12/02/2020 0.4  0.0 - 1.2 mg/dL Final   • eGFR Non African Amer 12/02/2020 75  >60 mL/min/1.73 Final   • Globulin 12/02/2020 2.7  gm/dL Final   • A/G Ratio 12/02/2020 1.7  g/dL Final   • BUN/Creatinine Ratio 12/02/2020 22.1  7.0 - 25.0 Final   • Anion Gap 12/02/2020 13.4  5.0 - 15.0 mmol/L Final   • WBC 12/02/2020 8.24  3.40 - 10.80 10*3/mm3 Final   • RBC 12/02/2020 4.52  3.77 - 5.28 10*6/mm3 Final   • Hemoglobin 12/02/2020 14.5  12.0 - 15.9 g/dL Final   • Hematocrit 12/02/2020 42.6  34.0 - 46.6 % Final   • MCV 12/02/2020 94.2  79.0 - 97.0 fL Final   • MCH 12/02/2020 32.1  26.6 - 33.0 pg Final   • MCHC 12/02/2020 34.0  31.5 - 35.7 g/dL Final   • RDW 12/02/2020 12.4  12.3 - 15.4 % Final   • RDW-SD 12/02/2020 43.2  37.0 - 54.0 fl Final   • MPV 12/02/2020 10.3  6.0 - 12.0 fL Final   • Platelets 12/02/2020 262  140 - 450 10*3/mm3 Final   • Neutrophil % 12/02/2020 70.2  42.7 - 76.0 % Final   • Lymphocyte % 12/02/2020 18.8* 19.6 - 45.3 % Final   • Monocyte % 12/02/2020 8.3  5.0 - 12.0 % Final   • Eosinophil % 12/02/2020 2.1  0.3 - 6.2 % Final   • Basophil % 12/02/2020 0.4  0.0 - 1.5 % Final   • Immature Grans % 12/02/2020 0.2  0.0 - 0.5 % Final   • Neutrophils, Absolute 12/02/2020 5.79  1.70 - 7.00 10*3/mm3 Final   • Lymphocytes, Absolute 12/02/2020 1.55  0.70 - 3.10 10*3/mm3 Final   • Monocytes, Absolute 12/02/2020 0.68  0.10 - 0.90 10*3/mm3 Final   • Eosinophils, Absolute 12/02/2020 0.17  0.00 - 0.40 10*3/mm3 Final   • Basophils, Absolute 12/02/2020 0.03  0.00 - 0.20 10*3/mm3 Final   • Immature Grans, Absolute 12/02/2020 0.02  0.00 - 0.05 10*3/mm3 Final   • nRBC 12/02/2020 0.0  0.0 - 0.2 /100 WBC Final         Assessment/Plan     Problem List Items Addressed This Visit        Other    Mild cognitive impairment    Current Assessment & Plan     Recommend continuing with previously ordered MRI Brain     Labs for other causes     Repeat B12 and folate due to elevated B12 levels    Discussed medication options, I do not feel  it is necessary to start her on Aricept at this time. Patient V/U and agrees      F/U in 4 weeks or sooner if needed          Relevant Orders    Ammonia    RPR    Sedimentation Rate    TSH    Vitamin B12 & Folate             Return in about 4 weeks (around 1/11/2021).

## 2020-12-17 ENCOUNTER — HOSPITAL ENCOUNTER (OUTPATIENT)
Dept: MRI IMAGING | Facility: HOSPITAL | Age: 70
Discharge: HOME | End: 2020-12-17
Admitting: FAMILY MEDICINE

## 2020-12-17 DIAGNOSIS — R20.0 LEFT ARM NUMBNESS: ICD-10-CM

## 2020-12-17 DIAGNOSIS — R41.3 MEMORY LOSS: ICD-10-CM

## 2020-12-17 PROCEDURE — 70551 MRI BRAIN STEM W/O DYE: CPT

## 2021-01-11 ENCOUNTER — LAB (OUTPATIENT)
Dept: LAB | Facility: HOSPITAL | Age: 71
End: 2021-01-11

## 2021-01-11 ENCOUNTER — OFFICE VISIT (OUTPATIENT)
Dept: NEUROLOGY | Facility: CLINIC | Age: 71
End: 2021-01-11

## 2021-01-11 VITALS
HEIGHT: 64 IN | HEART RATE: 79 BPM | DIASTOLIC BLOOD PRESSURE: 80 MMHG | WEIGHT: 128 LBS | OXYGEN SATURATION: 98 % | SYSTOLIC BLOOD PRESSURE: 126 MMHG | BODY MASS INDEX: 21.85 KG/M2

## 2021-01-11 DIAGNOSIS — G31.84 MILD COGNITIVE IMPAIRMENT: ICD-10-CM

## 2021-01-11 LAB — AMMONIA BLD-SCNC: 19 UMOL/L (ref 11–51)

## 2021-01-11 PROCEDURE — 82746 ASSAY OF FOLIC ACID SERUM: CPT

## 2021-01-11 PROCEDURE — 86592 SYPHILIS TEST NON-TREP QUAL: CPT

## 2021-01-11 PROCEDURE — 82607 VITAMIN B-12: CPT

## 2021-01-11 PROCEDURE — 85652 RBC SED RATE AUTOMATED: CPT

## 2021-01-11 PROCEDURE — 99212 OFFICE O/P EST SF 10 MIN: CPT | Performed by: NURSE PRACTITIONER

## 2021-01-11 PROCEDURE — 82140 ASSAY OF AMMONIA: CPT

## 2021-01-11 PROCEDURE — 84443 ASSAY THYROID STIM HORMONE: CPT

## 2021-01-11 PROCEDURE — 36415 COLL VENOUS BLD VENIPUNCTURE: CPT

## 2021-01-11 NOTE — ASSESSMENT & PLAN NOTE
Likely stress related, memory improved once stressors removed     Sleep improved    Will have labs drawn, if normal will F/U on an as needed basis

## 2021-01-11 NOTE — PROGRESS NOTES
Subjective   Patient ID: Carleen Payne is a 70 y.o. female     Chief Complaint   Patient presents with   • Memory Loss        History of Present Illness  70 y.o. female returns in follow up for memory loss and left arm numbness. At last appointment on 12/14/20 ordered labs, continue with MRI order which was previously placed, and F/U for results.     Patient did not have labs drawn on 12/14/20.     MRI Brain 12/17/20, my review of films, minimal mild chronic small vessel disease, otherwise normal.     Short term memory loss has completely resolved since previous appointment. After quitting her job she is now sleeping peacefully and feeling more relaxed. She does not plan to go back to work.     Problem History:    Patient reports noticing some short term memory loss over the past several months, can't remember family members names (conversationally), and has forgotten where she keeps items in the kitchen or house that haven't moved for years. 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 She was not making mistakes at work, but she found herself double checking her work due to worry of making a mistake.     Denies falls recently, able to perform ADL's, driving on her own, can use a cell phone and remote. Is not getting lost while out in public, denies leaving the stove on, oven on, or candles lit.     MMSE 29, forgot the word Zoe at recall     Having difficulty sleeping, will sleep a couple hours then wake up until 5 am then sleep till 7. Feels exhausted all of the time.     Stressed due to COVID, is visiting her family in [place] intermittently and has been frustrated with having to be in quarantine when she gets back.     Reports pain in the left wrist and numbness through the ring finger. Used to type a lot at her work (she quit 2 weeks ago), denies neck pain or pain in the upper and middle arm.     Medical records reviewed:   12/2/20: concerned about memory  loss over the last 1-2 months.  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 Couldn't remember where her baggies are located and they havent moved in 20 years. +stress, sleep is sporadic.   Left arm pain with numbness in arm and fingers. Denies HA/vision change.     MRI ordered, scheduled for 12/17  Labs 12/2/20: CBC, CMP, Vitamin B12 - NCS     Past Medical History:   Diagnosis Date   • Hyperlipidemia    • Hypertension    • Osteoporosis      Family History   Problem Relation Age of Onset   • Hypertension Mother    • Hypertension Father    • Stroke Father    • Breast cancer Neg Hx      Social History     Socioeconomic History   • Marital status:      Spouse name: Not on file   • Number of children: Not on file   • Years of education: Not on file   • Highest education level: Not on file   Tobacco Use   • Smoking status: Never Smoker   • Smokeless tobacco: Never Used   Substance and Sexual Activity   • Alcohol use: No   • Drug use: No   • Sexual activity: Yes     Partners: Male       Review of Systems   Constitutional: Negative for activity change, fatigue and unexpected weight change.   HENT: Negative for tinnitus and trouble swallowing.    Eyes: Negative for photophobia and visual disturbance.   Respiratory: Negative for apnea, cough and choking.    Cardiovascular: Negative for leg swelling.   Gastrointestinal: Negative for nausea and vomiting.   Endocrine: Negative for cold intolerance and heat intolerance.   Genitourinary: Negative for difficulty urinating, frequency, menstrual problem and urgency.   Musculoskeletal: Negative for back pain, gait problem, myalgias and neck pain.   Skin: Negative for color change and rash.   Allergic/Immunologic: Negative for immunocompromised state.   Neurological: Positive for numbness. Negative for dizziness, tremors, seizures, syncope, facial asymmetry, speech difficulty, weakness, light-headedness and  "headaches.   Hematological: Negative for adenopathy. Does not bruise/bleed easily.   Psychiatric/Behavioral: Positive for confusion. Negative for behavioral problems, decreased concentration, hallucinations and sleep disturbance.       Objective     Vitals:    01/11/21 1046   BP: 126/80   Pulse: 79   SpO2: 98%   Weight: 58.1 kg (128 lb)   Height: 162.6 cm (64\")       Neurologic Exam     Mental Status   Attention: normal. Concentration: normal.   Speech: speech is normal   Level of consciousness: alert  Knowledge: good and consistent with education.   Able to name object. Able to read. Able to repeat. Able to write. Normal comprehension.     Cranial Nerves     CN II   Visual fields full to confrontation.   Visual acuity: normal  Right visual field deficit: none  Left visual field deficit: none     CN III, IV, VI   Extraocular motions are normal.   Right pupil: Shape: regular. Reactivity: brisk. Consensual response: intact.   Left pupil: Shape: regular. Reactivity: brisk. Consensual response: intact.   Nystagmus: none   Diplopia: none  Ophthalmoparesis: none  Upgaze: normal  Downgaze: normal  Conjugate gaze: present  Vestibulo-ocular reflex: present    CN V   Facial sensation intact.   Right corneal reflex: normal  Left corneal reflex: normal    CN VII   Right facial weakness: none  Left facial weakness: none    CN VIII   Hearing: intact    CN IX, X   Palate: symmetric  Right gag reflex: normal  Left gag reflex: normal    CN XI   Right sternocleidomastoid strength: normal  Left sternocleidomastoid strength: normal    CN XII   Tongue: not atrophic  Fasciculations: absent  Tongue deviation: none    Motor Exam   Muscle bulk: normal  Overall muscle tone: normal  Right arm tone: normal  Left arm tone: normal  Right leg tone: normal  Left leg tone: normal    Gait, Coordination, and Reflexes     Gait  Gait: normal    Tremor   Resting tremor: absent  Intention tremor: absent  Action tremor: absent    Reflexes   Reflexes 2+ " except as noted.       Physical Exam  Vitals signs and nursing note reviewed.   Constitutional:       Appearance: Normal appearance.   HENT:      Head: Normocephalic and atraumatic.   Eyes:      Extraocular Movements: EOM normal.   Neurological:      Mental Status: She is alert.      Gait: Gait is intact.   Psychiatric:         Speech: Speech normal.         Office Visit on 12/02/2020   Component Date Value Ref Range Status   • Vitamin B-12 12/02/2020 2,000* 211 - 946 pg/mL Final   • Glucose 12/02/2020 79  65 - 99 mg/dL Final   • BUN 12/02/2020 17  8 - 23 mg/dL Final   • Creatinine 12/02/2020 0.77  0.57 - 1.00 mg/dL Final   • Sodium 12/02/2020 144  136 - 145 mmol/L Final   • Potassium 12/02/2020 4.1  3.5 - 5.2 mmol/L Final   • Chloride 12/02/2020 102  98 - 107 mmol/L Final   • CO2 12/02/2020 28.6  22.0 - 29.0 mmol/L Final   • Calcium 12/02/2020 10.1  8.6 - 10.5 mg/dL Final   • Total Protein 12/02/2020 7.2  6.0 - 8.5 g/dL Final   • Albumin 12/02/2020 4.50  3.50 - 5.20 g/dL Final   • ALT (SGPT) 12/02/2020 22  1 - 33 U/L Final   • AST (SGOT) 12/02/2020 28  1 - 32 U/L Final   • Alkaline Phosphatase 12/02/2020 87  39 - 117 U/L Final   • Total Bilirubin 12/02/2020 0.4  0.0 - 1.2 mg/dL Final   • eGFR Non African Amer 12/02/2020 75  >60 mL/min/1.73 Final   • Globulin 12/02/2020 2.7  gm/dL Final   • A/G Ratio 12/02/2020 1.7  g/dL Final   • BUN/Creatinine Ratio 12/02/2020 22.1  7.0 - 25.0 Final   • Anion Gap 12/02/2020 13.4  5.0 - 15.0 mmol/L Final   • WBC 12/02/2020 8.24  3.40 - 10.80 10*3/mm3 Final   • RBC 12/02/2020 4.52  3.77 - 5.28 10*6/mm3 Final   • Hemoglobin 12/02/2020 14.5  12.0 - 15.9 g/dL Final   • Hematocrit 12/02/2020 42.6  34.0 - 46.6 % Final   • MCV 12/02/2020 94.2  79.0 - 97.0 fL Final   • MCH 12/02/2020 32.1  26.6 - 33.0 pg Final   • MCHC 12/02/2020 34.0  31.5 - 35.7 g/dL Final   • RDW 12/02/2020 12.4  12.3 - 15.4 % Final   • RDW-SD 12/02/2020 43.2  37.0 - 54.0 fl Final   • MPV 12/02/2020 10.3  6.0 - 12.0 fL  Final   • Platelets 12/02/2020 262  140 - 450 10*3/mm3 Final   • Neutrophil % 12/02/2020 70.2  42.7 - 76.0 % Final   • Lymphocyte % 12/02/2020 18.8* 19.6 - 45.3 % Final   • Monocyte % 12/02/2020 8.3  5.0 - 12.0 % Final   • Eosinophil % 12/02/2020 2.1  0.3 - 6.2 % Final   • Basophil % 12/02/2020 0.4  0.0 - 1.5 % Final   • Immature Grans % 12/02/2020 0.2  0.0 - 0.5 % Final   • Neutrophils, Absolute 12/02/2020 5.79  1.70 - 7.00 10*3/mm3 Final   • Lymphocytes, Absolute 12/02/2020 1.55  0.70 - 3.10 10*3/mm3 Final   • Monocytes, Absolute 12/02/2020 0.68  0.10 - 0.90 10*3/mm3 Final   • Eosinophils, Absolute 12/02/2020 0.17  0.00 - 0.40 10*3/mm3 Final   • Basophils, Absolute 12/02/2020 0.03  0.00 - 0.20 10*3/mm3 Final   • Immature Grans, Absolute 12/02/2020 0.02  0.00 - 0.05 10*3/mm3 Final   • nRBC 12/02/2020 0.0  0.0 - 0.2 /100 WBC Final         Assessment/Plan     Problem List Items Addressed This Visit        Other    Mild cognitive impairment    Current Assessment & Plan     Likely stress related, memory improved once stressors removed     Sleep improved    Will have labs drawn, if normal will F/U on an as needed basis                          Return if symptoms worsen or fail to improve.

## 2021-01-12 ENCOUNTER — TELEPHONE (OUTPATIENT)
Dept: NEUROLOGY | Facility: CLINIC | Age: 71
End: 2021-01-12

## 2021-01-12 LAB
ERYTHROCYTE [SEDIMENTATION RATE] IN BLOOD: 5 MM/HR (ref 0–30)
FOLATE SERPL-MCNC: >20 NG/ML (ref 4.78–24.2)
RPR SER QL: NORMAL
TSH SERPL DL<=0.05 MIU/L-ACNC: 0.65 UIU/ML (ref 0.27–4.2)
VIT B12 BLD-MCNC: 1864 PG/ML (ref 211–946)

## 2021-01-12 NOTE — TELEPHONE ENCOUNTER
----- Message from RAYMOND Duron sent at 1/12/2021  8:13 AM EST -----  Please let her know that her labs are normal, other than her B12 being elevated. It was 1864.

## 2021-01-12 NOTE — TELEPHONE ENCOUNTER
Relayed results to Carleen who stated understanding. She inquired about why her B12 would be elevated as she has been taking her multi vitamin every other day now and per provider it has gone down since the last drawing so she should continue this. Patient stated appreciation.

## 2021-04-23 DIAGNOSIS — M48.062 LUMBAR STENOSIS WITH NEUROGENIC CLAUDICATION: Primary | ICD-10-CM

## 2021-04-26 DIAGNOSIS — M48.062 LUMBAR STENOSIS WITH NEUROGENIC CLAUDICATION: Primary | ICD-10-CM

## 2021-05-02 NOTE — PROGRESS NOTES
"This consultation was provided with the use of interactive audio through telephone that permits real time communication with the patient, as patient is unable to attend an in-office appointment due to the COVID-19 crisis. Consent for assessment and treatment was provided by the patient.  You have chosen to receive care through a telephone visit today. Do you consent to use a telephone visit for your medical care today?   Yes     Chief Complaint: \"My lower back pain and right leg pain have started to return.\"       History of Present Illness:   Patient: Ms. Carleen Payne, 70 y.o. female originally referred by Dr. Priyanka Guardado in consultation for intractable chronic lower back pain. Patient reports a 16-year history of lower back pain, which began after a fall. I last saw Mrs. Payne through telemedicine on November 2, 2020 for postprocedure follow up and evaluation. On August 17, 2020, she underwent right L4-L5 and right L5-S1 transforaminal epidural steroid injections, from which she experienced 95% pain relief and functional improvement lasting almost seven months. She reports a recurrence of her symptoms over the last month. She did participate in physical therapy as medically advised, and reports at times her pain ceases after physical therapy.  She requests consultation today for follow up and evaluation of her recurrent lower back pain.   Pain description: constant pain with intermittent exacerbation, described as aching and burning sensation.   Radiation of pain: The lower back \"tailbone\" pain radiates into the posterior aspect of the right thigh, right calf to the ankle.    Pain intensity today: 6/10  Average pain intensity last week: 4/10  Pain intensity ranges from: 4/10 to 8/10  Aggravating factors: Pain increases with lying down.  Today she reports standing is significantly better and does not increase her pain significantly.  Alleviating factors: Pain decreases with analgesics, sitting.    Associated " symptoms:   Patient denies numbness or weakness in the lower extremities  Patient denies any new bladder or bowel problems.   Patient denies difficulties with her balance or recent falls    Review of previous therapies and additional medical records:  Carleen Payne has already failed the following measures, including:   Conservative measures: oral analgesics and physical therapy   Interventional measures: 02/26/2020: DxTx right L4-L5 and right L5-S1 TESI  08/17/2020: right L4-L5 and right L5-S1 TESI  former patient of Dr. Talbert. She underwent pidural injections (RT L4-L5 transforaminals x4) with significant relief  Surgical measures: No history of lumbar spine surgery  Carleen Payne underwent neurosurgical consultation with Dr. Gomes on 06/16/2017, and was found not to be a surgical candidate.  Carleen Payne is a healthy adult other than her chronic pain and HTN.  In terms of current analgesics, Carleen Payne takes: aspirin PRN  I have reviewed Anat[facility] Report #[id number] consistent to medication reconciliation.     Global Pain Scale 06-20 2019 02-19 2020 03-23 2020             Pain  1  12  10             Feelings  0  0  0             Clinical outcomes  0  0  2             Activities  0  8  0             GPS Total:  3  20  12                Review of Diagnostic Studies:  XR SPINE LUMBAR FLEX AND EXT- FINDINGS: There is again late grade 1 or early grade 2 anterior subluxation of L4 on L5 similar to the prior study. No more than 2 mm of anterior subluxation is seen from extension to flexion. No significant movement is seen at the remaining levels. Late grade 1 or early grade 2 anterior subluxation of L4 on L5, with minimal change in subluxation from flexion to extension. No  other significant lumbar disease is seen elsewhere.  XR SPINE LUMBAR FLEX AND EXT- 01/25/2017: There is considerable anterolisthesis of L4 forward from L5 with reduction of L4-L5 disc space and there is trace anterolisthesis of L5 forward  from S1. Flexion and extension images reveal no evidence of instability   MRI of the lumbar spine without contrast on November 2, 2016: Radiology report. Mild disc desiccation at all levels. There are sagittal annular fissures at the lower 4 lumbar levels.  Axial imaging:  T12-L1: Small posterior left paracentral disc protrusion. Mild effacement of the ventral leftward thecal sac without significant spinal canal or NF stenosis   L1-L2: negative  L2-L3 and L3-L4: Mild lateral disc bulging.  No canal or NF stenosis  L4-L5: Moderate facet hypertrophy associated with bilateral joint effusions and a grade 1 anterolisthesis of L4 on L5. Mild circumferential disc bulge with mild superior pseudo-protrusion of disc material.  Mild narrowing of the neuroforamina at the thecal sac. There is significant narrowing of the superior right subarticular zone with effacement of the right L5 nerve root.  There is mild to moderate narrowing of the superior left subarticular zone with mild effacement of the left L5 nerve root sheath.  L5-S1: Mild disc space narrowing and mild facet hypertrophy. Mild right and mild to moderate left subarticular zone narrowing just above the level of the pedicles.  Mild effacement of the left S1 nerve root.  There may be slight effacement of the right S1 nerve root.  No narrowing of the thecal sac.  Mild neuroforaminal stenosis    Review of Systems   Allergic/Immunologic: Positive for environmental allergies.   All other systems reviewed and are negative.        Patient Active Problem List   Diagnosis   • Lumbar radiculopathy   • Right sided sciatica   • Leg pain   • Lumbar stenosis with neurogenic claudication   • History of compression fracture of vertebral column   • Osteoporosis   • Spondylolisthesis of lumbar region   • DDD (degenerative disc disease), lumbar   • Mild cognitive impairment       Past Medical History:   Diagnosis Date   • Hyperlipidemia    • Hypertension    • Osteoporosis          Past  Surgical History:   Procedure Laterality Date   • NO PAST SURGERIES           Family History   Problem Relation Age of Onset   • Hypertension Mother    • Hypertension Father    • Stroke Father    • Breast cancer Neg Hx          Social History     Socioeconomic History   • Marital status:      Spouse name: Not on file   • Number of children: Not on file   • Years of education: Not on file   • Highest education level: Not on file   Tobacco Use   • Smoking status: Never Smoker   • Smokeless tobacco: Never Used   Substance and Sexual Activity   • Alcohol use: No   • Drug use: No   • Sexual activity: Yes     Partners: Male           Current Outpatient Medications:   •  atorvastatin (Lipitor) 10 MG tablet, Take 1 tablet by mouth Daily., Disp: 90 tablet, Rfl: 3  •  Calcium 150 MG tablet, Take 150 mg by mouth Daily., Disp: , Rfl:   •  Cholecalciferol (VITAMIN D) 1000 UNITS tablet, Take 1,000 Units by mouth Daily., Disp: , Rfl:   •  loratadine-pseudoephedrine (CLARITIN-D 24-hour)  MG per 24 hr tablet, Take 1 tablet by mouth Daily., Disp: 90 tablet, Rfl: 3  •  losartan (Cozaar) 50 MG tablet, Take 1 tablet by mouth Daily., Disp: 90 tablet, Rfl: 3  •  metoprolol succinate XL (TOPROL-XL) 50 MG 24 hr tablet, Take 1 tablet by mouth Daily., Disp: 90 tablet, Rfl: 3  •  terbinafine (LamISIL) 250 MG tablet, Take 1 tablet by mouth Daily., Disp: 90 tablet, Rfl: 0      Allergies   Allergen Reactions   • Erythromycin Base Nausea And Vomiting   • Gabapentin Swelling     Body swelling         There were no vitals taken for this visit.     Due to the constraints of the telemedicine format, no physical exam was performed      Physical Exam: (Previous PE from last office visit)  Constitutional: Patient is oriented to person, place, and time. Patient appears well-developed and well-nourished.   HEENT: Head: Normocephalic and atraumatic. Eyes: Conjunctivae and lids are normal. Pupils: Equal, round, reactive to light.   Neck: Trachea  normal. Neck supple. No JVD present.   Pulmonary Respiratory effort: No increased work of breathing or signs of respiratory distress. Auscultation of lungs: Clear to auscultation.   Cardiovascular Auscultation of heart: Normal rate and rhythm, normal S1 and S2, no murmurs.   Peripheral vascular exam: Femoral: right 2+, left 2+. Posterior tibialis: right 2+ and left 2+. Dorsalis pedis: right 2+ and left 2+. No edema. Musculoskeletal   Gait and station: Gait evaluation demonstrated a normal gait   Lumbar spine: Passive and active range of motion are full and without pain. Extension, flexion, lateral flexion, rotation of the lumbar spine did not increase or reproduce pain. Lumbar facet joint loading maneuvers are negative.   Rodrigo test and Gaenslen's test are negative   Piriformis maneuvers are negative   Palpation of the bilateral ischial tuberosities, unrevealing   Palpation of the bilateral greater trochanter, unrevealing   Examination of the Iliotibial band: unrevealing   Hip joints: The range of motion of the hip joints is full and without pain   Neurological: Patient is alert and oriented to person, place, and time. Speech: speech is normal. Cortical function: Normal mental status.   Cranial nerves: Cranial nerves 2-12 intact.   Reflex Scores:  Right patellar: 2+  Left patellar: 2+  Right Achilles: 2+  Left Achilles: 2+  Motor strength: 5/5  Motor Tone: normal tone.   Involuntary movements: none.   Superficial/Primitive Reflexes: primitive reflexes were absent.   Right Carcamo: absent  Left Carcamo: absent  Right ankle clonus: absent  Left ankle clonus: absent   Negative long tract signs. Straight leg raising test is negative. Femoral stretch sign is negative.   Sensation: No sensory loss. Sensory exam: intact to light touch, intact pain and temperature sensation, intact vibration sensation and normal proprioception.   Coordination: Normal finger to nose and heel to shin. Normal balance and negative Romberg's  sign   Skin and subcutaneous tissue: Skin is warm and intact. No rash noted. No cyanosis.   Psychiatric: Judgment and insight: Normal. Orientation to person, place and time: Normal. Recent and remote memory: Intact. Mood and affect: Normal.     ASSESSMENT:   1. Lumbar stenosis with neurogenic claudication    2. Spondylolisthesis of lumbar region    3. DDD (degenerative disc disease), lumbar    4. History of compression fracture of vertebral column    5. Osteoporosis, unspecified osteoporosis type, unspecified pathological fracture presence        PLAN/MEDICAL DECISION MAKING: I had a lengthy conversation with Ms. Carleen Payne regarding her chronic pain condition and potential therapeutic options including risks, benefits, alternative therapies, to name a few. Patient presents with a several year history of lower back pain. Some of the pain extends into the posterior aspect of her thigh and right calf and ankle. Studies demonstrated an L4-5 spondylolisthesis, stenosis. She does have a history of osteoporosis.  Patient has failed to obtain pain relief with conservative measures, as referenced above. Patient experiences significant pain relief from interventional pain management measures, as referenced above. I have reviewed all available patient's medical records as well as previous therapies as referenced above. Carleen Payne reports a pain score of 6.  Given her pain assessment as noted, treatment options were discussed and the following options were decided upon as a follow-up plan to address the patient's pain: continuation of current treatment plan for pain, referral to Physical Therapy and steroid injections.  Therefore, I have proposed the following plan:  1. Pharmacological measures: Reviewed. Discussed.   A. Patient takes aspirin PRN  2. Interventional pain management measures: Patient will be scheduled for right L4-L5 and right L5-S1 transforaminal epidural steroid injections. We may repeat epidurals  depending on patient's outcome, or facilitate NS follow-up with Dr Gomes.   3. Long-term rehabilitation efforts:  A. The patient does not have a history of falls. I did complete a risk assessment for falls  B. Patient will start a comprehensive physical therapy program for reconditioning, therapeutic exercise, upper body strengthening/posture correction, core strengthening, gait and balance training, neurodynamics, myofascial release, cupping and dry needling if pain recurs  C. Start an exercise program such as yoga, Pilates, Juan Daniel Chi and water therapy  D. Contrast therapy: Apply ice-packs for 15-20 minutes, followed by heating pads for 15-20 minutes to affected area   4. The patient has been instructed to contact my office with any questions or difficulties. The patient understands the plan and agrees to proceed accordingly.    Patient has been made aware that patient will continue to have access to telephone call, tele-health or e-visit, or in office visit if an emergent or urgent issue arises.     This visit was performed via Telehealth. Patient was advised to call us back or contact a primary care provider, Urgent Care or the Emergency Department if symptoms worsen or treatment provided does not resolve symptoms. Patient verbalizes understanding of medication dosage, comfort measures, instructions for treatment, and follow-up.    This visit has been scheduled as a phone visit to comply with patient safety concerns in accordance with CDC recommendations. Total time of discussion was 7 minutes.         Patient Care Team:  Priyanka Guardado DO as PCP - General (Family Medicine)  Priyanka Guardado DO as Referring Physician (Family Medicine)  Herve Philippe MD as Consulting Physician (Pain Medicine)     No orders of the defined types were placed in this encounter.        Future Appointments   Date Time Provider Department Center   5/4/2021 11:30 AM Flores Stratton APRN [facility]   5/12/2021  9:30 AM Jose Martin  Herve RIZVI MD [facility]   6/7/2021  1:00 PM Priyanka Guardado DO [facility]         RAYMOND Lorenzana/Transcription disclaimer:  Much of this encounter note is an electronic transcription of spoken language to printed text. Electronic transcription of spoken language may permit erroneous, or at times, nonsensical words or phrases to be inadvertently transcribed. Although I have reviewed the note for such errors, some may still exist.

## 2021-05-04 ENCOUNTER — OFFICE VISIT (OUTPATIENT)
Dept: PAIN MEDICINE | Facility: CLINIC | Age: 71
End: 2021-05-04

## 2021-05-04 DIAGNOSIS — Z87.81 HISTORY OF COMPRESSION FRACTURE OF VERTEBRAL COLUMN: ICD-10-CM

## 2021-05-04 DIAGNOSIS — M51.36 DDD (DEGENERATIVE DISC DISEASE), LUMBAR: ICD-10-CM

## 2021-05-04 DIAGNOSIS — M43.16 SPONDYLOLISTHESIS OF LUMBAR REGION: ICD-10-CM

## 2021-05-04 DIAGNOSIS — M48.062 LUMBAR STENOSIS WITH NEUROGENIC CLAUDICATION: ICD-10-CM

## 2021-05-04 DIAGNOSIS — M81.0 OSTEOPOROSIS, UNSPECIFIED OSTEOPOROSIS TYPE, UNSPECIFIED PATHOLOGICAL FRACTURE PRESENCE: ICD-10-CM

## 2021-05-04 PROCEDURE — 99441 PR PHYS/QHP TELEPHONE EVALUATION 5-10 MIN: CPT | Performed by: NURSE PRACTITIONER

## 2021-05-07 ENCOUNTER — HOSPITAL ENCOUNTER (OUTPATIENT)
Dept: CT IMAGING | Facility: HOSPITAL | Age: 71
Discharge: HOME | End: 2021-05-07
Admitting: FAMILY MEDICINE

## 2021-05-07 ENCOUNTER — TELEPHONE (OUTPATIENT)
Dept: FAMILY MEDICINE CLINIC | Facility: CLINIC | Age: 71
End: 2021-05-07

## 2021-05-07 ENCOUNTER — OFFICE VISIT (OUTPATIENT)
Dept: FAMILY MEDICINE CLINIC | Facility: CLINIC | Age: 71
End: 2021-05-07

## 2021-05-07 VITALS
SYSTOLIC BLOOD PRESSURE: 140 MMHG | WEIGHT: 126.2 LBS | BODY MASS INDEX: 23.22 KG/M2 | HEIGHT: 62 IN | RESPIRATION RATE: 16 BRPM | OXYGEN SATURATION: 100 % | DIASTOLIC BLOOD PRESSURE: 88 MMHG | HEART RATE: 76 BPM | TEMPERATURE: 98.4 F

## 2021-05-07 DIAGNOSIS — H05.011 ORBITAL CELLULITIS ON RIGHT: ICD-10-CM

## 2021-05-07 DIAGNOSIS — L03.213 PRESEPTAL CELLULITIS OF RIGHT LOWER EYELID: Primary | ICD-10-CM

## 2021-05-07 DIAGNOSIS — L01.00 IMPETIGO: ICD-10-CM

## 2021-05-07 PROCEDURE — 70450 CT HEAD/BRAIN W/O DYE: CPT

## 2021-05-07 PROCEDURE — 99214 OFFICE O/P EST MOD 30 MIN: CPT | Performed by: FAMILY MEDICINE

## 2021-05-07 RX ORDER — SULFAMETHOXAZOLE AND TRIMETHOPRIM 800; 160 MG/1; MG/1
1 TABLET ORAL 2 TIMES DAILY
Qty: 28 TABLET | Refills: 0 | Status: SHIPPED | OUTPATIENT
Start: 2021-05-07 | End: 2021-05-21

## 2021-05-07 RX ORDER — AMOXICILLIN AND CLAVULANATE POTASSIUM 875; 125 MG/1; MG/1
1 TABLET, FILM COATED ORAL 2 TIMES DAILY
Qty: 28 TABLET | Refills: 0 | Status: SHIPPED | OUTPATIENT
Start: 2021-05-07 | End: 2021-05-21

## 2021-05-07 RX ORDER — BACITRACIN 500 [USP'U]/G
OINTMENT OPHTHALMIC
COMMUNITY
Start: 2021-04-05 | End: 2022-02-08

## 2021-05-07 RX ORDER — MUPIROCIN CALCIUM 20 MG/G
CREAM TOPICAL 3 TIMES DAILY
Qty: 30 G | Refills: 3 | Status: SHIPPED | OUTPATIENT
Start: 2021-05-07 | End: 2022-02-08

## 2021-05-07 NOTE — TELEPHONE ENCOUNTER
Peri from pharmacy called and stated the Bactroban cream that was called in for 30g tube they did not have in stock. They asked if this was okay to change to 22g ointment. Gave the okay to change this per Dr Bueno.

## 2021-05-10 PROBLEM — L03.213 PRESEPTAL CELLULITIS OF RIGHT LOWER EYELID: Status: ACTIVE | Noted: 2021-05-10

## 2021-05-10 PROBLEM — L01.00 IMPETIGO: Status: ACTIVE | Noted: 2021-05-10

## 2021-05-10 NOTE — ASSESSMENT & PLAN NOTE
Cellulitis of the eyelid is concerning for preseptal cellulitis versus orbital cellulitis.  Patient was started on Augmentin and Bactrim for treatment.  CT scan was ordered.  Will review CT scan and if it shows signs of orbital cellulitis will send to hospital for IV antibiotics.  If preseptal cellulitis patient will be treated with above-mentioned antibiotics.  RTC/ED precautions given.

## 2021-05-12 ENCOUNTER — OUTSIDE FACILITY SERVICE (OUTPATIENT)
Dept: PAIN MEDICINE | Facility: CLINIC | Age: 71
End: 2021-05-12

## 2021-05-12 PROCEDURE — 64483 NJX AA&/STRD TFRM EPI L/S 1: CPT | Performed by: ANESTHESIOLOGY

## 2021-05-12 PROCEDURE — 64484 NJX AA&/STRD TFRM EPI L/S EA: CPT | Performed by: ANESTHESIOLOGY

## 2021-05-13 ENCOUNTER — TELEPHONE (OUTPATIENT)
Dept: PAIN MEDICINE | Facility: CLINIC | Age: 71
End: 2021-05-13

## 2021-05-13 NOTE — TELEPHONE ENCOUNTER
Called and spoke with patient whom stated that she's feeling well after her procedure. Advised her follow up appointment of 7/7/21 @ 0900.

## 2021-06-21 DIAGNOSIS — E78.2 MIXED HYPERLIPIDEMIA: ICD-10-CM

## 2021-06-21 DIAGNOSIS — I10 ESSENTIAL HYPERTENSION: ICD-10-CM

## 2021-06-21 RX ORDER — LOSARTAN POTASSIUM 50 MG/1
50 TABLET ORAL DAILY
Qty: 90 TABLET | Refills: 0 | Status: SHIPPED | OUTPATIENT
Start: 2021-06-21 | End: 2021-09-20 | Stop reason: SDUPTHER

## 2021-06-21 RX ORDER — ATORVASTATIN CALCIUM 10 MG/1
10 TABLET, FILM COATED ORAL DAILY
Qty: 90 TABLET | Refills: 0 | Status: SHIPPED | OUTPATIENT
Start: 2021-06-21 | End: 2021-09-20 | Stop reason: SDUPTHER

## 2021-06-24 DIAGNOSIS — I10 ESSENTIAL HYPERTENSION: ICD-10-CM

## 2021-06-24 RX ORDER — METOPROLOL SUCCINATE 50 MG/1
50 TABLET, EXTENDED RELEASE ORAL DAILY
Qty: 90 TABLET | Refills: 3 | Status: SHIPPED | OUTPATIENT
Start: 2021-06-24 | End: 2021-09-20 | Stop reason: SDUPTHER

## 2021-07-01 NOTE — PROGRESS NOTES
"This consultation was provided with the use of interactive audio through telephone that permits real time communication with the patient, as patient is unable to attend an in-office appointment due to the COVID-19 crisis. Consent for assessment and treatment was provided by the patient.  You have chosen to receive care through a telephone visit today. Do you consent to use a telephone visit for your medical care today?   Yes     Chief Complaint: \"I am doing very well.\"       History of Present Illness:   Patient: Ms. Carleen Payne, 70 y.o. female originally referred by Dr. Priyanka Guardado in consultation for intractable chronic lower back pain. Patient reports a 16-year history of lower back pain, which began after a fall.  She was last seen on May 12, 2021, when she underwent repeat right L4-L5 and right L5-S1 transforaminal epidural steroid injection, from which she reports experiencing almost complete pain relief and functional improvement that is ongoing.  Prior to that on August 17, 2020, she underwent right L4-L5 and right L5-S1 transforaminal epidural steroid injections, from which she experienced 95% pain relief and functional improvement lasting almost seven months. She did participate in physical therapy as medically advised, and reports at times her pain ceases after physical therapy.  She requests consultation today for post procedure follow-up and evaluation.   Pain description: intermittent exacerbation (Previously constant), described as aching and burning sensation.   Radiation of pain: Previously the lower back \"tailbone\" pain radiated into the posterior aspect of the right thigh, right calf to the ankle.    Pain intensity today: 1/10  Average pain intensity last week: 2/10  Pain intensity ranges from: 1/10 to 2/10  Aggravating factors: Pain increases with lying down.  Today she reports standing is significantly better and does not increase her pain significantly.  Alleviating factors: Pain decreases " with analgesics, sitting.    Associated symptoms:   Patient denies numbness or weakness in the lower extremities  Patient denies any new bladder or bowel problems.   Patient denies difficulties with her balance or recent falls    Review of previous therapies and additional medical records:  Carleen Payne has already failed the following measures, including:   Conservative measures: oral analgesics and physical therapy   Interventional measures: 02/26/2020: DxTx right L4-L5 and right L5-S1 TESI  08/17/2020: right L4-L5 and right L5-S1 TESI  05/12/2021: right L4-L5 and right L5-S1 TESI  former patient of Dr. Talbert. She underwent pidural injections (RT L4-L5 transforaminals x4) with significant relief  Surgical measures: No history of lumbar spine surgery  Carleen Payne underwent neurosurgical consultation with Dr. Gomes on 06/16/2017, and was found not to be a surgical candidate.  Carleen Payne is a healthy adult other than her chronic pain and HTN.  In terms of current analgesics, Carleen Payne takes: aspirin PRN  I have reviewed Anatoly Report #[id number] consistent to medication reconciliation.     Global Pain Scale 06-20 2019 02-19 2020 03-23 2020             Pain  1  12  10             Feelings  0  0  0             Clinical outcomes  0  0  2             Activities  0  8  0             GPS Total:  3  20  12                Review of Diagnostic Studies:  XR SPINE LUMBAR FLEX AND EXT- FINDINGS: There is again late grade 1 or early grade 2 anterior subluxation of L4 on L5 similar to the prior study. No more than 2 mm of anterior subluxation is seen from extension to flexion. No significant movement is seen at the remaining levels. Late grade 1 or early grade 2 anterior subluxation of L4 on L5, with minimal change in subluxation from flexion to extension. No  other significant lumbar disease is seen elsewhere.  XR SPINE LUMBAR FLEX AND EXT- 01/25/2017: There is considerable anterolisthesis of L4 forward from L5  with reduction of L4-L5 disc space and there is trace anterolisthesis of L5 forward from S1. Flexion and extension images reveal no evidence of instability   MRI of the lumbar spine without contrast on November 2, 2016: Radiology report. Mild disc desiccation at all levels. There are sagittal annular fissures at the lower 4 lumbar levels.  Axial imaging:  T12-L1: Small posterior left paracentral disc protrusion. Mild effacement of the ventral leftward thecal sac without significant spinal canal or NF stenosis   L1-L2: negative  L2-L3 and L3-L4: Mild lateral disc bulging.  No canal or NF stenosis  L4-L5: Moderate facet hypertrophy associated with bilateral joint effusions and a grade 1 anterolisthesis of L4 on L5. Mild circumferential disc bulge with mild superior pseudo-protrusion of disc material.  Mild narrowing of the neuroforamina at the thecal sac. There is significant narrowing of the superior right subarticular zone with effacement of the right L5 nerve root.  There is mild to moderate narrowing of the superior left subarticular zone with mild effacement of the left L5 nerve root sheath.  L5-S1: Mild disc space narrowing and mild facet hypertrophy. Mild right and mild to moderate left subarticular zone narrowing just above the level of the pedicles.  Mild effacement of the left S1 nerve root.  There may be slight effacement of the right S1 nerve root.  No narrowing of the thecal sac.  Mild neuroforaminal stenosis    Review of Systems   Allergic/Immunologic: Positive for environmental allergies.   All other systems reviewed and are negative.        Patient Active Problem List   Diagnosis   • Lumbar radiculopathy   • Right sided sciatica   • Leg pain   • Lumbar stenosis with neurogenic claudication   • History of compression fracture of vertebral column   • Osteoporosis   • Spondylolisthesis of lumbar region   • DDD (degenerative disc disease), lumbar   • Mild cognitive impairment   • Impetigo   • Preseptal  cellulitis of right lower eyelid       Past Medical History:   Diagnosis Date   • Hyperlipidemia    • Hypertension    • Osteoporosis          Past Surgical History:   Procedure Laterality Date   • NO PAST SURGERIES           Family History   Problem Relation Age of Onset   • Hypertension Mother    • Hypertension Father    • Stroke Father    • Heart attack Brother    • Thyroid disease Daughter    • Stroke Maternal Grandmother    • Cancer Maternal Grandfather    • Cancer Paternal Grandmother    • Arthritis Brother    • Breast cancer Neg Hx          Social History     Socioeconomic History   • Marital status:      Spouse name: Not on file   • Number of children: Not on file   • Years of education: Not on file   • Highest education level: Not on file   Tobacco Use   • Smoking status: Never Smoker   • Smokeless tobacco: Never Used   Substance and Sexual Activity   • Alcohol use: No   • Drug use: No   • Sexual activity: Yes     Partners: Male           Current Outpatient Medications:   •  atorvastatin (Lipitor) 10 MG tablet, Take 1 tablet by mouth Daily., Disp: 90 tablet, Rfl: 0  •  bacitracin 500 UNIT/GM ophthalmic ointment, APPLY OINTMENT INTO LEFT EYE EVERY 4 HOURS FOR 10 DAYS, Disp: , Rfl:   •  Calcium 150 MG tablet, Take 150 mg by mouth Daily., Disp: , Rfl:   •  Cholecalciferol (VITAMIN D) 1000 UNITS tablet, Take 1,000 Units by mouth Daily., Disp: , Rfl:   •  loratadine-pseudoephedrine (CLARITIN-D 24-hour)  MG per 24 hr tablet, Take 1 tablet by mouth Daily., Disp: 90 tablet, Rfl: 3  •  losartan (Cozaar) 50 MG tablet, Take 1 tablet by mouth Daily., Disp: 90 tablet, Rfl: 0  •  metoprolol succinate XL (TOPROL-XL) 50 MG 24 hr tablet, Take 1 tablet by mouth Daily., Disp: 90 tablet, Rfl: 3  •  mupirocin (Bactroban) 2 % cream, Apply  topically to the appropriate area as directed 3 (Three) Times a Day., Disp: 30 g, Rfl: 3  •  terbinafine (LamISIL) 250 MG tablet, Take 1 tablet by mouth Daily., Disp: 90 tablet,  Rfl: 0      Allergies   Allergen Reactions   • Erythromycin Base Nausea And Vomiting   • Gabapentin Swelling     Body swelling         There were no vitals taken for this visit.     Due to the constraints of the telemedicine format, no physical exam was performed      Physical Exam: (Previous PE from last office visit)  Constitutional: Patient is oriented to person, place, and time. Patient appears well-developed and well-nourished.   HEENT: Head: Normocephalic and atraumatic. Eyes: Conjunctivae and lids are normal. Pupils: Equal, round, reactive to light.   Neck: Trachea normal. Neck supple. No JVD present.   Pulmonary Respiratory effort: No increased work of breathing or signs of respiratory distress. Auscultation of lungs: Clear to auscultation.   Cardiovascular Auscultation of heart: Normal rate and rhythm, normal S1 and S2, no murmurs.   Peripheral vascular exam: Femoral: right 2+, left 2+. Posterior tibialis: right 2+ and left 2+. Dorsalis pedis: right 2+ and left 2+. No edema. Musculoskeletal   Gait and station: Gait evaluation demonstrated a normal gait   Lumbar spine: Passive and active range of motion are full and without pain. Extension, flexion, lateral flexion, rotation of the lumbar spine did not increase or reproduce pain. Lumbar facet joint loading maneuvers are negative.   Rodrigo test and Gaenslen's test are negative   Piriformis maneuvers are negative   Palpation of the bilateral ischial tuberosities, unrevealing   Palpation of the bilateral greater trochanter, unrevealing   Examination of the Iliotibial band: unrevealing   Hip joints: The range of motion of the hip joints is full and without pain   Neurological: Patient is alert and oriented to person, place, and time. Speech: speech is normal. Cortical function: Normal mental status.   Cranial nerves: Cranial nerves 2-12 intact.   Reflex Scores:  Right patellar: 2+  Left patellar: 2+  Right Achilles: 2+  Left Achilles: 2+  Motor strength:  5/5  Motor Tone: normal tone.   Involuntary movements: none.   Superficial/Primitive Reflexes: primitive reflexes were absent.   Right Carcamo: absent  Left Carcamo: absent  Right ankle clonus: absent  Left ankle clonus: absent   Negative long tract signs. Straight leg raising test is negative. Femoral stretch sign is negative.   Sensation: No sensory loss. Sensory exam: intact to light touch, intact pain and temperature sensation, intact vibration sensation and normal proprioception.   Coordination: Normal finger to nose and heel to shin. Normal balance and negative Romberg's sign   Skin and subcutaneous tissue: Skin is warm and intact. No rash noted. No cyanosis.   Psychiatric: Judgment and insight: Normal. Orientation to person, place and time: Normal. Recent and remote memory: Intact. Mood and affect: Normal.     ASSESSMENT:   1. Lumbar stenosis with neurogenic claudication    2. Spondylolisthesis of lumbar region    3. DDD (degenerative disc disease), lumbar    4. History of compression fracture of vertebral column    5. Osteoporosis, unspecified osteoporosis type, unspecified pathological fracture presence        PLAN/MEDICAL DECISION MAKING: I had a lengthy conversation with Ms. Carleen Lenore Payne regarding her chronic pain condition and potential therapeutic options including risks, benefits, alternative therapies, to name a few. Patient presents with a several year history of lower back pain. Some of the pain extends into the posterior aspect of her thigh and right calf and ankle. Studies demonstrated an L4-5 spondylolisthesis, stenosis. She does have a history of osteoporosis.  Patient has failed to obtain pain relief with conservative measures, as referenced above. Patient experiences significant pain relief from interventional pain management measures, as referenced above. I have reviewed all available patient's medical records as well as previous therapies as referenced above.   Therefore, I have proposed  the following plan:  1. Pharmacological measures: Reviewed. Discussed.   A. Patient takes aspirin PRN  2. Interventional pain management measures: None indicated at this time. Follow up on an as needed basis. If pain recurs we may repeat right L4-L5 and right L5-S1 transforaminal epidural steroid injections. We may repeat epidurals depending on patient's outcome, or facilitate NS follow-up with Dr Gomes.   3. Long-term rehabilitation efforts:  A. The patient does not have a history of falls. I did complete a risk assessment for falls  B. Patient will start a comprehensive physical therapy program for reconditioning, therapeutic exercise, upper body strengthening/posture correction, core strengthening, gait and balance training, neurodynamics, myofascial release, cupping and dry needling if pain recurs  C. Start an exercise program such as yoga, Pilates, Juan Daniel Chi and water therapy  D. Contrast therapy: Apply ice-packs for 15-20 minutes, followed by heating pads for 15-20 minutes to affected area   4. The patient has been instructed to contact my office with any questions or difficulties. The patient understands the plan and agrees to proceed accordingly.    Patient has been made aware that patient will continue to have access to telephone call, tele-health or e-visit, or in office visit if an emergent or urgent issue arises.     This visit was performed via Telehealth. Patient was advised to call us back or contact a primary care provider, Urgent Care or the Emergency Department if symptoms worsen or treatment provided does not resolve symptoms. Patient verbalizes understanding of medication dosage, comfort measures, instructions for treatment, and follow-up.    This visit has been scheduled as a phone visit to comply with patient safety concerns in accordance with CDC recommendations. Total time of discussion was 7 minutes.         Patient Care Team:  Priyanka Guardado DO as PCP - General (Family Medicine)  Debby  Priyanka RIZVI DO as Referring Physician (Family Medicine)  Herve Philippe MD as Consulting Physician (Pain Medicine)     No orders of the defined types were placed in this encounter.        Future Appointments   Date Time Provider Department Center   7/7/2021  9:00 AM Flores Stratton APRN [facility]   7/28/2021  1:45 PM Priyanka Guardado DO [facility]         RAYMOND Lorenzana     EMR Dragon/Transcription disclaimer:  Much of this encounter note is an electronic transcription of spoken language to printed text. Electronic transcription of spoken language may permit erroneous, or at times, nonsensical words or phrases to be inadvertently transcribed. Although I have reviewed the note for such errors, some may still exist.

## 2021-07-07 ENCOUNTER — OFFICE VISIT (OUTPATIENT)
Dept: PAIN MEDICINE | Facility: CLINIC | Age: 71
End: 2021-07-07

## 2021-07-07 DIAGNOSIS — Z87.81 HISTORY OF COMPRESSION FRACTURE OF VERTEBRAL COLUMN: ICD-10-CM

## 2021-07-07 DIAGNOSIS — M43.16 SPONDYLOLISTHESIS OF LUMBAR REGION: ICD-10-CM

## 2021-07-07 DIAGNOSIS — M48.062 LUMBAR STENOSIS WITH NEUROGENIC CLAUDICATION: ICD-10-CM

## 2021-07-07 DIAGNOSIS — M51.36 DDD (DEGENERATIVE DISC DISEASE), LUMBAR: ICD-10-CM

## 2021-07-07 DIAGNOSIS — M81.0 OSTEOPOROSIS, UNSPECIFIED OSTEOPOROSIS TYPE, UNSPECIFIED PATHOLOGICAL FRACTURE PRESENCE: ICD-10-CM

## 2021-07-07 PROCEDURE — 99441 PR PHYS/QHP TELEPHONE EVALUATION 5-10 MIN: CPT | Performed by: NURSE PRACTITIONER

## 2021-08-11 ENCOUNTER — OFFICE VISIT (OUTPATIENT)
Dept: FAMILY MEDICINE CLINIC | Facility: CLINIC | Age: 71
End: 2021-08-11

## 2021-08-11 VITALS
HEIGHT: 62 IN | WEIGHT: 127.4 LBS | BODY MASS INDEX: 23.45 KG/M2 | TEMPERATURE: 98.1 F | RESPIRATION RATE: 16 BRPM | DIASTOLIC BLOOD PRESSURE: 82 MMHG | SYSTOLIC BLOOD PRESSURE: 122 MMHG | OXYGEN SATURATION: 99 % | HEART RATE: 78 BPM

## 2021-08-11 DIAGNOSIS — L30.9 DERMATITIS: ICD-10-CM

## 2021-08-11 DIAGNOSIS — H02.846 EDEMA OF LEFT EYELID: ICD-10-CM

## 2021-08-11 PROCEDURE — 99213 OFFICE O/P EST LOW 20 MIN: CPT | Performed by: NURSE PRACTITIONER

## 2021-08-11 PROCEDURE — 96372 THER/PROPH/DIAG INJ SC/IM: CPT | Performed by: NURSE PRACTITIONER

## 2021-08-11 RX ORDER — DEXAMETHASONE SODIUM PHOSPHATE 10 MG/ML
10 INJECTION INTRAMUSCULAR; INTRAVENOUS ONCE
Status: COMPLETED | OUTPATIENT
Start: 2021-08-11 | End: 2021-08-11

## 2021-08-11 RX ADMIN — DEXAMETHASONE SODIUM PHOSPHATE 10 MG: 10 INJECTION INTRAMUSCULAR; INTRAVENOUS at 09:02

## 2021-08-11 NOTE — ASSESSMENT & PLAN NOTE
Benadryl at bedtime  Get adequate rest  Reviewed recent CT head results with patient indication no post septal edema or any significant findings  RTO or call PRN if worsening or persistent symptoms

## 2021-08-11 NOTE — PATIENT INSTRUCTIONS
???????  Atopic Dermatitis  Atopic dermatitis is a skin disorder that causes inflammation of the skin. This is the most common type of eczema. Eczema is a group of skin conditions that cause the skin to be itchy, red, and swollen. This condition is generally worse during the cooler winter months and often improves during the warm summer months. Symptoms can vary from person to person.  Atopic dermatitis usually starts showing signs in infancy and can last through adulthood. This condition cannot be passed from one person to another (non-contagious), but it is more common in families. Atopic dermatitis may not always be present. When it is present, it is called a flare-up.  What are the causes?  The exact cause of this condition is not known. Flare-ups of the condition may be triggered by:  · Contact with something that you are sensitive or allergic to.  · Stress.  · Certain foods.  · Extremely hot or cold weather.  · Harsh chemicals and soaps.  · Dry air.  · Chlorine.  What increases the risk?  This condition is more likely to develop in people who have a personal history or family history of eczema, allergies, asthma, or hay fever.  What are the signs or symptoms?  Symptoms of this condition include:  · Dry, scaly skin.  · Red, itchy rash.  · Itchiness, which can be severe. This may occur before the skin rash. This can make sleeping difficult.  · Skin thickening and cracking that can occur over time.  How is this diagnosed?  This condition is diagnosed based on your symptoms, a medical history, and a physical exam.  How is this treated?  There is no cure for this condition, but symptoms can usually be controlled. Treatment focuses on:  · Controlling the itchiness and scratching. You may be given medicines, such as antihistamines or steroid creams.  · Limiting exposure to things that you are sensitive or allergic to (allergens).  · Recognizing situations that cause stress and developing a plan to manage stress.  If  your atopic dermatitis does not get better with medicines, or if it is all over your body (widespread), a treatment using a specific type of light (phototherapy) may be used.  Follow these instructions at home:  Skin care    · Keep your skin well-moisturized. Doing this seals in moisture and helps to prevent dryness.  ? Use unscented lotions that have petroleum in them.  ? Avoid lotions that contain alcohol or water. They can dry the skin.  · Keep baths or showers short (less than 5 minutes) in warm water. Do not use hot water.  ? Use mild, unscented cleansers for bathing. Avoid soap and bubble bath.  ? Apply a moisturizer to your skin right after a bath or shower.  · Do not apply anything to your skin without checking with your health care provider.  General instructions  · Dress in clothes made of cotton or cotton blends. Dress lightly because heat increases itchiness.  · When washing your clothes, rinse your clothes twice so all of the soap is removed.  · Avoid any triggers that can cause a flare-up.  · Try to manage your stress.  · Keep your fingernails cut short.  · Avoid scratching. Scratching makes the rash and itchiness worse. It may also result in a skin infection (impetigo) due to a break in the skin caused by scratching.  · Take or apply over-the-counter and prescription medicines only as told by your health care provider.  · Keep all follow-up visits as told by your health care provider. This is important.  · Do not be around people who have cold sores or fever blisters. If you get the infection, it may cause your atopic dermatitis to worsen.  Contact a health care provider if:  · Your itchiness interferes with sleep.  · Your rash gets worse or it is not better within one week of starting treatment.  · You have a fever.  · You have a rash flare-up after having contact with someone who has cold sores or fever blisters.  Get help right away if:  · You develop pus or soft yellow scabs in the rash  area.  Summary  · This condition causes a red rash and itchy, dry, scaly skin.  · Treatment focuses on controlling the itchiness and scratching, limiting exposure to things that you are sensitive or allergic to (allergens), recognizing situations that cause stress, and developing a plan to manage stress.  · Keep your skin well-moisturized.  · Keep baths or showers shorter than 5 minutes and use warm water. Do not use hot water.  This information is not intended to replace advice given to you by your health care provider. Make sure you discuss any questions you have with your health care provider.  Document Revised: 04/07/2020 Document Reviewed: 01/19/2018  Elsevier Patient Education © 2021 [employer]

## 2021-08-11 NOTE — ASSESSMENT & PLAN NOTE
Steroid injection in office and steroid cream to use BID sent to pharmacy  Do not scratch areas  Benadryl at bedtime  Referral sent to dermatology/recurrent rash  RTO or call PRN persistent or worsening symptoms  F/U with PCP as scheduled for upcoming AWV

## 2021-08-11 NOTE — PROGRESS NOTES
"Chief Complaint  Rash (Neck and upper left arm.) and Facial Swelling    Subjective          Carleen Payne presents to [facility]  Rash  This is a recurrent problem. The current episode started in the past 7 days. The problem has been gradually worsening since onset. The affected locations include the neck and left arm. The rash is characterized by itchiness and redness. It is unknown if there was an exposure to a precipitant. Associated symptoms include facial edema. Pertinent negatives include no congestion, cough, eye pain, fever, joint pain, rhinorrhea, shortness of breath or sore throat. (Left eyelid edema, minimal) Past treatments include antibiotic cream and antihistamine (Takes daily Claritin). The treatment provided no relief. Her past medical history is significant for allergies.   Facial Swelling  Associated symptoms include a rash. Pertinent negatives include no congestion, coughing, fever or sore throat.       Objective   Vital Signs:   /82   Pulse 78   Temp 98.1 °F (36.7 °C) (Infrared)   Resp 16   Ht 156.2 cm (61.5\")   Wt 57.8 kg (127 lb 6.4 oz)   SpO2 99%   BMI 23.68 kg/m²     Physical Exam  Vitals and nursing note reviewed.   Constitutional:       Appearance: Normal appearance.   HENT:      Head: Normocephalic and atraumatic.      Right Ear: Tympanic membrane and ear canal normal.      Left Ear: Tympanic membrane and ear canal normal.      Nose: Nose normal.      Mouth/Throat:      Mouth: Mucous membranes are moist.      Pharynx: Oropharynx is clear.   Eyes:      Extraocular Movements: Extraocular movements intact.      Pupils: Pupils are equal, round, and reactive to light.     Cardiovascular:      Rate and Rhythm: Normal rate and regular rhythm.      Pulses: Normal pulses.      Heart sounds: Normal heart sounds. No murmur heard.     Pulmonary:      Effort: Pulmonary effort is normal.      Breath sounds: Normal breath sounds.   Abdominal:      " General: Abdomen is flat. Bowel sounds are normal.      Palpations: Abdomen is soft.   Musculoskeletal:         General: Normal range of motion.      Cervical back: Normal range of motion and neck supple. No tenderness.   Skin:     General: Skin is warm and dry.      Findings: Rash present. No abscess, bruising, signs of injury or laceration. Rash is not pustular.          Neurological:      General: No focal deficit present.      Mental Status: She is alert and oriented to person, place, and time.   Psychiatric:         Mood and Affect: Mood normal.         Behavior: Behavior normal.         Thought Content: Thought content normal.         Judgment: Judgment normal.          Data reviewed: Radiologic studies CT scan head with no significant findings          Assessment and Plan    Diagnoses and all orders for this visit:    1. Dermatitis  Assessment & Plan:  Steroid injection in office and steroid cream to use BID sent to pharmacy  Do not scratch areas  Benadryl at bedtime  Referral sent to dermatology/recurrent rash  RTO or call PRN persistent or worsening symptoms  F/U with PCP as scheduled for upcoming AWV    Orders:  -     dexamethasone (DECADRON) injection 10 mg  -     triamcinolone (KENALOG) 0.1 % ointment; Apply  topically to the appropriate area as directed 2 (Two) Times a Day for 7 days.  Dispense: 15 g; Refill: 0  -     Ambulatory Referral to Dermatology    2. Edema of left eyelid  Assessment & Plan:  Benadryl at bedtime  Get adequate rest  Reviewed recent CT head results with patient indication no post septal edema or any significant findings  RTO or call PRN if worsening or persistent symptoms    Orders:  -     dexamethasone (DECADRON) injection 10 mg      Follow Up   Return if symptoms worsen or fail to improve, for as scheduled upcoming AWV with Dr. Guardado.  Patient was given instructions and counseling regarding her condition or for health maintenance advice. Please see specific information pulled  into the AVS if appropriate.

## 2021-09-02 ENCOUNTER — PATIENT MESSAGE (OUTPATIENT)
Dept: PAIN MEDICINE | Facility: CLINIC | Age: 71
End: 2021-09-02

## 2021-09-02 DIAGNOSIS — M48.062 LUMBAR STENOSIS WITH NEUROGENIC CLAUDICATION: Primary | ICD-10-CM

## 2021-09-17 DIAGNOSIS — M48.062 LUMBAR STENOSIS WITH NEUROGENIC CLAUDICATION: Primary | ICD-10-CM

## 2021-09-20 ENCOUNTER — OFFICE VISIT (OUTPATIENT)
Dept: FAMILY MEDICINE CLINIC | Facility: CLINIC | Age: 71
End: 2021-09-20

## 2021-09-20 VITALS
HEART RATE: 74 BPM | WEIGHT: 125 LBS | DIASTOLIC BLOOD PRESSURE: 80 MMHG | HEIGHT: 62 IN | BODY MASS INDEX: 23 KG/M2 | OXYGEN SATURATION: 98 % | SYSTOLIC BLOOD PRESSURE: 130 MMHG | RESPIRATION RATE: 20 BRPM | TEMPERATURE: 97 F

## 2021-09-20 DIAGNOSIS — Z12.31 ENCOUNTER FOR SCREENING MAMMOGRAM FOR BREAST CANCER: ICD-10-CM

## 2021-09-20 DIAGNOSIS — Z00.00 MEDICARE ANNUAL WELLNESS VISIT, SUBSEQUENT: Primary | ICD-10-CM

## 2021-09-20 DIAGNOSIS — E78.2 MIXED HYPERLIPIDEMIA: ICD-10-CM

## 2021-09-20 DIAGNOSIS — I10 ESSENTIAL HYPERTENSION: ICD-10-CM

## 2021-09-20 PROCEDURE — 85025 COMPLETE CBC W/AUTO DIFF WBC: CPT | Performed by: FAMILY MEDICINE

## 2021-09-20 PROCEDURE — G0439 PPPS, SUBSEQ VISIT: HCPCS | Performed by: FAMILY MEDICINE

## 2021-09-20 PROCEDURE — 84443 ASSAY THYROID STIM HORMONE: CPT | Performed by: FAMILY MEDICINE

## 2021-09-20 PROCEDURE — 80061 LIPID PANEL: CPT | Performed by: FAMILY MEDICINE

## 2021-09-20 PROCEDURE — 80053 COMPREHEN METABOLIC PANEL: CPT | Performed by: FAMILY MEDICINE

## 2021-09-20 PROCEDURE — 36415 COLL VENOUS BLD VENIPUNCTURE: CPT | Performed by: FAMILY MEDICINE

## 2021-09-20 RX ORDER — METOPROLOL SUCCINATE 50 MG/1
50 TABLET, EXTENDED RELEASE ORAL DAILY
Qty: 90 TABLET | Refills: 3 | Status: SHIPPED | OUTPATIENT
Start: 2021-09-20 | End: 2022-01-03 | Stop reason: SDUPTHER

## 2021-09-20 RX ORDER — FAMOTIDINE 40 MG/1
40 TABLET, FILM COATED ORAL DAILY
COMMUNITY
Start: 2021-09-17 | End: 2021-09-20 | Stop reason: SDUPTHER

## 2021-09-20 RX ORDER — FEXOFENADINE HCL 180 MG/1
180 TABLET ORAL 2 TIMES DAILY
COMMUNITY
Start: 2021-09-17 | End: 2022-02-08 | Stop reason: SDUPTHER

## 2021-09-20 RX ORDER — LOSARTAN POTASSIUM 50 MG/1
50 TABLET ORAL DAILY
Qty: 90 TABLET | Refills: 1 | Status: SHIPPED | OUTPATIENT
Start: 2021-09-20 | End: 2022-01-03 | Stop reason: SDUPTHER

## 2021-09-20 RX ORDER — FAMOTIDINE 40 MG/1
40 TABLET, FILM COATED ORAL DAILY
Qty: 90 TABLET | Refills: 1 | Status: SHIPPED | OUTPATIENT
Start: 2021-09-20 | End: 2022-02-08

## 2021-09-20 RX ORDER — EPINEPHRINE 0.3 MG/.3ML
INJECTION SUBCUTANEOUS
COMMUNITY
Start: 2021-09-17

## 2021-09-20 RX ORDER — ATORVASTATIN CALCIUM 10 MG/1
10 TABLET, FILM COATED ORAL DAILY
Qty: 90 TABLET | Refills: 1 | Status: SHIPPED | OUTPATIENT
Start: 2021-09-20 | End: 2022-01-03 | Stop reason: SDUPTHER

## 2021-09-20 NOTE — PROGRESS NOTES
The ABCs of the Annual Wellness Visit  Subsequent Medicare Wellness Visit    Chief Complaint   Patient presents with   • Medicare Wellness-subsequent      Subjective    History of Present Illness:  Carleen Payne is a 71 y.o. female who presents for a Subsequent Medicare Wellness Visit.  Has had recent episodes of periorbital cellulitis. Has seen allergist. On Allegra and Famotidine.    Concerned about memory.  The following portions of the patient's history were reviewed and   updated as appropriate: allergies, current medications, past family history, past medical history, past social history, past surgical history and problem list.    Compared to one year ago, the patient feels her physical   health is the same.    Compared to one year ago, the patient feels her mental   health is the same.    Recent Hospitalizations:  She was not admitted to the hospital during the last year.       Current Medical Providers:  Patient Care Team:  Priyanka Guardado DO as PCP - General (Family Medicine)  Priyanka Guardado DO as Referring Physician (Family Medicine)  Herve Philippe MD as Consulting Physician (Pain Medicine)    Outpatient Medications Prior to Visit   Medication Sig Dispense Refill   • bacitracin 500 UNIT/GM ophthalmic ointment APPLY OINTMENT INTO LEFT EYE EVERY 4 HOURS FOR 10 DAYS     • Calcium 150 MG tablet Take 150 mg by mouth Daily.     • Cholecalciferol (VITAMIN D) 1000 UNITS tablet Take 1,000 Units by mouth Daily.     • EPINEPHrine (EPIPEN) 0.3 MG/0.3ML solution auto-injector injection INJECT INTRAMUSCULARLY AS NEEDED FOR ALLERGIC REACTION     • fexofenadine (ALLEGRA) 180 MG tablet Take 180 mg by mouth 2 (Two) Times a Day.     • loratadine-pseudoephedrine (CLARITIN-D 24-hour)  MG per 24 hr tablet Take 1 tablet by mouth Daily. 90 tablet 3   • mupirocin (Bactroban) 2 % cream Apply  topically to the appropriate area as directed 3 (Three) Times a Day. 30 g 3   • terbinafine (LamISIL) 250 MG tablet Take 1  tablet by mouth Daily. 90 tablet 0   • atorvastatin (Lipitor) 10 MG tablet Take 1 tablet by mouth Daily. 90 tablet 0   • famotidine (PEPCID) 40 MG tablet Take 40 mg by mouth Daily.     • losartan (Cozaar) 50 MG tablet Take 1 tablet by mouth Daily. 90 tablet 0   • metoprolol succinate XL (TOPROL-XL) 50 MG 24 hr tablet Take 1 tablet by mouth Daily. 90 tablet 3     No facility-administered medications prior to visit.       No opioid medication identified on active medication list. I have reviewed chart for other potential  high risk medication/s and harmful drug interactions in the elderly.          Aspirin is not on active medication list.  Aspirin use is not indicated based on review of current medical condition/s. Risk of harm outweighs potential benefits.  .    Patient Active Problem List   Diagnosis   • Lumbar radiculopathy   • Right sided sciatica   • Leg pain   • Lumbar stenosis with neurogenic claudication   • History of compression fracture of vertebral column   • Osteoporosis   • Spondylolisthesis of lumbar region   • DDD (degenerative disc disease), lumbar   • Mild cognitive impairment   • Impetigo   • Preseptal cellulitis of right lower eyelid   • Dermatitis   • Edema of left eyelid     Advance Care Planning  Advance Directive is not on file.  ACP discussion was held with the patient during this visit. Patient does not have an advance directive, information provided.    Review of Systems   Constitutional: Negative.    HENT: Negative.    Eyes: Negative.    Respiratory: Negative.  Negative for chest tightness, shortness of breath and wheezing.    Cardiovascular: Negative.  Negative for chest pain, palpitations and leg swelling.   Gastrointestinal: Negative.    Endocrine: Negative.    Genitourinary: Negative.    Musculoskeletal: Negative.    Skin: Positive for rash.   Allergic/Immunologic: Negative.    Neurological: Negative.    Hematological: Negative.    Psychiatric/Behavioral: Negative.         Objective   "  Vitals:    09/20/21 1343   BP: 130/80   Pulse: 74   Resp: 20   Temp: 97 °F (36.1 °C)   SpO2: 98%   Weight: 56.7 kg (125 lb)   Height: 156.2 cm (61.5\")   PainSc: 0-No pain     BMI Readings from Last 1 Encounters:   09/20/21 23.24 kg/m²   BMI is within normal parameters. No follow-up required.    Does the patient have evidence of cognitive impairment? No    Physical Exam  Vitals and nursing note reviewed.   Constitutional:       Appearance: She is well-developed. She is not diaphoretic.   HENT:      Head: Normocephalic.      Right Ear: External ear normal.      Left Ear: External ear normal.      Nose: Nose normal.      Mouth/Throat:      Pharynx: No oropharyngeal exudate.   Eyes:      Conjunctiva/sclera: Conjunctivae normal.      Pupils: Pupils are equal, round, and reactive to light.   Neck:      Thyroid: No thyromegaly.   Cardiovascular:      Rate and Rhythm: Normal rate and regular rhythm.      Heart sounds: Normal heart sounds. No murmur heard.     Pulmonary:      Effort: Pulmonary effort is normal. No respiratory distress.      Breath sounds: Normal breath sounds.   Chest:      Chest wall: No tenderness.   Abdominal:      General: Bowel sounds are normal. There is no distension.      Palpations: Abdomen is soft. There is no mass.      Tenderness: There is no abdominal tenderness. There is no guarding or rebound.   Musculoskeletal:         General: Normal range of motion.      Cervical back: Normal range of motion and neck supple.   Lymphadenopathy:      Cervical: No cervical adenopathy.   Skin:     General: Skin is warm and dry.      Findings: No erythema or rash.   Neurological:      Mental Status: She is alert and oriented to person, place, and time.      Motor: No abnormal muscle tone.      Coordination: Coordination normal.      Deep Tendon Reflexes: Reflexes are normal and symmetric. Reflexes normal.   Psychiatric:         Behavior: Behavior normal.         Thought Content: Thought content normal.         " Judgment: Judgment normal.                 HEALTH RISK ASSESSMENT    Smoking Status:  Social History     Tobacco Use   Smoking Status Never Smoker   Smokeless Tobacco Never Used     Alcohol Consumption:  Social History     Substance and Sexual Activity   Alcohol Use No     Fall Risk Screen:    LENA Fall Risk Assessment was completed, and patient is at LOW risk for falls.Assessment completed on:8/11/2021    Depression Screening:  PHQ-2/PHQ-9 Depression Screening 9/20/2021   Little interest or pleasure in doing things 0   Feeling down, depressed, or hopeless 0   Trouble falling or staying asleep, or sleeping too much 2   Feeling tired or having little energy 0   Poor appetite or overeating 0   Feeling bad about yourself - or that you are a failure or have let yourself or your family down 0   Trouble concentrating on things, such as reading the newspaper or watching television 0   Moving or speaking so slowly that other people could have noticed. Or the opposite - being so fidgety or restless that you have been moving around a lot more than usual 0   Thoughts that you would be better off dead, or of hurting yourself in some way 0   Total Score 2   If you checked off any problems, how difficult have these problems made it for you to do your work, take care of things at home, or get along with other people? Not difficult at all       Health Habits and Functional and Cognitive Screening:  Functional & Cognitive Status 9/20/2021   Do you have difficulty preparing food and eating? No   Do you have difficulty bathing yourself, getting dressed or grooming yourself? No   Do you have difficulty using the toilet? No   Do you have difficulty moving around from place to place? No   Do you have trouble with steps or getting out of a bed or a chair? Yes   Current Diet Well Balanced Diet   Dental Exam Up to date   Eye Exam Up to date   Exercise (times per week) 0 times per week   Current Exercises Include No Regular Exercise    Current Exercise Activities Include -   Do you need help using the phone?  No   Are you deaf or do you have serious difficulty hearing?  No   Do you need help with transportation? No   Do you need help shopping? No   Do you need help preparing meals?  No   Do you need help with housework?  No   Do you need help with laundry? No   Do you need help taking your medications? No   Do you need help managing money? No   Do you ever drive or ride in a car without wearing a seat belt? No   Have you felt unusual stress, anger or loneliness in the last month? No   Who do you live with? -   If you need help, do you have trouble finding someone available to you? No   Have you been bothered in the last four weeks by sexual problems? No   Do you have difficulty concentrating, remembering or making decisions? No       Age-appropriate Screening Schedule:  Refer to the list below for future screening recommendations based on patient's age, sex and/or medical conditions. Orders for these recommended tests are listed in the plan section. The patient has been provided with a written plan.    Health Maintenance   Topic Date Due   • ZOSTER VACCINE (2 of 2) 11/03/2013   • LIPID PANEL  06/02/2021   • MAMMOGRAM  08/14/2021   • DXA SCAN  08/14/2021   • INFLUENZA VACCINE  10/01/2021   • PAP SMEAR  12/02/2023   • TDAP/TD VACCINES (2 - Td or Tdap) 09/21/2024              Assessment/Plan   CMS Preventative Services Quick Reference  Risk Factors Identified During Encounter  Cardiovascular Disease  Fall Risk-High or Moderate  Polypharmacy  The above risks/problems have been discussed with the patient.  Follow up actions/plans if indicated are seen below in the Assessment/Plan Section.  Pertinent information has been shared with the patient in the After Visit Summary.    Diagnoses and all orders for this visit:    1. Medicare annual wellness visit, subsequent (Primary)    2. Encounter for screening mammogram for breast cancer  -     Mammo  Screening Digital Tomosynthesis Bilateral With CAD; Future    3. Essential hypertension  -     losartan (Cozaar) 50 MG tablet; Take 1 tablet by mouth Daily.  Dispense: 90 tablet; Refill: 1  -     metoprolol succinate XL (TOPROL-XL) 50 MG 24 hr tablet; Take 1 tablet by mouth Daily.  Dispense: 90 tablet; Refill: 3  -     CBC & Differential  -     Comprehensive Metabolic Panel    4. Mixed hyperlipidemia  -     atorvastatin (Lipitor) 10 MG tablet; Take 1 tablet by mouth Daily.  Dispense: 90 tablet; Refill: 1  -     Lipid Panel  -     TSH    Other orders  -     famotidine (PEPCID) 40 MG tablet; Take 1 tablet by mouth Daily.  Dispense: 90 tablet; Refill: 1        Follow Up:   No follow-ups on file.     An After Visit Summary and PPPS were made available to the patient.

## 2021-09-21 LAB
ALBUMIN SERPL-MCNC: 4.5 G/DL (ref 3.5–5.2)
ALBUMIN/GLOB SERPL: 1.7 G/DL
ALP SERPL-CCNC: 81 U/L (ref 39–117)
ALT SERPL W P-5'-P-CCNC: 14 U/L (ref 1–33)
ANION GAP SERPL CALCULATED.3IONS-SCNC: 9.2 MMOL/L (ref 5–15)
AST SERPL-CCNC: 20 U/L (ref 1–32)
BASOPHILS # BLD AUTO: 0.04 10*3/MM3 (ref 0–0.2)
BASOPHILS NFR BLD AUTO: 0.5 % (ref 0–1.5)
BILIRUB SERPL-MCNC: 0.2 MG/DL (ref 0–1.2)
BUN SERPL-MCNC: 21 MG/DL (ref 8–23)
BUN/CREAT SERPL: 24.7 (ref 7–25)
CALCIUM SPEC-SCNC: 9.9 MG/DL (ref 8.6–10.5)
CHLORIDE SERPL-SCNC: 104 MMOL/L (ref 98–107)
CHOLEST SERPL-MCNC: 211 MG/DL (ref 0–200)
CO2 SERPL-SCNC: 28.8 MMOL/L (ref 22–29)
CREAT SERPL-MCNC: 0.85 MG/DL (ref 0.57–1)
DEPRECATED RDW RBC AUTO: 40.2 FL (ref 37–54)
EOSINOPHIL # BLD AUTO: 0.1 10*3/MM3 (ref 0–0.4)
EOSINOPHIL NFR BLD AUTO: 1.3 % (ref 0.3–6.2)
ERYTHROCYTE [DISTWIDTH] IN BLOOD BY AUTOMATED COUNT: 11.8 % (ref 12.3–15.4)
GFR SERPL CREATININE-BSD FRML MDRD: 66 ML/MIN/1.73
GLOBULIN UR ELPH-MCNC: 2.6 GM/DL
GLUCOSE SERPL-MCNC: 120 MG/DL (ref 65–99)
HCT VFR BLD AUTO: 43.9 % (ref 34–46.6)
HDLC SERPL-MCNC: 67 MG/DL (ref 40–60)
HGB BLD-MCNC: 14.7 G/DL (ref 12–15.9)
IMM GRANULOCYTES # BLD AUTO: 0.02 10*3/MM3 (ref 0–0.05)
IMM GRANULOCYTES NFR BLD AUTO: 0.3 % (ref 0–0.5)
LDLC SERPL CALC-MCNC: 114 MG/DL (ref 0–100)
LDLC/HDLC SERPL: 1.63 {RATIO}
LYMPHOCYTES # BLD AUTO: 1.46 10*3/MM3 (ref 0.7–3.1)
LYMPHOCYTES NFR BLD AUTO: 19.4 % (ref 19.6–45.3)
MCH RBC QN AUTO: 31.5 PG (ref 26.6–33)
MCHC RBC AUTO-ENTMCNC: 33.5 G/DL (ref 31.5–35.7)
MCV RBC AUTO: 94 FL (ref 79–97)
MONOCYTES # BLD AUTO: 0.65 10*3/MM3 (ref 0.1–0.9)
MONOCYTES NFR BLD AUTO: 8.6 % (ref 5–12)
NEUTROPHILS NFR BLD AUTO: 5.27 10*3/MM3 (ref 1.7–7)
NEUTROPHILS NFR BLD AUTO: 69.9 % (ref 42.7–76)
NRBC BLD AUTO-RTO: 0.1 /100 WBC (ref 0–0.2)
PLATELET # BLD AUTO: 276 10*3/MM3 (ref 140–450)
PMV BLD AUTO: 10.3 FL (ref 6–12)
POTASSIUM SERPL-SCNC: 4.5 MMOL/L (ref 3.5–5.2)
PROT SERPL-MCNC: 7.1 G/DL (ref 6–8.5)
RBC # BLD AUTO: 4.67 10*6/MM3 (ref 3.77–5.28)
SODIUM SERPL-SCNC: 142 MMOL/L (ref 136–145)
TRIGL SERPL-MCNC: 174 MG/DL (ref 0–150)
TSH SERPL DL<=0.05 MIU/L-ACNC: 0.55 UIU/ML (ref 0.27–4.2)
VLDLC SERPL-MCNC: 30 MG/DL (ref 5–40)
WBC # BLD AUTO: 7.54 10*3/MM3 (ref 3.4–10.8)

## 2021-09-22 ENCOUNTER — OUTSIDE FACILITY SERVICE (OUTPATIENT)
Dept: PAIN MEDICINE | Facility: CLINIC | Age: 71
End: 2021-09-22

## 2021-09-22 PROCEDURE — 99152 MOD SED SAME PHYS/QHP 5/>YRS: CPT | Performed by: ANESTHESIOLOGY

## 2021-09-22 PROCEDURE — 64484 NJX AA&/STRD TFRM EPI L/S EA: CPT | Performed by: ANESTHESIOLOGY

## 2021-09-22 PROCEDURE — 64483 NJX AA&/STRD TFRM EPI L/S 1: CPT | Performed by: ANESTHESIOLOGY

## 2021-09-23 ENCOUNTER — TELEPHONE (OUTPATIENT)
Dept: PAIN MEDICINE | Facility: CLINIC | Age: 71
End: 2021-09-23

## 2021-09-23 NOTE — TELEPHONE ENCOUNTER
LVM for pt regarding how they’re feeling after yesterday’s procedure with Dr. Philippe. Advised no follow up appointment scheduled, and to call the office as needed.

## 2021-11-30 ENCOUNTER — HOSPITAL ENCOUNTER (OUTPATIENT)
Dept: MAMMOGRAPHY | Facility: HOSPITAL | Age: 71
Discharge: HOME | End: 2021-11-30
Admitting: FAMILY MEDICINE

## 2021-11-30 DIAGNOSIS — Z12.31 ENCOUNTER FOR SCREENING MAMMOGRAM FOR BREAST CANCER: ICD-10-CM

## 2021-11-30 PROCEDURE — 77067 SCR MAMMO BI INCL CAD: CPT | Performed by: RADIOLOGY

## 2021-11-30 PROCEDURE — 77067 SCR MAMMO BI INCL CAD: CPT

## 2021-11-30 PROCEDURE — 77063 BREAST TOMOSYNTHESIS BI: CPT | Performed by: RADIOLOGY

## 2021-11-30 PROCEDURE — 77063 BREAST TOMOSYNTHESIS BI: CPT

## 2021-12-13 ENCOUNTER — TELEPHONE (OUTPATIENT)
Dept: FAMILY MEDICINE CLINIC | Facility: CLINIC | Age: 71
End: 2021-12-13

## 2021-12-13 NOTE — TELEPHONE ENCOUNTER
Caller: Carleen Payne    Relationship: Self    Best call back number: [phone]    What form or medical record are you requesting: NAME, DATE OF BIRTH, BEGIN AND END FOR , PRIMARY CARE PHYSICIAN SIGNATURE.      Who is requesting this form or medical record from you:  [facility], FOR DOCTOR LUCILA      How would you like to receive the form or medical records (pick-up, mail, fax): FAX    If fax, what is the fax number: [phone]      Timeframe paperwork needed: AS SOON AS POSSIBLE    Additional notes: PATIENT REQUESTING IF DOCTOR ISAAC CAN PLEASE FAX NAME, DATE OF BIRTH, BEGIN AND END FOR TRI CARE, PRIMARY CARE PHYSICIAN SIGNATURE PER THEIR REQUEST IN ORDER TO VERIFY PATIENT HAS TRI CARE.

## 2021-12-14 DIAGNOSIS — J30.2 SEASONAL ALLERGIC RHINITIS, UNSPECIFIED TRIGGER: ICD-10-CM

## 2021-12-14 DIAGNOSIS — J30.1 NON-SEASONAL ALLERGIC RHINITIS DUE TO POLLEN: Primary | ICD-10-CM

## 2022-01-03 DIAGNOSIS — I10 ESSENTIAL HYPERTENSION: ICD-10-CM

## 2022-01-03 DIAGNOSIS — E78.2 MIXED HYPERLIPIDEMIA: ICD-10-CM

## 2022-01-03 RX ORDER — METOPROLOL SUCCINATE 50 MG/1
50 TABLET, EXTENDED RELEASE ORAL DAILY
Qty: 90 TABLET | Refills: 3 | Status: SHIPPED | OUTPATIENT
Start: 2022-01-03 | End: 2022-01-10 | Stop reason: SDUPTHER

## 2022-01-03 RX ORDER — ATORVASTATIN CALCIUM 10 MG/1
10 TABLET, FILM COATED ORAL DAILY
Qty: 90 TABLET | Refills: 1 | Status: SHIPPED | OUTPATIENT
Start: 2022-01-03 | End: 2022-01-10 | Stop reason: SDUPTHER

## 2022-01-03 RX ORDER — LOSARTAN POTASSIUM 50 MG/1
50 TABLET ORAL DAILY
Qty: 90 TABLET | Refills: 1 | Status: SHIPPED | OUTPATIENT
Start: 2022-01-03 | End: 2022-01-10 | Stop reason: SDUPTHER

## 2022-01-10 DIAGNOSIS — E78.2 MIXED HYPERLIPIDEMIA: ICD-10-CM

## 2022-01-10 DIAGNOSIS — I10 ESSENTIAL HYPERTENSION: ICD-10-CM

## 2022-01-10 RX ORDER — ATORVASTATIN CALCIUM 10 MG/1
10 TABLET, FILM COATED ORAL DAILY
Qty: 90 TABLET | Refills: 0 | Status: SHIPPED | OUTPATIENT
Start: 2022-01-10 | End: 2022-01-13 | Stop reason: SDUPTHER

## 2022-01-10 RX ORDER — LOSARTAN POTASSIUM 50 MG/1
50 TABLET ORAL DAILY
Qty: 90 TABLET | Refills: 0 | Status: SHIPPED | OUTPATIENT
Start: 2022-01-10 | End: 2022-01-13 | Stop reason: SDUPTHER

## 2022-01-10 RX ORDER — METOPROLOL SUCCINATE 50 MG/1
50 TABLET, EXTENDED RELEASE ORAL DAILY
Qty: 90 TABLET | Refills: 0 | Status: SHIPPED | OUTPATIENT
Start: 2022-01-10 | End: 2022-01-13 | Stop reason: SDUPTHER

## 2022-01-13 DIAGNOSIS — I10 ESSENTIAL HYPERTENSION: ICD-10-CM

## 2022-01-13 DIAGNOSIS — E78.2 MIXED HYPERLIPIDEMIA: ICD-10-CM

## 2022-01-13 RX ORDER — ATORVASTATIN CALCIUM 10 MG/1
10 TABLET, FILM COATED ORAL DAILY
Qty: 90 TABLET | Refills: 0 | Status: SHIPPED | OUTPATIENT
Start: 2022-01-13 | End: 2022-02-01 | Stop reason: SDUPTHER

## 2022-01-13 RX ORDER — LOSARTAN POTASSIUM 50 MG/1
50 TABLET ORAL DAILY
Qty: 90 TABLET | Refills: 0 | Status: SHIPPED | OUTPATIENT
Start: 2022-01-13 | End: 2022-02-01 | Stop reason: SDUPTHER

## 2022-01-13 RX ORDER — METOPROLOL SUCCINATE 50 MG/1
50 TABLET, EXTENDED RELEASE ORAL DAILY
Qty: 90 TABLET | Refills: 0 | Status: SHIPPED | OUTPATIENT
Start: 2022-01-13 | End: 2022-02-01 | Stop reason: SDUPTHER

## 2022-01-13 NOTE — TELEPHONE ENCOUNTER
Caller: [facility]IME [facility]    Relationship:     Best call back number: [phone]    Requested Prescriptions:   Requested Prescriptions     Pending Prescriptions Disp Refills   • losartan (Cozaar) 50 MG tablet 90 tablet 0     Sig: Take 1 tablet by mouth Daily.   • metoprolol succinate XL (TOPROL-XL) 50 MG 24 hr tablet 90 tablet 0     Sig: Take 1 tablet by mouth Daily.   • atorvastatin (Lipitor) 10 MG tablet 90 tablet 0     Sig: Take 1 tablet by mouth Daily.        Pharmacy where request should be sent: [facility] - [address] [phone] [facility] [phone]      Additional details provided by patient: KIRSTEN REQUESTING MED REFILL FOR THE 3 MEDICATIONS AND STATES THE PATIENT IS AT RISK OF RUNNING OUT OF MEDICATION     Does the patient have less than a 3 day supply:  [x] Yes  [] No    Eri Alcantara Rep   01/13/22 08:46 EST

## 2022-01-14 DIAGNOSIS — E78.2 MIXED HYPERLIPIDEMIA: ICD-10-CM

## 2022-01-14 RX ORDER — ATORVASTATIN CALCIUM 10 MG/1
10 TABLET, FILM COATED ORAL DAILY
Qty: 90 TABLET | Refills: 0 | Status: CANCELLED | OUTPATIENT
Start: 2022-01-14

## 2022-01-17 ENCOUNTER — TELEPHONE (OUTPATIENT)
Dept: FAMILY MEDICINE CLINIC | Facility: CLINIC | Age: 72
End: 2022-01-17

## 2022-01-17 DIAGNOSIS — I10 ESSENTIAL HYPERTENSION: ICD-10-CM

## 2022-01-17 DIAGNOSIS — E78.2 MIXED HYPERLIPIDEMIA: ICD-10-CM

## 2022-01-17 RX ORDER — LOSARTAN POTASSIUM 50 MG/1
50 TABLET ORAL DAILY
Qty: 90 TABLET | Refills: 0 | Status: CANCELLED | OUTPATIENT
Start: 2022-01-17

## 2022-01-17 RX ORDER — ATORVASTATIN CALCIUM 10 MG/1
10 TABLET, FILM COATED ORAL DAILY
Qty: 90 TABLET | Refills: 0 | Status: CANCELLED | OUTPATIENT
Start: 2022-01-17

## 2022-01-17 RX ORDER — METOPROLOL SUCCINATE 50 MG/1
50 TABLET, EXTENDED RELEASE ORAL DAILY
Qty: 90 TABLET | Refills: 0 | Status: CANCELLED | OUTPATIENT
Start: 2022-01-17

## 2022-01-17 NOTE — TELEPHONE ENCOUNTER
Caller: Carleen Payne    Relationship: Self    Best call back number: [phone]    Who is your current provider: MARLIN    Who would you like your new provider to be: MONISHA    What are your reasons for transferring care: MARLIN IS NO LONGER IN OFFICE    Additional notes: PATIENT WOULD LIKE TO TRANSFER CARE TO MONISHA

## 2022-01-20 ENCOUNTER — TELEPHONE (OUTPATIENT)
Dept: PAIN MEDICINE | Facility: CLINIC | Age: 72
End: 2022-01-20

## 2022-01-20 NOTE — TELEPHONE ENCOUNTER
Caller: JOSH SANTANA     Relationship: SELF     Best call back number: [phone]    What orders are you requesting (i.e. lab or imaging): EPIDURAL     In what timeframe would the patient need to come in: ASAP

## 2022-02-01 DIAGNOSIS — I10 ESSENTIAL HYPERTENSION: ICD-10-CM

## 2022-02-01 DIAGNOSIS — E78.2 MIXED HYPERLIPIDEMIA: ICD-10-CM

## 2022-02-01 RX ORDER — ATORVASTATIN CALCIUM 10 MG/1
10 TABLET, FILM COATED ORAL DAILY
Qty: 90 TABLET | Refills: 0 | Status: SHIPPED | OUTPATIENT
Start: 2022-02-01 | End: 2022-02-08 | Stop reason: SDUPTHER

## 2022-02-01 RX ORDER — LOSARTAN POTASSIUM 50 MG/1
50 TABLET ORAL DAILY
Qty: 90 TABLET | Refills: 0 | Status: SHIPPED | OUTPATIENT
Start: 2022-02-01 | End: 2022-02-08 | Stop reason: SDUPTHER

## 2022-02-01 RX ORDER — METOPROLOL SUCCINATE 50 MG/1
50 TABLET, EXTENDED RELEASE ORAL DAILY
Qty: 90 TABLET | Refills: 0 | Status: SHIPPED | OUTPATIENT
Start: 2022-02-01 | End: 2022-02-08 | Stop reason: SDUPTHER

## 2022-02-08 ENCOUNTER — OFFICE VISIT (OUTPATIENT)
Dept: FAMILY MEDICINE CLINIC | Facility: CLINIC | Age: 72
End: 2022-02-08

## 2022-02-08 VITALS
OXYGEN SATURATION: 99 % | BODY MASS INDEX: 23.79 KG/M2 | TEMPERATURE: 98.3 F | SYSTOLIC BLOOD PRESSURE: 126 MMHG | DIASTOLIC BLOOD PRESSURE: 88 MMHG | HEART RATE: 82 BPM | WEIGHT: 126 LBS | HEIGHT: 61 IN | RESPIRATION RATE: 16 BRPM

## 2022-02-08 DIAGNOSIS — E78.2 MIXED HYPERLIPIDEMIA: ICD-10-CM

## 2022-02-08 DIAGNOSIS — I10 ESSENTIAL HYPERTENSION: ICD-10-CM

## 2022-02-08 PROCEDURE — 99214 OFFICE O/P EST MOD 30 MIN: CPT | Performed by: STUDENT IN AN ORGANIZED HEALTH CARE EDUCATION/TRAINING PROGRAM

## 2022-02-08 RX ORDER — METOPROLOL TARTRATE AND HYDROCHLOROTHIAZIDE 100; 50 MG/1; MG/1
TABLET ORAL EVERY 12 HOURS SCHEDULED
COMMUNITY
End: 2022-02-08 | Stop reason: SDUPTHER

## 2022-02-08 RX ORDER — LORATADINE 10 MG/1
10 TABLET ORAL DAILY
Qty: 90 TABLET | Refills: 3 | Status: SHIPPED | OUTPATIENT
Start: 2022-02-08 | End: 2023-01-16

## 2022-02-08 RX ORDER — LOSARTAN POTASSIUM 50 MG/1
TABLET ORAL EVERY 24 HOURS
COMMUNITY
End: 2022-02-08 | Stop reason: SDUPTHER

## 2022-02-08 RX ORDER — LOSARTAN POTASSIUM 50 MG/1
50 TABLET ORAL DAILY
Qty: 90 TABLET | Refills: 3 | Status: SHIPPED | OUTPATIENT
Start: 2022-02-08 | End: 2023-01-31

## 2022-02-08 RX ORDER — METOPROLOL SUCCINATE 50 MG/1
50 TABLET, EXTENDED RELEASE ORAL DAILY
Qty: 90 TABLET | Refills: 3 | Status: SHIPPED | OUTPATIENT
Start: 2022-02-08 | End: 2023-01-31

## 2022-02-08 RX ORDER — ATORVASTATIN CALCIUM 10 MG/1
10 TABLET, FILM COATED ORAL DAILY
Qty: 90 TABLET | Refills: 3 | Status: SHIPPED | OUTPATIENT
Start: 2022-02-08 | End: 2023-01-31

## 2022-02-08 RX ORDER — ATORVASTATIN CALCIUM 10 MG/1
TABLET, FILM COATED ORAL EVERY 24 HOURS
COMMUNITY
End: 2022-02-08 | Stop reason: SDUPTHER

## 2022-02-08 NOTE — PROGRESS NOTES
New Patient Office Visit      Patient Name: Carleen Payne  : 1950   MRN: [id number]     Chief Complaint:  Hypertension (update meds. offboarding [place])     History of Present Illness:     htn  Taking losartan and metoprolol.     hld  Taking lipitor no side effects compliant    Osteoporosis  Taking calcium otc supplement and vitamin d otc she is not sure how much.   dexa scan 2019 showing osteoporosis. She did fosamax for two years back in her 50s. She declines repeat dexa.         Subjective        Past Medical History:   Diagnosis Date   • Hyperlipidemia    • Hypertension    • Osteoporosis        Past Surgical History:   Procedure Laterality Date   • NO PAST SURGERIES         Family History   Problem Relation Age of Onset   • Hypertension Mother    • Hypertension Father    • Stroke Father    • Heart attack Brother    • Thyroid disease Daughter    • Stroke Maternal Grandmother    • Cancer Maternal Grandfather    • Cancer Paternal Grandmother    • Ovarian cancer Paternal Grandmother         60'S   • Arthritis Brother    • Ovarian cancer Paternal Aunt         60'S   • Ovarian cancer Cousin         PATERNAL 1ST COUSIN, 40'S   • Ovarian cancer Cousin         PATERNAL 1ST COUSIN, 60'S   • Breast cancer Neg Hx        Social History     Socioeconomic History   • Marital status:    Tobacco Use   • Smoking status: Never Smoker   • Smokeless tobacco: Never Used   Substance and Sexual Activity   • Alcohol use: No   • Drug use: No   • Sexual activity: Yes     Partners: Male          Current Outpatient Medications:   •  atorvastatin (Lipitor) 10 MG tablet, Take 1 tablet by mouth Daily., Disp: 90 tablet, Rfl: 3  •  Calcium 150 MG tablet, Take 150 mg by mouth Daily., Disp: , Rfl:   •  EPINEPHrine (EPIPEN) 0.3 MG/0.3ML solution auto-injector injection, INJECT INTRAMUSCULARLY AS NEEDED FOR ALLERGIC REACTION, Disp: , Rfl:   •  losartan (Cozaar) 50 MG tablet, Take 1 tablet by mouth Daily., Disp: 90 tablet, Rfl:  "3  •  metoprolol succinate XL (TOPROL-XL) 50 MG 24 hr tablet, Take 1 tablet by mouth Daily., Disp: 90 tablet, Rfl: 3  •  Cholecalciferol (VITAMIN D) 1000 UNITS tablet, Take 1,000 Units by mouth Daily., Disp: , Rfl:   •  loratadine (Claritin) 10 MG tablet, Take 1 tablet by mouth Daily., Disp: 90 tablet, Rfl: 3    Allergies   Allergen Reactions   • Erythromycin Base Nausea And Vomiting   • Gabapentin Swelling     Body swelling       Objective     Physical Exam:  Vitals:    02/08/22 0939   BP: 126/88   Pulse: 82   Resp: 16   Temp: 98.3 °F (36.8 °C)   SpO2: 99%   Weight: 57.2 kg (126 lb)   Height: 154.9 cm (61\")      Body mass index is 23.81 kg/m².     Physical Exam  Constitutional:       General: She is not in acute distress.     Appearance: Normal appearance.   HENT:      Head: Normocephalic and atraumatic.   Eyes:      Extraocular Movements: Extraocular movements intact.   Cardiovascular:      Rate and Rhythm: Normal rate and regular rhythm.      Heart sounds: No murmur heard.      Pulmonary:      Effort: Pulmonary effort is normal. No respiratory distress.      Breath sounds: Normal breath sounds.   Abdominal:      General: Abdomen is flat.   Musculoskeletal:         General: No swelling.      Cervical back: Normal range of motion.   Skin:     Findings: No rash.   Neurological:      General: No focal deficit present.      Mental Status: She is alert.   Psychiatric:         Mood and Affect: Mood normal.              Assessment / Plan      Assessment/Plan:   Diagnoses and all orders for this visit:    1. Essential hypertension  -     metoprolol succinate XL (TOPROL-XL) 50 MG 24 hr tablet; Take 1 tablet by mouth Daily.  Dispense: 90 tablet; Refill: 3  -     losartan (Cozaar) 50 MG tablet; Take 1 tablet by mouth Daily.  Dispense: 90 tablet; Refill: 3    2. Mixed hyperlipidemia  -     atorvastatin (Lipitor) 10 MG tablet; Take 1 tablet by mouth Daily.  Dispense: 90 tablet; Refill: 3    Other orders  -     loratadine " (Claritin) 10 MG tablet; Take 1 tablet by mouth Daily.  Dispense: 90 tablet; Refill: 3     Osteoporosis  Taking calcium otc supplement and vitamin d otc she is not sure how much.   dexa scan 2019 showing osteoporosis. She did fosamax for two years back in her 50s. She declines repeat dexa. Discussed risk of fracture and she does not want repeat dexa or further treatment. .Discussed calcium 1200 mg daily vit d 1000 units daily and weight bearing exercise.     Return in about 6 months (around 8/8/2022).       Iona Mcclendon D.O.  [facility] [place]

## 2022-02-23 DIAGNOSIS — M48.062 LUMBAR STENOSIS WITH NEUROGENIC CLAUDICATION: Primary | ICD-10-CM

## 2022-02-23 NOTE — PROGRESS NOTES
"Chief Complaint: \"Lower back and right leg pain.\"       History of Present Illness:   Patient: Ms. Carleen Payne, 71 y.o. female originally referred by Dr. Priyanka Guardado in consultation for intractable chronic lower back pain. Patient reports a  longstanding history of lower back pain, which began after a fall.  She was last seen on September 22, 2021, when she underwent repeat right L4-L5 and right L5-S1 transforaminal epidural steroid injection, from which she reports experiencing complete pain relief lasting 4 months, with recurrence of her symptoms over the last several weeks.  Prior to that on she was last seen on May 12, 2021, when she underwent repeat right L4-L5 and right L5-S1 transforaminal epidural steroid injection, that provided her with almost complete pain relief and functional improvement lasting about 3 months.  She has/has not participated in any recent physical therapy.  She returns today for reevaluation of her chronic lower back and right lower extremity pain.   Pain description: constant pain with intermittent exacerbation, described as aching and burning sensation.   Radiation of pain: The lower back back radiates into the posterior aspect of the right thigh, right calf to the ankle.    Pain intensity today: 4/10  Average pain intensity last week: 4/10  Pain intensity ranges from: 4/10 to 4/10  Aggravating factors: Pain increases with lying down, and at times when she stands.  Alleviating factors: Pain decreases with analgesics, sitting.    Associated symptoms:   Patient denies numbness or weakness in the lower extremities  Patient denies any new bladder or bowel problems.   Patient denies difficulties with her balance or recent falls    Review of previous therapies and additional medical records:  Carleen Payne has already failed the following measures, including:   Conservative measures: oral analgesics and physical therapy   Interventional measures: 02/26/2020: DxTx right L4-L5 and right " L5-S1 TESI  08/17/2020: right L4-L5 and right L5-S1 TESI  05/12/2021: right L4-L5 and right L5-S1 TESI  09/22/2021: right L4-L5 and right L5-S1 TESI  former patient of Dr. Talbert. She underwent pidural injections (RT L4-L5 transforaminals x4) with significant relief  Surgical measures: No history of lumbar spine surgery  Carleen Payne underwent neurosurgical consultation with Dr. Gomes on 06/16/2017, and was found not to be a surgical candidate.  Carleen Payne is a healthy adult other than her chronic pain and HTN.  In terms of current analgesics, Carleen Payne takes: aspirin PRN  I have reviewed Anatoly Report is consistent to medication reconciliation.     Global Pain Scale 06-20 2019 02-19 2020 03-23 2020 02-24 2022           Pain  1  12  10  10           Feelings  0  0  0  0           Clinical outcomes  0  0  2  2           Activities  0  8  0  2           GPS Total:  3  20  12  14              Review of Diagnostic Studies:  XR SPINE LUMBAR FLEX AND EXT- FINDINGS: There is again late grade 1 or early grade 2 anterior subluxation of L4 on L5 similar to the prior study. No more than 2 mm of anterior subluxation is seen from extension to flexion. No significant movement is seen at the remaining levels. Late grade 1 or early grade 2 anterior subluxation of L4 on L5, with minimal change in subluxation from flexion to extension. No  other significant lumbar disease is seen elsewhere.  XR SPINE LUMBAR FLEX AND EXT- 01/25/2017: There is considerable anterolisthesis of L4 forward from L5 with reduction of L4-L5 disc space and there is trace anterolisthesis of L5 forward from S1. Flexion and extension images reveal no evidence of instability   MRI of the lumbar spine without contrast on November 2, 2016: Radiology report. Mild disc desiccation at all levels. There are sagittal annular fissures at the lower 4 lumbar levels.  Axial imaging:  T12-L1: Small posterior left paracentral disc protrusion. Mild effacement  of the ventral leftward thecal sac without significant spinal canal or NF stenosis   L1-L2: negative  L2-L3 and L3-L4: Mild lateral disc bulging.  No canal or NF stenosis  L4-L5: Moderate facet hypertrophy associated with bilateral joint effusions and a grade 1 anterolisthesis of L4 on L5. Mild circumferential disc bulge with mild superior pseudo-protrusion of disc material.  Mild narrowing of the neuroforamina at the thecal sac. There is significant narrowing of the superior right subarticular zone with effacement of the right L5 nerve root.  There is mild to moderate narrowing of the superior left subarticular zone with mild effacement of the left L5 nerve root sheath.  L5-S1: Mild disc space narrowing and mild facet hypertrophy. Mild right and mild to moderate left subarticular zone narrowing just above the level of the pedicles.  Mild effacement of the left S1 nerve root.  There may be slight effacement of the right S1 nerve root.  No narrowing of the thecal sac.  Mild neuroforaminal stenosis    Review of Systems   Allergic/Immunologic: Positive for environmental allergies.   All other systems reviewed and are negative.        Patient Active Problem List   Diagnosis   • Lumbar radiculopathy   • Right sided sciatica   • Leg pain   • Lumbar stenosis with neurogenic claudication   • History of compression fracture of vertebral column   • Osteoporosis   • Spondylolisthesis of lumbar region   • DDD (degenerative disc disease), lumbar   • Mild cognitive impairment   • Impetigo   • Preseptal cellulitis of right lower eyelid   • Dermatitis   • Edema of left eyelid       Past Medical History:   Diagnosis Date   • Hyperlipidemia    • Hypertension    • Osteoporosis          Past Surgical History:   Procedure Laterality Date   • NO PAST SURGERIES           Family History   Problem Relation Age of Onset   • Hypertension Mother    • Hypertension Father    • Stroke Father    • Heart attack Brother    • Thyroid disease Daughter  "   • Stroke Maternal Grandmother    • Cancer Maternal Grandfather    • Cancer Paternal Grandmother    • Ovarian cancer Paternal Grandmother         60'S   • Arthritis Brother    • Ovarian cancer Paternal Aunt         60'S   • Ovarian cancer Cousin         PATERNAL 1ST COUSIN, 40'S   • Ovarian cancer Cousin         PATERNAL 1ST COUSIN, 60'S   • Breast cancer Neg Hx          Social History     Socioeconomic History   • Marital status:    Tobacco Use   • Smoking status: Never Smoker   • Smokeless tobacco: Never Used   Vaping Use   • Vaping Use: Never used   Substance and Sexual Activity   • Alcohol use: No   • Drug use: No   • Sexual activity: Yes     Partners: Male           Current Outpatient Medications:   •  atorvastatin (Lipitor) 10 MG tablet, Take 1 tablet by mouth Daily., Disp: 90 tablet, Rfl: 3  •  Calcium 150 MG tablet, Take 150 mg by mouth Daily., Disp: , Rfl:   •  Cholecalciferol (VITAMIN D) 1000 UNITS tablet, Take 1,000 Units by mouth Daily., Disp: , Rfl:   •  EPINEPHrine (EPIPEN) 0.3 MG/0.3ML solution auto-injector injection, INJECT INTRAMUSCULARLY AS NEEDED FOR ALLERGIC REACTION, Disp: , Rfl:   •  loratadine (Claritin) 10 MG tablet, Take 1 tablet by mouth Daily., Disp: 90 tablet, Rfl: 3  •  losartan (Cozaar) 50 MG tablet, Take 1 tablet by mouth Daily., Disp: 90 tablet, Rfl: 3  •  metoprolol succinate XL (TOPROL-XL) 50 MG 24 hr tablet, Take 1 tablet by mouth Daily., Disp: 90 tablet, Rfl: 3      Allergies   Allergen Reactions   • Erythromycin Base Nausea And Vomiting   • Gabapentin Swelling     Body swelling         /82   Pulse 96   Temp 97.5 °F (36.4 °C)   Ht 157.5 cm (62\")   Wt 58.2 kg (128 lb 6.4 oz)   SpO2 99%   BMI 23.48 kg/m²            Physical Exam:   Constitutional: Patient is oriented to person, place, and time. Patient appears well-developed and well-nourished.   HEENT: Head: Normocephalic and atraumatic. Eyes: Conjunctivae and lids are normal. Pupils: Equal, round, reactive to " light.   Neck: Trachea normal. Neck supple. No JVD present.   Pulmonary Respiratory effort: No increased work of breathing or signs of respiratory distress.   Peripheral vascular exam: Femoral: right 2+, left 2+. Posterior tibialis: right 2+ and left 2+. Dorsalis pedis: right 2+ and left 2+. No edema.   Musculoskeletal   Gait and station: Gait evaluation demonstrated a normal gait   Lumbar spine: Passive and active range of motion are full and without pain. Extension, flexion, lateral flexion, rotation of the lumbar spine did not increase or reproduce pain. Lumbar facet joint loading maneuvers are negative.   Rodrigo test and Gaenslen's test are negative   Piriformis maneuvers are negative   Palpation of the bilateral ischial tuberosities, unrevealing   Palpation of the bilateral greater trochanter, unrevealing   Examination of the Iliotibial band: unrevealing   Hip joints: The range of motion of the hip joints is full and without pain   Neurological: Patient is alert and oriented to person, place, and time. Speech: speech is normal. Cortical function: Normal mental status.   Reflex Scores:  Right patellar: 2+  Left patellar: 2+  Right Achilles: 2+  Left Achilles: 2+  Motor strength: 5/5  Motor Tone: normal tone.   Involuntary movements: none.   Superficial/Primitive Reflexes: primitive reflexes were absent.   Right Carcamo: absent  Left Carcamo: absent  Right ankle clonus: absent  Left ankle clonus: absent   Negative long tract signs. Straight leg raising test on the right elicits her posterior thigh pain. Femoral stretch sign is negative.   Sensation: No sensory loss. Sensory exam: intact to light touch, intact pain and temperature sensation, intact vibration sensation and normal proprioception.   Coordination: Normal finger to nose and heel to shin. Normal balance and negative Romberg's sign   Skin and subcutaneous tissue: Skin is warm and intact. No rash noted. No cyanosis.   Psychiatric: Judgment and insight:  Normal. Orientation to person, place and time: Normal. Recent and remote memory: Intact. Mood and affect: Normal.     ASSESSMENT:   1. Lumbar stenosis with neurogenic claudication    2. Spondylolisthesis of lumbar region    3. DDD (degenerative disc disease), lumbar    4. History of compression fracture of vertebral column    5. Osteoporosis, unspecified osteoporosis type, unspecified pathological fracture presence        PLAN/MEDICAL DECISION MAKING: I had a lengthy conversation with Ms. Carleen Payne regarding her chronic pain condition and potential therapeutic options including risks, benefits, alternative therapies, to name a few. Patient presents with a several year history of lower back pain.  She has responded significantly well to minimally invasive interventional procedures, as referenced under HPI.  Patient has failed to obtain pain relief with conservative measures, as referenced above. Patient experiences significant pain relief from interventional pain management measures, as referenced above. I have reviewed all available patient's medical records as well as previous therapies as referenced above.   Therefore, I have proposed the following plan:  1. Pharmacological measures: Reviewed. Discussed.   A. Patient takes aspirin PRN  2. Interventional pain management measures: Patient will be scheduled for repeat right L4-L5 and right L5-S1 transforaminal epidural steroid injections. We may repeat epidurals depending on patient's outcome, or facilitate NS follow-up with Dr Gomes.   3. Long-term rehabilitation efforts:  A. The patient does not have a history of falls. I did complete a risk assessment for falls  B. Patient will start a comprehensive physical therapy program for reconditioning, therapeutic exercise, upper body strengthening/posture correction, core strengthening, gait and balance training, neurodynamics, myofascial release, cupping and dry needling if pain recurs  C. Start an exercise  program such as yoga, Pilates, Juan Daniel Chi and water therapy  D. Contrast therapy: Apply ice-packs for 15-20 minutes, followed by heating pads for 15-20 minutes to affected area   4. The patient has been instructed to contact my office with any questions or difficulties. The patient understands the plan and agrees to proceed accordingly.             Patient Care Team:  Iona Mcclendon DO as PCP - General (Family Medicine)  Priyanka Guardado DO as Referring Physician (Family Medicine)  Herve Philippe MD as Consulting Physician (Pain Medicine)     No orders of the defined types were placed in this encounter.        Future Appointments   Date Time Provider Department Center   2/24/2022  2:30 PM Flores Stratton APRN [facility]   2/28/2022 10:45 AM Herve Philippe MD [facility]         RAYMOND Lorenzana

## 2022-02-24 ENCOUNTER — OFFICE VISIT (OUTPATIENT)
Dept: PAIN MEDICINE | Facility: CLINIC | Age: 72
End: 2022-02-24

## 2022-02-24 VITALS
BODY MASS INDEX: 23.63 KG/M2 | HEART RATE: 96 BPM | HEIGHT: 62 IN | OXYGEN SATURATION: 99 % | DIASTOLIC BLOOD PRESSURE: 82 MMHG | WEIGHT: 128.4 LBS | TEMPERATURE: 97.5 F | SYSTOLIC BLOOD PRESSURE: 121 MMHG

## 2022-02-24 DIAGNOSIS — M48.062 LUMBAR STENOSIS WITH NEUROGENIC CLAUDICATION: Primary | ICD-10-CM

## 2022-02-24 DIAGNOSIS — Z87.81 HISTORY OF COMPRESSION FRACTURE OF VERTEBRAL COLUMN: ICD-10-CM

## 2022-02-24 DIAGNOSIS — M48.062 LUMBAR STENOSIS WITH NEUROGENIC CLAUDICATION: ICD-10-CM

## 2022-02-24 DIAGNOSIS — M43.16 SPONDYLOLISTHESIS OF LUMBAR REGION: ICD-10-CM

## 2022-02-24 DIAGNOSIS — M81.0 OSTEOPOROSIS, UNSPECIFIED OSTEOPOROSIS TYPE, UNSPECIFIED PATHOLOGICAL FRACTURE PRESENCE: ICD-10-CM

## 2022-02-24 DIAGNOSIS — M51.36 DDD (DEGENERATIVE DISC DISEASE), LUMBAR: ICD-10-CM

## 2022-02-24 PROCEDURE — 99213 OFFICE O/P EST LOW 20 MIN: CPT | Performed by: NURSE PRACTITIONER

## 2022-02-28 ENCOUNTER — OUTSIDE FACILITY SERVICE (OUTPATIENT)
Dept: PAIN MEDICINE | Facility: CLINIC | Age: 72
End: 2022-02-28

## 2022-02-28 PROCEDURE — 99152 MOD SED SAME PHYS/QHP 5/>YRS: CPT | Performed by: ANESTHESIOLOGY

## 2022-02-28 PROCEDURE — 64484 NJX AA&/STRD TFRM EPI L/S EA: CPT | Performed by: ANESTHESIOLOGY

## 2022-02-28 PROCEDURE — 64483 NJX AA&/STRD TFRM EPI L/S 1: CPT | Performed by: ANESTHESIOLOGY

## 2022-03-01 ENCOUNTER — TELEPHONE (OUTPATIENT)
Dept: PAIN MEDICINE | Facility: CLINIC | Age: 72
End: 2022-03-01

## 2022-03-01 NOTE — TELEPHONE ENCOUNTER
Spoke with pt regarding yesterday’s procedure with Dr. Philippe. Pt stated they are doing well, with no complaints.  Advised that isn’t a follow up appointment scheduled, to call office as needed.

## 2022-05-19 ENCOUNTER — TELEPHONE (OUTPATIENT)
Dept: PAIN MEDICINE | Facility: CLINIC | Age: 72
End: 2022-05-19

## 2022-05-19 NOTE — TELEPHONE ENCOUNTER
Scheduled f/u with pt         ----- Message from RAYMOND Lorenzana sent at 5/19/2022  1:08 PM EDT -----  Regarding: FW: Injection request   She needs a follow up   ----- Message -----  From: Nichole Payne MA  Sent: 5/19/2022  12:22 PM EDT  To: RAYMOND Lorenzana  Subject: FW: Injection request                              ----- Message -----  From: Carleen Payne  Sent: 5/19/2022  12:21 PM EDT  To: [facility]  Subject: Injection request                                Josey Tanner!  I am in need of an injection, could you please schedule me for whatever appointment is necessary to start the process?  Thanks so much!  Carleen

## 2022-08-01 ENCOUNTER — OFFICE VISIT (OUTPATIENT)
Dept: PAIN MEDICINE | Facility: CLINIC | Age: 72
End: 2022-08-01

## 2022-08-01 VITALS
HEIGHT: 62 IN | RESPIRATION RATE: 12 BRPM | OXYGEN SATURATION: 98 % | DIASTOLIC BLOOD PRESSURE: 70 MMHG | WEIGHT: 129.4 LBS | BODY MASS INDEX: 23.81 KG/M2 | HEART RATE: 78 BPM | SYSTOLIC BLOOD PRESSURE: 120 MMHG

## 2022-08-01 DIAGNOSIS — M54.16 LUMBAR RADICULOPATHY: ICD-10-CM

## 2022-08-01 DIAGNOSIS — M43.16 SPONDYLOLISTHESIS OF LUMBAR REGION: ICD-10-CM

## 2022-08-01 DIAGNOSIS — M81.0 OSTEOPOROSIS, UNSPECIFIED OSTEOPOROSIS TYPE, UNSPECIFIED PATHOLOGICAL FRACTURE PRESENCE: ICD-10-CM

## 2022-08-01 DIAGNOSIS — M48.062 LUMBAR STENOSIS WITH NEUROGENIC CLAUDICATION: ICD-10-CM

## 2022-08-01 DIAGNOSIS — Z87.81 HISTORY OF COMPRESSION FRACTURE OF VERTEBRAL COLUMN: ICD-10-CM

## 2022-08-01 DIAGNOSIS — M48.062 LUMBAR STENOSIS WITH NEUROGENIC CLAUDICATION: Primary | ICD-10-CM

## 2022-08-01 DIAGNOSIS — M51.36 DDD (DEGENERATIVE DISC DISEASE), LUMBAR: ICD-10-CM

## 2022-08-01 PROCEDURE — 99213 OFFICE O/P EST LOW 20 MIN: CPT | Performed by: NURSE PRACTITIONER

## 2022-08-01 RX ORDER — PREGABALIN 50 MG/1
50 CAPSULE ORAL NIGHTLY
Qty: 30 CAPSULE | Refills: 1 | Status: SHIPPED | OUTPATIENT
Start: 2022-08-01

## 2022-08-01 NOTE — PROGRESS NOTES
"Chief Complaint: \"Lower back and right leg pain.\"       History of Present Illness:   Patient: Ms. Carleen Payne, 72 y.o. female originally referred by Dr. Priyanka Guardado in consultation for intractable chronic lower back pain. Patient reports a longstanding history of lower back pain, which began after a fall.  We last saw her on February 28, 2022, when she underwent repeat right L4-L5 and right L5-S1 transforaminal epidural steroid injections, from which she reports experiencing about 60% pain relief and functional improvement lasting a few months.  She reports a recurrence of her symptoms over the past month, and reports she has been engaging in some remodeling of her sons home which she feels has possibly limited her relief.  Prior to that on September 22, 2021, she underwent repeat right L4-L5 and right L5-S1 transforaminal epidural steroid injection, from which she reports experiencing complete pain relief lasting 4 months.  She has not participated in any recent physical therapy.  She returns today for reevaluation of her chronic lower back and right lower extremity pain.   Pain description: constant pain with intermittent exacerbation, described as aching and burning sensation.   Radiation of pain: The lower back pain radiates into the posterior aspect of the right thigh, right calf to the ankle.    Pain intensity today: 1/10  Average pain intensity last week: 4/10  Pain intensity ranges from: 1/10 to 7/10  Aggravating factors: Pain increases with lying down, and at times when she stands.  Alleviating factors: Pain decreases with analgesics, sitting, and at times walking.    Associated symptoms:   Patient denies numbness or weakness in the lower extremities  Patient denies any new bladder or bowel problems.   Patient denies difficulties with her balance or recent falls    Review of previous therapies and additional medical records:  Carleen Payne has already failed the following measures, including: "   Conservative measures: oral analgesics and physical therapy   Interventional measures: 02/26/2020: DxTx right L4-L5 and right L5-S1 TESI  08/17/2020: right L4-L5 and right L5-S1 TESI  05/12/2021: right L4-L5 and right L5-S1 TESI  09/22/2021: right L4-L5 and right L5-S1 TESI  02/28/2022: right L4-L5 and right L5-S1 TESI  Former patient of Dr. Talbert. She underwent epidural injections (RT L4-L5 transforaminals x4) with significant relief  Surgical measures: No history of lumbar spine surgery  Carleen Payne underwent neurosurgical consultation with Dr. Gomes on 06/16/2017, and was found not to be a surgical candidate.  Carleen Payne is a healthy adult other than her chronic pain and HTN.  In terms of current analgesics, Carleen Payne takes: aspirin PRN  I have reviewed Anatoly Report is consistent to medication reconciliation.     Global Pain Scale 06-20 2019 02-19 2020 03-23 2020 02-24 2022           Pain  1  12  10  10           Feelings  0  0  0  0           Clinical outcomes  0  0  2  2           Activities  0  8  0  2           GPS Total:  3  20  12  14              Review of Diagnostic Studies:  Lumbar spine x-rays and flexion-extension views: There is again late grade 1 or early grade 2 anterior subluxation of L4 on L5 similar to the prior study. No more than 2 mm of anterior subluxation is seen from extension to flexion. No significant movement is seen at the remaining levels. Late grade 1 or early grade 2 anterior subluxation of L4 on L5, with minimal change in subluxation from flexion to extension. No  other significant lumbar disease is seen elsewhere.  MRI of the lumbar spine without contrast on November 2, 2016: Radiology report. Mild disc desiccation at all levels. There are sagittal annular fissures at the lower 4 lumbar levels.  Axial imaging:  T12-L1: Small posterior left paracentral disc protrusion. Mild effacement of the ventral leftward thecal sac without significant spinal canal or NF  stenosis   L1-L2: negative  L2-L3 and L3-L4: Mild lateral disc bulging.  No canal or NF stenosis  L4-L5: Moderate facet hypertrophy associated with bilateral joint effusions and a grade 1 anterolisthesis of L4 on L5. Mild circumferential disc bulge with mild superior pseudo-protrusion of disc material.  Mild narrowing of the neuroforamina at the thecal sac. There is significant narrowing of the superior right subarticular zone with effacement of the right L5 nerve root.  There is mild to moderate narrowing of the superior left subarticular zone with mild effacement of the left L5 nerve root sheath.  L5-S1: Mild disc space narrowing and mild facet hypertrophy. Mild right and mild to moderate left subarticular zone narrowing just above the level of the pedicles.  Mild effacement of the left S1 nerve root.  There may be slight effacement of the right S1 nerve root.  No narrowing of the thecal sac.  Mild neuroforaminal stenosis    Review of Systems   Musculoskeletal: Positive for arthralgias and back pain.   Allergic/Immunologic: Positive for environmental allergies.   All other systems reviewed and are negative.        Patient Active Problem List   Diagnosis   • Lumbar radiculopathy   • Right sided sciatica   • Leg pain   • Lumbar stenosis with neurogenic claudication   • History of compression fracture of vertebral column   • Osteoporosis   • Spondylolisthesis of lumbar region   • DDD (degenerative disc disease), lumbar   • Mild cognitive impairment   • Impetigo   • Preseptal cellulitis of right lower eyelid   • Dermatitis   • Edema of left eyelid       Past Medical History:   Diagnosis Date   • Hyperlipidemia    • Hypertension    • Osteoporosis          Past Surgical History:   Procedure Laterality Date   • NO PAST SURGERIES           Family History   Problem Relation Age of Onset   • Hypertension Mother    • Hypertension Father    • Stroke Father    • Heart attack Brother    • Thyroid disease Daughter    • Stroke  "Maternal Grandmother    • Cancer Maternal Grandfather    • Cancer Paternal Grandmother    • Ovarian cancer Paternal Grandmother         60'S   • Arthritis Brother    • Ovarian cancer Paternal Aunt         60'S   • Ovarian cancer Cousin         PATERNAL 1ST COUSIN, 40'S   • Ovarian cancer Cousin         PATERNAL 1ST COUSIN, 60'S   • Breast cancer Neg Hx          Social History     Socioeconomic History   • Marital status:    Tobacco Use   • Smoking status: Never Smoker   • Smokeless tobacco: Never Used   Vaping Use   • Vaping Use: Never used   Substance and Sexual Activity   • Alcohol use: No   • Drug use: No   • Sexual activity: Yes     Partners: Male           Current Outpatient Medications:   •  atorvastatin (Lipitor) 10 MG tablet, Take 1 tablet by mouth Daily., Disp: 90 tablet, Rfl: 3  •  Calcium 150 MG tablet, Take 150 mg by mouth Daily., Disp: , Rfl:   •  Cholecalciferol (VITAMIN D) 1000 UNITS tablet, Take 1,000 Units by mouth Daily., Disp: , Rfl:   •  EPINEPHrine (EPIPEN) 0.3 MG/0.3ML solution auto-injector injection, INJECT INTRAMUSCULARLY AS NEEDED FOR ALLERGIC REACTION, Disp: , Rfl:   •  loratadine (Claritin) 10 MG tablet, Take 1 tablet by mouth Daily., Disp: 90 tablet, Rfl: 3  •  losartan (Cozaar) 50 MG tablet, Take 1 tablet by mouth Daily., Disp: 90 tablet, Rfl: 3  •  metoprolol succinate XL (TOPROL-XL) 50 MG 24 hr tablet, Take 1 tablet by mouth Daily., Disp: 90 tablet, Rfl: 3      Allergies   Allergen Reactions   • Erythromycin Base Nausea And Vomiting   • Gabapentin Swelling     Body swelling         /70   Pulse 78   Resp 12   Ht 157.5 cm (62\")   Wt 58.7 kg (129 lb 6.4 oz)   SpO2 98%   BMI 23.67 kg/m²            Physical Exam:   Constitutional: Patient is oriented to person, place, and time. Patient appears well-developed and well-nourished.   HEENT: Head: Normocephalic and atraumatic. Eyes: Conjunctivae and lids are normal. Pupils: Equal, round, reactive to light.   Neck: Trachea " normal. Neck supple. No JVD present.   Pulmonary Respiratory effort: No increased work of breathing or signs of respiratory distress.   Peripheral vascular exam: Femoral: right 2+, left 2+. Posterior tibialis: right 2+ and left 2+. Dorsalis pedis: right 2+ and left 2+. No edema.   Musculoskeletal   Gait and station: Gait evaluation demonstrated a normal gait   Lumbar spine: Passive and active range of motion are full and without pain. Extension, flexion, lateral flexion, rotation of the lumbar spine did not increase or reproduce pain. Lumbar facet joint loading maneuvers are negative.   Rodrigo test and Gaenslen's test are negative   Piriformis maneuvers are negative   Palpation of the bilateral ischial tuberosities, unrevealing   Palpation of the bilateral greater trochanter, unrevealing   Examination of the Iliotibial band: unrevealing   Hip joints: The range of motion of the hip joints is full and without pain   Neurological: Patient is alert and oriented to person, place, and time. Speech: speech is normal. Cortical function: Normal mental status.   Reflex Scores:  Right patellar: 2+  Left patellar: 2+  Right Achilles: 2+  Left Achilles: 2+  Motor strength: 5/5  Motor Tone: normal tone.   Involuntary movements: none.   Superficial/Primitive Reflexes: primitive reflexes were absent.   Right Carcamo: absent  Left Carcamo: absent  Right ankle clonus: absent  Left ankle clonus: absent   Negative long tract signs. Straight leg raising test on the right elicits her posterior thigh pain. Femoral stretch sign is negative.   Sensation: No sensory loss. Sensory exam: intact to light touch, intact pain and temperature sensation, intact vibration sensation and normal proprioception.   Coordination: Normal finger to nose and heel to shin. Normal balance and negative Romberg's sign   Skin and subcutaneous tissue: Skin is warm and intact. No rash noted. No cyanosis.   Psychiatric: Judgment and insight: Normal. Orientation to  person, place and time: Normal. Recent and remote memory: Intact. Mood and affect: Normal.       ASSESSMENT:   1. Lumbar radiculopathy    2. Lumbar stenosis with neurogenic claudication    3. Spondylolisthesis of lumbar region    4. DDD (degenerative disc disease), lumbar    5. History of compression fracture of vertebral column    6. Osteoporosis, unspecified osteoporosis type, unspecified pathological fracture presence        PLAN/MEDICAL DECISION MAKING: Ms. Carleen Payne, 72 y.o. female reports a longstanding history of lower back pain.  Her last MRI of the lumbar spine without contrast in 2016 revealed multilevel degenerative disc disease, at L4-L5 there is moderate facet hypertrophy with bilateral joint effusions, a grade 1 anterior listhesis of L4 on L5. Mild narrowing of the neuroforamina at the thecal sac. There is significant narrowing of the superior right subarticular zone with effacement of the right L5 nerve root.  There is mild to moderate narrowing of the superior left subarticular zone with mild effacement of the left L5 nerve root sheath.  At L5-S1 there is mild disc space narrowing and mild facet hypertrophy. Mild right and mild to moderate left subarticular zone narrowing just above the level of the pedicles.  Mild effacement of the left S1 nerve root.  There may be slight effacement of the right S1 nerve root.  No narrowing of the thecal sac.  Mild neuroforaminal stenosis.  She has responded exceptionally well to previous minimally invasive interventional pain management measures as referenced above.  Upon further conversation today, and examination, she could been experiencing some progression of her symptoms, therefore I recommended additional diagnostic studies.  She reports most of her leg pain occurs while she is lying down at night, I have recommended she trial some Lyrica at night.  She reports a reaction to gabapentin associated with swelling previously.  I had a lengthy conversation  with Ms. Carleen Payne regarding her chronic pain condition and potential therapeutic options including risks, benefits, alternative therapies, to name a few. Patient has failed to obtain pain relief with conservative measures, as referenced above. Patient experiences significant pain relief from interventional pain management measures, as referenced above. I have reviewed all available patient's medical records as well as previous therapies as referenced above.   Therefore, I have proposed the following plan:  1. Pharmacological measures: Reviewed. Discussed.   A. Patient takes aspirin PRN  B. Trial with Lyrica 50 mg nightly, #30, 1 refill.   2. Interventional pain management measures: Patient will be scheduled for repeat right L4-L5 and right L5-S1 transforaminal epidural steroid injections. We may repeat epidurals depending on patient's outcome, or facilitate NS follow-up with Dr Gomes depending on results of diagnostic studies and continued response.   3. Diagnostics:  A. MRI of the lumbar spine without contrast  B. Lumbar spine x-rays with flexion and extension views  4. Long-term rehabilitation efforts:  A. The patient does not have a history of falls. I did complete a risk assessment for falls  B. Patient will start a comprehensive physical therapy program for reconditioning, therapeutic exercise, upper body strengthening/posture correction, core strengthening, gait and balance training, neurodynamics, myofascial release, cupping and dry needling if pain recurs  C. Start an exercise program such as yoga, Pilates, Juan Daniel Chi and water therapy  D. Contrast therapy: Apply ice-packs for 15-20 minutes, followed by heating pads for 15-20 minutes to affected area   5. The patient has been instructed to contact my office with any questions or difficulties. The patient understands the plan and agrees to proceed accordingly.             Patient Care Team:  Iona Mcclendon DO as PCP - General (Family Medicine)  Debby  Priyanka RIZVI DO as Referring Physician (Family Medicine)  Herve Philippe MD as Consulting Physician (Pain Medicine)     No orders of the defined types were placed in this encounter.        Future Appointments   Date Time Provider Department Center   9/22/2022 10:30 AM Iona Mcclendon DO [facility]  [facility]         [facility]MOND Lorenzana

## 2022-08-23 DIAGNOSIS — M48.062 LUMBAR STENOSIS WITH NEUROGENIC CLAUDICATION: Primary | ICD-10-CM

## 2022-08-24 ENCOUNTER — OUTSIDE FACILITY SERVICE (OUTPATIENT)
Dept: PAIN MEDICINE | Facility: CLINIC | Age: 72
End: 2022-08-24

## 2022-08-24 PROCEDURE — 64484 NJX AA&/STRD TFRM EPI L/S EA: CPT | Performed by: ANESTHESIOLOGY

## 2022-08-24 PROCEDURE — 64483 NJX AA&/STRD TFRM EPI L/S 1: CPT | Performed by: ANESTHESIOLOGY

## 2022-08-25 ENCOUNTER — TELEPHONE (OUTPATIENT)
Dept: PAIN MEDICINE | Facility: CLINIC | Age: 72
End: 2022-08-25

## 2022-08-27 ENCOUNTER — HOSPITAL ENCOUNTER (OUTPATIENT)
Dept: MRI IMAGING | Facility: HOSPITAL | Age: 72
Discharge: HOME | End: 2022-08-27
Admitting: NURSE PRACTITIONER

## 2022-08-27 DIAGNOSIS — M54.16 LUMBAR RADICULOPATHY: ICD-10-CM

## 2022-08-27 PROCEDURE — 72148 MRI LUMBAR SPINE W/O DYE: CPT

## 2022-09-22 ENCOUNTER — HOSPITAL ENCOUNTER (OUTPATIENT)
Dept: GENERAL RADIOLOGY | Facility: HOSPITAL | Age: 72
Discharge: HOME | End: 2022-09-22
Admitting: NURSE PRACTITIONER

## 2022-09-22 ENCOUNTER — OFFICE VISIT (OUTPATIENT)
Dept: FAMILY MEDICINE CLINIC | Facility: CLINIC | Age: 72
End: 2022-09-22

## 2022-09-22 VITALS
BODY MASS INDEX: 21.68 KG/M2 | HEART RATE: 70 BPM | WEIGHT: 127 LBS | TEMPERATURE: 98.6 F | SYSTOLIC BLOOD PRESSURE: 140 MMHG | OXYGEN SATURATION: 97 % | DIASTOLIC BLOOD PRESSURE: 82 MMHG | HEIGHT: 64 IN | RESPIRATION RATE: 22 BRPM

## 2022-09-22 DIAGNOSIS — Z00.00 WELLNESS EXAMINATION: ICD-10-CM

## 2022-09-22 DIAGNOSIS — E78.5 HYPERLIPIDEMIA, UNSPECIFIED HYPERLIPIDEMIA TYPE: ICD-10-CM

## 2022-09-22 DIAGNOSIS — M54.16 LUMBAR RADICULOPATHY: ICD-10-CM

## 2022-09-22 DIAGNOSIS — M81.0 OSTEOPOROSIS, UNSPECIFIED OSTEOPOROSIS TYPE, UNSPECIFIED PATHOLOGICAL FRACTURE PRESENCE: ICD-10-CM

## 2022-09-22 DIAGNOSIS — Z13.820 ENCOUNTER FOR OSTEOPOROSIS SCREENING IN ASYMPTOMATIC POSTMENOPAUSAL PATIENT: ICD-10-CM

## 2022-09-22 DIAGNOSIS — Z78.0 ENCOUNTER FOR OSTEOPOROSIS SCREENING IN ASYMPTOMATIC POSTMENOPAUSAL PATIENT: ICD-10-CM

## 2022-09-22 DIAGNOSIS — Z12.31 ENCOUNTER FOR SCREENING MAMMOGRAM FOR MALIGNANT NEOPLASM OF BREAST: Primary | ICD-10-CM

## 2022-09-22 DIAGNOSIS — I10 HYPERTENSION, UNSPECIFIED TYPE: ICD-10-CM

## 2022-09-22 PROCEDURE — 1126F AMNT PAIN NOTED NONE PRSNT: CPT | Performed by: STUDENT IN AN ORGANIZED HEALTH CARE EDUCATION/TRAINING PROGRAM

## 2022-09-22 PROCEDURE — G0439 PPPS, SUBSEQ VISIT: HCPCS | Performed by: STUDENT IN AN ORGANIZED HEALTH CARE EDUCATION/TRAINING PROGRAM

## 2022-09-22 PROCEDURE — 1170F FXNL STATUS ASSESSED: CPT | Performed by: STUDENT IN AN ORGANIZED HEALTH CARE EDUCATION/TRAINING PROGRAM

## 2022-09-22 PROCEDURE — 72114 X-RAY EXAM L-S SPINE BENDING: CPT

## 2022-09-22 PROCEDURE — 1160F RVW MEDS BY RX/DR IN RCRD: CPT | Performed by: STUDENT IN AN ORGANIZED HEALTH CARE EDUCATION/TRAINING PROGRAM

## 2022-09-22 NOTE — PROGRESS NOTES
The ABCs of the Annual Wellness Visit  Subsequent Medicare Wellness Visit    Chief Complaint   Patient presents with   • Medicare Wellness-subsequent     Annual visit       Subjective    History of Present Illness:  Carleen Payne is a 72 y.o. female who presents for a Subsequent Medicare Wellness Visit.    The following portions of the patient's history were reviewed and   updated as appropriate: allergies, current medications, past family history, past medical history, past social history, past surgical history and problem list.    Compared to one year ago, the patient feels her physical   health is the same.    Compared to one year ago, the patient feels her mental   health is the same.    Recent Hospitalizations:  She was not admitted to the hospital during the last year.       Current Medical Providers:  Patient Care Team:  Iona Mcclendon DO as PCP - General (Family Medicine)  Priyanka Guardado DO as Referring Physician (Family Medicine)  Herve Philippe MD as Consulting Physician (Pain Medicine)    Outpatient Medications Prior to Visit   Medication Sig Dispense Refill   • atorvastatin (Lipitor) 10 MG tablet Take 1 tablet by mouth Daily. 90 tablet 3   • Calcium 150 MG tablet Take 150 mg by mouth Daily.     • Cholecalciferol (VITAMIN D) 1000 UNITS tablet Take 1,000 Units by mouth Daily.     • EPINEPHrine (EPIPEN) 0.3 MG/0.3ML solution auto-injector injection INJECT INTRAMUSCULARLY AS NEEDED FOR ALLERGIC REACTION     • loratadine (Claritin) 10 MG tablet Take 1 tablet by mouth Daily. 90 tablet 3   • losartan (Cozaar) 50 MG tablet Take 1 tablet by mouth Daily. 90 tablet 3   • pregabalin (LYRICA) 50 MG capsule Take 1 capsule by mouth Every Night. 30 capsule 1   • metoprolol succinate XL (TOPROL-XL) 50 MG 24 hr tablet Take 1 tablet by mouth Daily. 90 tablet 3     No facility-administered medications prior to visit.       No opioid medication identified on active medication list. I have reviewed chart for other  "potential  high risk medication/s and harmful drug interactions in the elderly.          Aspirin is not on active medication list.  Aspirin use is not indicated based on review of current medical condition/s. Risk of harm outweighs potential benefits.  .    Patient Active Problem List   Diagnosis   • Lumbar radiculopathy   • Right sided sciatica   • Leg pain   • Lumbar stenosis with neurogenic claudication   • History of compression fracture of vertebral column   • Osteoporosis   • Spondylolisthesis of lumbar region   • DDD (degenerative disc disease), lumbar   • Mild cognitive impairment   • Impetigo   • Preseptal cellulitis of right lower eyelid   • Dermatitis   • Edema of left eyelid     Advance Care Planning  Advance Directive is not on file.  ACP discussion was held with the patient during this visit. Patient does not have an advance directive, information provided.          Objective    Vitals:    09/22/22 1029   BP: 140/82   Pulse: 70   Resp: 22   Temp: 98.6 °F (37 °C)   SpO2: 97%   Weight: 57.6 kg (127 lb)   Height: 162.6 cm (64\")   PainSc: 0-No pain     Estimated body mass index is 21.8 kg/m² as calculated from the following:    Height as of this encounter: 162.6 cm (64\").    Weight as of this encounter: 57.6 kg (127 lb).    BMI is within normal parameters. No other follow-up for BMI required.      Does the patient have evidence of cognitive impairment? No    Physical Exam  Constitutional:       General: She is not in acute distress.     Appearance: Normal appearance.   HENT:      Head: Normocephalic and atraumatic.   Eyes:      Extraocular Movements: Extraocular movements intact.   Cardiovascular:      Rate and Rhythm: Normal rate and regular rhythm.      Heart sounds: No murmur heard.  Pulmonary:      Effort: Pulmonary effort is normal. No respiratory distress.      Breath sounds: Normal breath sounds.   Musculoskeletal:         General: No swelling.      Cervical back: Normal range of motion.   Skin:     " Findings: No rash.   Neurological:      General: No focal deficit present.      Mental Status: She is alert.   Psychiatric:         Mood and Affect: Mood normal.                 HEALTH RISK ASSESSMENT    Smoking Status:  Social History     Tobacco Use   Smoking Status Never Smoker   Smokeless Tobacco Never Used     Alcohol Consumption:  Social History     Substance and Sexual Activity   Alcohol Use No     Fall Risk Screen:    LENA Fall Risk Assessment was completed, and patient is at LOW risk for falls.Assessment completed on:9/22/2022    Depression Screening:  PHQ-2/PHQ-9 Depression Screening 9/22/2022   Retired PHQ-9 Total Score -   Retired Total Score -   Little Interest or Pleasure in Doing Things 0-->not at all   Feeling Down, Depressed or Hopeless 0-->not at all   Trouble Falling or Staying Asleep, or Sleeping Too Much -   Feeling Tired or Having Little Energy -   Poor Appetite or Overeating -   Feeling Bad about Yourself - or that You are a Failure or Have Let Yourself or Your Family Down -   Moving or Speaking So Slowly that Other People Could Have Noticed? Or the Opposite - Being So Fidgety -   Thoughts that You Would be Better Off Dead or of Hurting Yourself in Some Way -   PHQ-9: Brief Depression Severity Measure Score 0       Health Habits and Functional and Cognitive Screening:  Functional & Cognitive Status 9/22/2022   Do you have difficulty preparing food and eating? No   Do you have difficulty bathing yourself, getting dressed or grooming yourself? No   Do you have difficulty using the toilet? No   Do you have difficulty moving around from place to place? No   Do you have trouble with steps or getting out of a bed or a chair? No   Current Diet Unhealthy Diet   Dental Exam Up to date   Eye Exam Up to date   Exercise (times per week) 4 times per week   Current Exercises Include Gardening   Current Exercise Activities Include -   Do you need help using the phone?  No   Are you deaf or do you have  serious difficulty hearing?  No   Do you need help with transportation? No   Do you need help shopping? No   Do you need help preparing meals?  No   Do you need help with housework?  No   Do you need help with laundry? No   Do you need help taking your medications? No   Do you need help managing money? No   Do you ever drive or ride in a car without wearing a seat belt? No   Have you felt unusual stress, anger or loneliness in the last month? -   Who do you live with? -   If you need help, do you have trouble finding someone available to you? -   Have you been bothered in the last four weeks by sexual problems? -   Do you have difficulty concentrating, remembering or making decisions? -       Age-appropriate Screening Schedule:  Refer to the list below for future screening recommendations based on patient's age, sex and/or medical conditions. Orders for these recommended tests are listed in the plan section. The patient has been provided with a written plan.    Health Maintenance   Topic Date Due   • ZOSTER VACCINE (2 of 2) 11/03/2013   • DXA SCAN  08/14/2021   • LIPID PANEL  09/20/2022   • INFLUENZA VACCINE  10/01/2022   • MAMMOGRAM  11/30/2023   • PAP SMEAR  12/02/2023   • TDAP/TD VACCINES (2 - Td or Tdap) 09/21/2024              Assessment & Plan   CMS Preventative Services Quick Reference  Risk Factors Identified During Encounter  Cardiovascular Disease  The above risks/problems have been discussed with the patient.  Follow up actions/plans if indicated are seen below in the Assessment/Plan Section.  Pertinent information has been shared with the patient in the After Visit Summary.    Diagnoses and all orders for this visit:    1. Encounter for screening mammogram for malignant neoplasm of breast (Primary)  -     Mammo screening digital tomosynthesis bilateral w CAD; Future    2. Encounter for osteoporosis screening in asymptomatic postmenopausal patient  -     DEXA Bone Density Axial; Future    3. Hypertension,  unspecified type  -     CBC (No Diff); Future  -     Comprehensive Metabolic Panel; Future    4. Wellness examination  -     Hemoglobin A1c; Future    5. Hyperlipidemia, unspecified hyperlipidemia type  -     Lipid Panel; Future  -     TSH Rfx On Abnormal To Free T4; Future    6. Osteoporosis, unspecified osteoporosis type, unspecified pathological fracture presence  -     Vitamin B12; Future  -     Vitamin D 25 Hydroxy; Future        Follow Up:   Return in about 6 months (around 3/22/2023).     An After Visit Summary and PPPS were made available to the patient.      Annual exam  Colonoscopy up to date.  Mammogram: ordered.   Pap smear: aged out   dexa scan: ordered. recommend weight bearing exercise, 1200 mg calcium daily in diet or in over the counter supplement and 1000 units vitamin d daily.  a1c /lipid screening ordered.   covid vaccine: she declines.   Shingles vaccine: she declines   Pneumonia vaccine: she declines  tdap vaccine:she declines

## 2022-09-27 ENCOUNTER — HOSPITAL ENCOUNTER (OUTPATIENT)
Dept: BONE DENSITY | Facility: HOSPITAL | Age: 72
Discharge: HOME | End: 2022-09-27
Admitting: STUDENT IN AN ORGANIZED HEALTH CARE EDUCATION/TRAINING PROGRAM

## 2022-09-27 DIAGNOSIS — Z13.820 ENCOUNTER FOR OSTEOPOROSIS SCREENING IN ASYMPTOMATIC POSTMENOPAUSAL PATIENT: ICD-10-CM

## 2022-09-27 DIAGNOSIS — Z78.0 ENCOUNTER FOR OSTEOPOROSIS SCREENING IN ASYMPTOMATIC POSTMENOPAUSAL PATIENT: ICD-10-CM

## 2022-09-27 PROCEDURE — 77080 DXA BONE DENSITY AXIAL: CPT

## 2023-01-16 RX ORDER — LORATADINE 10 MG/1
TABLET ORAL
Qty: 90 TABLET | Refills: 3 | Status: SHIPPED | OUTPATIENT
Start: 2023-01-16

## 2023-01-16 NOTE — TELEPHONE ENCOUNTER
Rx Refill Note  Requested Prescriptions     Pending Prescriptions Disp Refills   • loratadine (CLARITIN) 10 MG tablet [Pharmacy Med Name: LORATADINE TABS-OTC 10MG] 90 tablet 3     Sig: TAKE 1 TABLET DAILY      Last office visit with prescribing clinician: 9/22/2022   Last telemedicine visit with prescribing clinician: 3/22/2023   Next office visit with prescribing clinician: 3/22/2023                         Would you like a call back once the refill request has been completed: [] Yes [] No    If the office needs to give you a call back, can they leave a voicemail: [] Yes [] No    Janae Anthony  01/16/23, 08:39 EST

## 2023-01-17 ENCOUNTER — OFFICE VISIT (OUTPATIENT)
Dept: PAIN MEDICINE | Facility: CLINIC | Age: 73
End: 2023-01-17
Payer: MEDICARE

## 2023-01-17 DIAGNOSIS — M43.16 SPONDYLOLISTHESIS OF LUMBAR REGION: ICD-10-CM

## 2023-01-17 DIAGNOSIS — M48.062 LUMBAR STENOSIS WITH NEUROGENIC CLAUDICATION: ICD-10-CM

## 2023-01-17 DIAGNOSIS — M51.36 DDD (DEGENERATIVE DISC DISEASE), LUMBAR: ICD-10-CM

## 2023-01-17 DIAGNOSIS — Z87.81 HISTORY OF COMPRESSION FRACTURE OF VERTEBRAL COLUMN: ICD-10-CM

## 2023-01-17 DIAGNOSIS — M81.0 OSTEOPOROSIS, UNSPECIFIED OSTEOPOROSIS TYPE, UNSPECIFIED PATHOLOGICAL FRACTURE PRESENCE: ICD-10-CM

## 2023-01-17 DIAGNOSIS — M54.16 LUMBAR RADICULOPATHY: ICD-10-CM

## 2023-01-17 PROCEDURE — 99441 PR PHYS/QHP TELEPHONE EVALUATION 5-10 MIN: CPT | Performed by: NURSE PRACTITIONER

## 2023-01-17 NOTE — PROGRESS NOTES
"Type of Service: Consult via telemedicine  Basis for telemedicine: COVID-19 pandemic/federally declared state of public health emergency  Mode of transmission: Telephone (patient's preference).  Telemedicine Provider: RAYMOND Vance  Location of Physician: Office   Patient location: Home   You have chosen to receive care through a telephone visit today. Do you consent to use a telephone visit for your medical care today?   Yes       Chief Complaint: \"Lower back and right leg pain.\"       History of Present Illness:   Patient: Ms. Carleen Payne, 72 y.o. female originally referred by Dr. Priyanka Guardado in consultation for intractable chronic lower back pain. Patient reports a longstanding history of lower back pain, which began after a fall.  She was last seen on August 24, 2022, when she underwent repeat right L4-L5 and right L5-S1 transforaminal epidural steroid injection, which has provided her with 100% pain relief of her right lower extremity symptoms lasting almost 5 months, she reports recurrence of her pain over the past week.  Prior to that on February 28, 2022, she underwent repeat right L4-L5 and right L5-S1 transforaminal epidural steroid injections, from which she reported experiencing about 60% pain relief and functional improvement lasting a few months.   She request consultation today for reevaluation of her chronic lower back and right lower extremity pain.   Pain description: constant pain with intermittent exacerbation, described as aching and burning sensation.   Radiation of pain: The lower back pain radiates into the posterior aspect of the right thigh, right calf to the ankle.    Pain intensity today: 7/10  Average pain intensity last week: 6/10  Pain intensity ranges from: 4/10 to 9/10  Aggravating factors: Pain increases with lying down, standing and walking.   Alleviating factors: Pain decreases with analgesics, sitting, and at times walking.    Associated symptoms:   Patient denies " numbness or weakness in the lower extremities. She reports pain in the right lower extremity.   Patient denies any new bladder or bowel problems.   Patient denies difficulties with her balance or recent falls    Review of previous therapies and additional medical records:  Carleen Payne has already failed the following measures, including:   Conservative measures: oral analgesics and physical therapy   Interventional measures: 02/26/2020: DxTx right L4-L5 and right L5-S1 TESI  08/17/2020: right L4-L5 and right L5-S1 TESI  05/12/2021: right L4-L5 and right L5-S1 TESI  09/22/2021: right L4-L5 and right L5-S1 TESI  02/28/2022: right L4-L5 and right L5-S1 TESI  Former patient of Dr. Talbert. She underwent epidural injections (RT L4-L5 transforaminals x4) with significant relief  Surgical measures: No history of lumbar spine surgery  Carleen Payne underwent neurosurgical consultation with Dr. Gomes on 06/16/2017, and was found not to be a surgical candidate.  Carleen Payne is a healthy adult other than her chronic pain and HTN.  In terms of current analgesics, Carleen Payne takes: aspirin PRN  I have reviewed Anatoly Report is consistent to medication reconciliation.     Global Pain Scale 06-20 2019 02-19 2020 03-23 2020 02-24 2022           Pain  1  12  10  10           Feelings  0  0  0  0           Clinical outcomes  0  0  2  2           Activities  0  8  0  2           GPS Total:  3  20  12  14              Review of New Diagnostic Studies:  MRI of the lumbar spine without contrast 8/28/2022: Imaging was reviewed.  Old T12 compression fracture with 50% loss of height.  Anterolisthesis of L4 on L5.  T11-12: Posterior disc bulge with bowing of the posterior wall of the superior endplate of the T12 vertebral body.  Borderline spinal stenosis.  T12 1: Small left paracentral disc protrusion with mild facet arthritis.  L1-L2: Mild facet hypertrophy without significant spinal canal neuroforaminal stenosis.  L2-L3:  Facet hypertrophy without significant spinal canal neuroforaminal stenosis.  L3-L4: Facet hypertrophy with out significant spinal canal neuroforaminal stenosis.  L4-L5: 6 mm of anterolisthesis of L4 on L5, severe facet hypertrophy creating moderate to severe spinal stenosis, with moderate bilateral neural foraminal stenosis.  L4-L5: Disc bulge with severe facet hypertrophy and mild bilateral neuroforaminal stenosis, mild spinal canal stenosis.  Lumbar spine x-ray flexion-extension views 9/22/2022: Grade 1 anterior listhesis of L4 on L5 and mild anterior listhesis of L5 on S1.  There is dynamic instability most seen in flexion view.  Multilevel spondylosis most significant at L3-L4 and L4-5.    Review of Diagnostic Studies:  Lumbar spine x-rays and flexion-extension views: There is again late grade 1 or early grade 2 anterior subluxation of L4 on L5 similar to the prior study. No more than 2 mm of anterior subluxation is seen from extension to flexion. No significant movement is seen at the remaining levels. Late grade 1 or early grade 2 anterior subluxation of L4 on L5, with minimal change in subluxation from flexion to extension. No  other significant lumbar disease is seen elsewhere.  MRI of the lumbar spine without contrast on November 2, 2016: Radiology report. Mild disc desiccation at all levels. There are sagittal annular fissures at the lower 4 lumbar levels.  Axial imaging:  T12-L1: Small posterior left paracentral disc protrusion. Mild effacement of the ventral leftward thecal sac without significant spinal canal or NF stenosis   L1-L2: negative  L2-L3 and L3-L4: Mild lateral disc bulging.  No canal or NF stenosis  L4-L5: Moderate facet hypertrophy associated with bilateral joint effusions and a grade 1 anterolisthesis of L4 on L5. Mild circumferential disc bulge with mild superior pseudo-protrusion of disc material.  Mild narrowing of the neuroforamina at the thecal sac. There is significant narrowing of  the superior right subarticular zone with effacement of the right L5 nerve root.  There is mild to moderate narrowing of the superior left subarticular zone with mild effacement of the left L5 nerve root sheath.  L5-S1: Mild disc space narrowing and mild facet hypertrophy. Mild right and mild to moderate left subarticular zone narrowing just above the level of the pedicles.  Mild effacement of the left S1 nerve root.  There may be slight effacement of the right S1 nerve root.  No narrowing of the thecal sac.  Mild neuroforaminal stenosis    Review of Systems   Musculoskeletal: Positive for arthralgias and back pain.   Allergic/Immunologic: Positive for environmental allergies.   All other systems reviewed and are negative.        Patient Active Problem List   Diagnosis   • Lumbar radiculopathy   • Right sided sciatica   • Leg pain   • Lumbar stenosis with neurogenic claudication   • History of compression fracture of vertebral column   • Osteoporosis   • Spondylolisthesis of lumbar region   • DDD (degenerative disc disease), lumbar   • Mild cognitive impairment   • Impetigo   • Preseptal cellulitis of right lower eyelid   • Dermatitis   • Edema of left eyelid       Past Medical History:   Diagnosis Date   • Hyperlipidemia    • Hypertension    • Osteoporosis          Past Surgical History:   Procedure Laterality Date   • NO PAST SURGERIES           Family History   Problem Relation Age of Onset   • Hypertension Mother    • Hypertension Father    • Stroke Father    • Heart attack Brother    • Thyroid disease Daughter    • Stroke Maternal Grandmother    • Cancer Maternal Grandfather    • Cancer Paternal Grandmother    • Ovarian cancer Paternal Grandmother         60'S   • Arthritis Brother    • Ovarian cancer Paternal Aunt         60'S   • Ovarian cancer Cousin         PATERNAL 1ST COUSIN, 40'S   • Ovarian cancer Cousin         PATERNAL 1ST COUSIN, 60'S   • Breast cancer Neg Hx          Social History     Socioeconomic  History   • Marital status:    Tobacco Use   • Smoking status: Never   • Smokeless tobacco: Never   Vaping Use   • Vaping Use: Never used   Substance and Sexual Activity   • Alcohol use: No   • Drug use: No   • Sexual activity: Yes     Partners: Male           Current Outpatient Medications:   •  atorvastatin (Lipitor) 10 MG tablet, Take 1 tablet by mouth Daily., Disp: 90 tablet, Rfl: 3  •  Calcium 150 MG tablet, Take 150 mg by mouth Daily., Disp: , Rfl:   •  Cholecalciferol (VITAMIN D) 1000 UNITS tablet, Take 1,000 Units by mouth Daily., Disp: , Rfl:   •  EPINEPHrine (EPIPEN) 0.3 MG/0.3ML solution auto-injector injection, INJECT INTRAMUSCULARLY AS NEEDED FOR ALLERGIC REACTION, Disp: , Rfl:   •  loratadine (CLARITIN) 10 MG tablet, TAKE 1 TABLET DAILY, Disp: 90 tablet, Rfl: 3  •  losartan (Cozaar) 50 MG tablet, Take 1 tablet by mouth Daily., Disp: 90 tablet, Rfl: 3  •  metoprolol succinate XL (TOPROL-XL) 50 MG 24 hr tablet, Take 1 tablet by mouth Daily., Disp: 90 tablet, Rfl: 3  •  pregabalin (LYRICA) 50 MG capsule, Take 1 capsule by mouth Every Night., Disp: 30 capsule, Rfl: 1      Allergies   Allergen Reactions   • Erythromycin Base Nausea And Vomiting   • Gabapentin Swelling     Body swelling         There were no vitals taken for this visit.     Due to the constraints of the telemedicine format, no physical exam was performed      Physical Exam: (Previous PE from last office visit)  Constitutional: Patient is oriented to person, place, and time. Patient appears well-developed and well-nourished.   HEENT: Head: Normocephalic and atraumatic. Eyes: Conjunctivae and lids are normal. Pupils: Equal, round, reactive to light.   Neck: Trachea normal. Neck supple. No JVD present.   Pulmonary Respiratory effort: No increased work of breathing or signs of respiratory distress.   Peripheral vascular exam: Femoral: right 2+, left 2+. Posterior tibialis: right 2+ and left 2+. Dorsalis pedis: right 2+ and left 2+. No  edema.   Musculoskeletal   Gait and station: Gait evaluation demonstrated a normal gait   Lumbar spine: Passive and active range of motion are full and without pain. Extension, flexion, lateral flexion, rotation of the lumbar spine did not increase or reproduce pain. Lumbar facet joint loading maneuvers are negative.   Rodrigo test and Gaenslen's test are negative   Piriformis maneuvers are negative   Palpation of the bilateral ischial tuberosities, unrevealing   Palpation of the bilateral greater trochanter, unrevealing   Examination of the Iliotibial band: unrevealing   Hip joints: The range of motion of the hip joints is full and without pain   Neurological: Patient is alert and oriented to person, place, and time. Speech: speech is normal. Cortical function: Normal mental status.   Reflex Scores:  Right patellar: 2+  Left patellar: 2+  Right Achilles: 2+  Left Achilles: 2+  Motor strength: 5/5  Motor Tone: normal tone.   Involuntary movements: none.   Superficial/Primitive Reflexes: primitive reflexes were absent.   Right Carcamo: absent  Left Carcamo: absent  Right ankle clonus: absent  Left ankle clonus: absent   Negative long tract signs. Straight leg raising test on the right elicits her posterior thigh pain. Femoral stretch sign is negative.   Sensation: No sensory loss. Sensory exam: intact to light touch, intact pain and temperature sensation, intact vibration sensation and normal proprioception.   Coordination: Normal finger to nose and heel to shin. Normal balance and negative Romberg's sign   Skin and subcutaneous tissue: Skin is warm and intact. No rash noted. No cyanosis.   Psychiatric: Judgment and insight: Normal. Orientation to person, place and time: Normal. Recent and remote memory: Intact. Mood and affect: Normal.       ASSESSMENT:   1. Lumbar stenosis with neurogenic claudication    2. Lumbar radiculopathy    3. Spondylolisthesis of lumbar region    4. DDD (degenerative disc disease), lumbar     5. History of compression fracture of vertebral column    6. Osteoporosis, unspecified osteoporosis type, unspecified pathological fracture presence        PLAN/MEDICAL DECISION MAKING: Ms. Carleen Payne, 72 y.o. female reports a longstanding history of lower back pain.  Most recent MRI of the lumbar spine without contrast in revealed multilevel degenerative disc disease, at L4-L5 there is severe facet hypertrophy, a grade 1 anterior listhesis of L4 on L5, this is contributing to moderate to severe spinal canal stenosis and moderate bilateral neuroforaminal stenosis.  Lumbar spine x-rays and flexion-extension views revealed instability seen L4-L5, most in flexion view.  She has responded exceptionally well to previous transforaminal epidurals, as referenced above.  She continues to exercise, and has participated in physical therapy in the past.  I had a lengthy conversation with Ms. Carleen Payne regarding her chronic pain condition and potential therapeutic options including risks, benefits, alternative therapies, to name a few. Patient has failed to obtain pain relief with conservative measures, as referenced above. Patient experiences significant pain relief from interventional pain management measures, as referenced above. I have reviewed all available patient's medical records as well as previous therapies as referenced above.   Therefore, I have proposed the following plan:  1. Pharmacological measures: Reviewed. Discussed.   A. Patient takes aspirin PRN  2. Interventional pain management measures: Patient will be scheduled for repeat right L4-L5 and right L5-S1 transforaminal epidural steroid injections. We may repeat epidurals depending on patient's outcome, or facilitate NS follow-up with Dr Gomes.   3. Long-term rehabilitation efforts:  A. The patient does not have a history of falls. I did complete a risk assessment for falls  B. Patient will start a comprehensive physical therapy program for  reconditioning, therapeutic exercise, upper body strengthening/posture correction, core strengthening, gait and balance training, neurodynamics, myofascial release, cupping and dry needling if pain recurs  C. Start an exercise program such as yoga, Pilates, Juan Daniel Chi and water therapy  D. Contrast therapy: Apply ice-packs for 15-20 minutes, followed by heating pads for 15-20 minutes to affected area   5. The patient has been instructed to contact my office with any questions or difficulties. The patient understands the plan and agrees to proceed accordingly.    A phone visit was used to complete this visit. Total time of discussion was 10 minutes.             Patient Care Team:  Iona Mcclendon DO as PCP - General (Family Medicine)  Priyanka Guardado DO as Referring Physician (Family Medicine)  Herve Philippe MD as Consulting Physician (Pain Medicine)     No orders of the defined types were placed in this encounter.        Future Appointments   Date Time Provider Department Center   1/17/2023  1:00 PM Flores Stratton APRN [facility]   3/22/2023 11:30 AM Iona Mcclendon DO [facility]   9/27/2023 10:30 AM Iona Mcclendon DO [facility]         RAYMOND Lorenzana

## 2023-01-25 ENCOUNTER — OUTSIDE FACILITY SERVICE (OUTPATIENT)
Dept: PAIN MEDICINE | Facility: CLINIC | Age: 73
End: 2023-01-25
Payer: MEDICARE

## 2023-01-25 PROCEDURE — 64484 NJX AA&/STRD TFRM EPI L/S EA: CPT | Performed by: ANESTHESIOLOGY

## 2023-01-25 PROCEDURE — 64483 NJX AA&/STRD TFRM EPI L/S 1: CPT | Performed by: ANESTHESIOLOGY

## 2023-01-31 DIAGNOSIS — E78.2 MIXED HYPERLIPIDEMIA: ICD-10-CM

## 2023-01-31 DIAGNOSIS — I10 ESSENTIAL HYPERTENSION: ICD-10-CM

## 2023-01-31 RX ORDER — ATORVASTATIN CALCIUM 10 MG/1
TABLET, FILM COATED ORAL
Qty: 90 TABLET | Refills: 0 | Status: SHIPPED | OUTPATIENT
Start: 2023-01-31

## 2023-01-31 RX ORDER — METOPROLOL SUCCINATE 50 MG/1
TABLET, EXTENDED RELEASE ORAL
Qty: 90 TABLET | Refills: 0 | Status: SHIPPED | OUTPATIENT
Start: 2023-01-31

## 2023-01-31 RX ORDER — LOSARTAN POTASSIUM 50 MG/1
TABLET ORAL
Qty: 90 TABLET | Refills: 0 | Status: SHIPPED | OUTPATIENT
Start: 2023-01-31

## 2023-03-28 NOTE — TELEPHONE ENCOUNTER
Pt called stating she had taken her first dose of steroids and vomited 3 times. Dr Stevenson consulted and he is sending in Zofran. Pt was instructed to take Zofran 30 minutes before she takes her next steroid dose. Humble MCDERMOTT.   Vital Signs

## 2023-04-11 ENCOUNTER — TELEPHONE (OUTPATIENT)
Dept: URGENT CARE | Facility: CLINIC | Age: 73
End: 2023-04-11
Payer: MEDICARE

## 2023-04-26 ENCOUNTER — OFFICE VISIT (OUTPATIENT)
Dept: INTERNAL MEDICINE | Facility: CLINIC | Age: 73
End: 2023-04-26
Payer: MEDICARE

## 2023-04-26 VITALS
OXYGEN SATURATION: 98 % | SYSTOLIC BLOOD PRESSURE: 116 MMHG | HEIGHT: 64 IN | BODY MASS INDEX: 21.65 KG/M2 | HEART RATE: 75 BPM | WEIGHT: 126.8 LBS | RESPIRATION RATE: 20 BRPM | TEMPERATURE: 98.9 F | DIASTOLIC BLOOD PRESSURE: 72 MMHG

## 2023-04-26 DIAGNOSIS — R11.2 NAUSEA VOMITING AND DIARRHEA: Primary | ICD-10-CM

## 2023-04-26 DIAGNOSIS — R19.7 NAUSEA VOMITING AND DIARRHEA: Primary | ICD-10-CM

## 2023-04-26 DIAGNOSIS — E78.5 HYPERLIPIDEMIA, UNSPECIFIED HYPERLIPIDEMIA TYPE: ICD-10-CM

## 2023-04-26 DIAGNOSIS — R53.83 OTHER FATIGUE: ICD-10-CM

## 2023-04-26 DIAGNOSIS — I10 PRIMARY HYPERTENSION: ICD-10-CM

## 2023-04-26 LAB
ALBUMIN SERPL-MCNC: 3.8 G/DL (ref 3.5–5.2)
ALBUMIN/GLOB SERPL: 1.7 G/DL
ALP SERPL-CCNC: 69 U/L (ref 39–117)
ALT SERPL W P-5'-P-CCNC: 22 U/L (ref 1–33)
ANION GAP SERPL CALCULATED.3IONS-SCNC: 9.5 MMOL/L (ref 5–15)
AST SERPL-CCNC: 27 U/L (ref 1–32)
BASOPHILS # BLD AUTO: 0.04 10*3/MM3 (ref 0–0.2)
BASOPHILS NFR BLD AUTO: 0.6 % (ref 0–1.5)
BILIRUB SERPL-MCNC: 0.3 MG/DL (ref 0–1.2)
BUN SERPL-MCNC: 12 MG/DL (ref 8–23)
BUN/CREAT SERPL: 15.2 (ref 7–25)
CALCIUM SPEC-SCNC: 9.8 MG/DL (ref 8.6–10.5)
CHLORIDE SERPL-SCNC: 109 MMOL/L (ref 98–107)
CHOLEST SERPL-MCNC: 133 MG/DL (ref 0–200)
CO2 SERPL-SCNC: 27.5 MMOL/L (ref 22–29)
CREAT SERPL-MCNC: 0.79 MG/DL (ref 0.57–1)
DEPRECATED RDW RBC AUTO: 42.5 FL (ref 37–54)
EGFRCR SERPLBLD CKD-EPI 2021: 80.6 ML/MIN/1.73
EOSINOPHIL # BLD AUTO: 0.18 10*3/MM3 (ref 0–0.4)
EOSINOPHIL NFR BLD AUTO: 2.5 % (ref 0.3–6.2)
ERYTHROCYTE [DISTWIDTH] IN BLOOD BY AUTOMATED COUNT: 12.6 % (ref 12.3–15.4)
GLOBULIN UR ELPH-MCNC: 2.3 GM/DL
GLUCOSE SERPL-MCNC: 90 MG/DL (ref 65–99)
HCT VFR BLD AUTO: 38.4 % (ref 34–46.6)
HDLC SERPL-MCNC: 43 MG/DL (ref 40–60)
HGB BLD-MCNC: 13.2 G/DL (ref 12–15.9)
IMM GRANULOCYTES # BLD AUTO: 0.02 10*3/MM3 (ref 0–0.05)
IMM GRANULOCYTES NFR BLD AUTO: 0.3 % (ref 0–0.5)
LDLC SERPL CALC-MCNC: 72 MG/DL (ref 0–100)
LDLC/HDLC SERPL: 1.65 {RATIO}
LYMPHOCYTES # BLD AUTO: 1.58 10*3/MM3 (ref 0.7–3.1)
LYMPHOCYTES NFR BLD AUTO: 21.9 % (ref 19.6–45.3)
MCH RBC QN AUTO: 32 PG (ref 26.6–33)
MCHC RBC AUTO-ENTMCNC: 34.4 G/DL (ref 31.5–35.7)
MCV RBC AUTO: 93 FL (ref 79–97)
MONOCYTES # BLD AUTO: 0.71 10*3/MM3 (ref 0.1–0.9)
MONOCYTES NFR BLD AUTO: 9.9 % (ref 5–12)
NEUTROPHILS NFR BLD AUTO: 4.67 10*3/MM3 (ref 1.7–7)
NEUTROPHILS NFR BLD AUTO: 64.8 % (ref 42.7–76)
NRBC BLD AUTO-RTO: 0.1 /100 WBC (ref 0–0.2)
PLATELET # BLD AUTO: 269 10*3/MM3 (ref 140–450)
PMV BLD AUTO: 10.7 FL (ref 6–12)
POTASSIUM SERPL-SCNC: 3.8 MMOL/L (ref 3.5–5.2)
PROT SERPL-MCNC: 6.1 G/DL (ref 6–8.5)
RBC # BLD AUTO: 4.13 10*6/MM3 (ref 3.77–5.28)
SODIUM SERPL-SCNC: 146 MMOL/L (ref 136–145)
TRIGL SERPL-MCNC: 95 MG/DL (ref 0–150)
TSH SERPL DL<=0.05 MIU/L-ACNC: 0.41 UIU/ML (ref 0.27–4.2)
VLDLC SERPL-MCNC: 18 MG/DL (ref 5–40)
WBC NRBC COR # BLD: 7.2 10*3/MM3 (ref 3.4–10.8)

## 2023-04-26 PROCEDURE — 85025 COMPLETE CBC W/AUTO DIFF WBC: CPT | Performed by: NURSE PRACTITIONER

## 2023-04-26 PROCEDURE — 1160F RVW MEDS BY RX/DR IN RCRD: CPT | Performed by: NURSE PRACTITIONER

## 2023-04-26 PROCEDURE — 1159F MED LIST DOCD IN RCRD: CPT | Performed by: NURSE PRACTITIONER

## 2023-04-26 PROCEDURE — 80053 COMPREHEN METABOLIC PANEL: CPT | Performed by: NURSE PRACTITIONER

## 2023-04-26 PROCEDURE — 99214 OFFICE O/P EST MOD 30 MIN: CPT | Performed by: NURSE PRACTITIONER

## 2023-04-26 PROCEDURE — 36415 COLL VENOUS BLD VENIPUNCTURE: CPT | Performed by: NURSE PRACTITIONER

## 2023-04-26 PROCEDURE — 80061 LIPID PANEL: CPT | Performed by: NURSE PRACTITIONER

## 2023-04-26 PROCEDURE — 84443 ASSAY THYROID STIM HORMONE: CPT | Performed by: NURSE PRACTITIONER

## 2023-04-26 NOTE — PROGRESS NOTES
"  New Patient Office Visit      Patient Name: Carleen Payne  : 1950   MRN: [id number]     Chief Complaint:    Chief Complaint   Patient presents with   • Establish Care   • Vomiting   • Diarrhea       History of Present Illness: Carleen Payne is a 72 y.o. female presents to clinic today to establish care.     Ms. Payne reports she is doing well at the current moment.    Diarrhea and emesis  The patient states that she has been experiencing intermittent episodes of diarrhea and emesis for the past month. The first episode lasted for 4 days at which time she went to urgent care. She was prescribed Zofran which helps sometimes. These episodes have occurred approximately 10 times within the last month. She will wake up in the morning and as soon as she starts eating, she realizes she is \"not good.\" The episodes last all day long and she usually sleeps the entire day other than when she is running to the bathroom. During these episodes she feels extreme fatigue. She has experienced an occasional bout of diarrhea or emesis before, but never persistent like this has been. She does mention that these episodes started occurring approximately 1 week after returning from a trip abroad to [facility] and [place]. She denies any stomach issues prior to her trip. She is unsure of any hematochezia or mucus in her stool because she flushes immediately. She describes her stool as \"pure liquid\" during an episode. She does experience abdominal pain with cramping, feeling like \"someone just punched you.\" She denies checking her temperature. She had a normal bowel movement yesterday. She denies any coffee ground emesis or burning sensation in her abdomen. She has not had an episode in 5 days. She does not have any problems urinating and admits to staying well hydrated.     Hyperlipidemia  She has a history of hyperlipidemia. She currently utilizes atorvastatin (Lipitor).    Hypertension  The patient has hypertension. Her blood " pressure is well controlled today at 116/72 mmHg and her heart rate is 75 beats per minute. She currently utilizes losartan and metoprolol succinate XL. She denies any history of heart disease or heart attacks.  She denies any chest pains or shortness of breath.    Health maintenance  She has not utilized any antibiotics recently. She notes her last colonoscopy was in 2019. She thinks polyps were discovered. She was advised to repeat it in 5 years. She had an endoscopy years ago. She denies any history of diabetes mellitus. She admits that her eating habits are not good. She carries an EpiPen due to an allergy to ISOTHIAZOLINONE CHLORIDE which causes a rash and swelling. The compound was discovered as an ingredient in her facial soap.    Subjective     Review of System: Review of Systems   Constitutional: Positive for fatigue.   Gastrointestinal: Positive for abdominal pain (with episodes (cramping)), blood in stool, diarrhea, nausea and vomiting.   Neurological: Positive for weakness (intermittent ).      I have reviewed the ROS documented by my clinical staff, updated appropriately and I agree. RAYMOND Rivera    Past Medical History:   Past Medical History:   Diagnosis Date   • Hyperlipidemia    • Hypertension    • Osteoporosis        Past Surgical History:   Past Surgical History:   Procedure Laterality Date   • NO PAST SURGERIES         Family History:   Family History   Problem Relation Age of Onset   • Hypertension Mother    • Hypertension Father    • Stroke Father    • Heart attack Brother    • Thyroid disease Daughter    • Stroke Maternal Grandmother    • Cancer Maternal Grandfather    • Cancer Paternal Grandmother    • Ovarian cancer Paternal Grandmother         60'S   • Arthritis Brother    • Ovarian cancer Paternal Aunt         60'S   • Ovarian cancer Cousin         PATERNAL 1ST COUSIN, 40'S   • Ovarian cancer Cousin         PATERNAL 1ST COUSIN, 60'S   • Breast cancer Neg Hx        Social History:  "  Social History     Socioeconomic History   • Marital status:    Tobacco Use   • Smoking status: Never     Passive exposure: Never   • Smokeless tobacco: Never   Vaping Use   • Vaping Use: Never used   Substance and Sexual Activity   • Alcohol use: No   • Drug use: No   • Sexual activity: Yes     Partners: Male       Medications:     Current Outpatient Medications:   •  atorvastatin (LIPITOR) 10 MG tablet, TAKE 1 TABLET DAILY, Disp: 90 tablet, Rfl: 0  •  Calcium 150 MG tablet, Take 150 mg by mouth Daily., Disp: , Rfl:   •  Cholecalciferol (VITAMIN D) 1000 UNITS tablet, Take 1 tablet by mouth Daily., Disp: , Rfl:   •  EPINEPHrine (EPIPEN) 0.3 MG/0.3ML solution auto-injector injection, INJECT INTRAMUSCULARLY AS NEEDED FOR ALLERGIC REACTION, Disp: , Rfl:   •  loratadine (CLARITIN) 10 MG tablet, TAKE 1 TABLET DAILY, Disp: 90 tablet, Rfl: 3  •  losartan (COZAAR) 50 MG tablet, TAKE 1 TABLET DAILY, Disp: 90 tablet, Rfl: 0  •  metoprolol succinate XL (TOPROL-XL) 50 MG 24 hr tablet, TAKE 1 TABLET DAILY, Disp: 90 tablet, Rfl: 0  •  pregabalin (LYRICA) 50 MG capsule, Take 1 capsule by mouth Every Night., Disp: 30 capsule, Rfl: 1    Allergies:   Allergies   Allergen Reactions   • Erythromycin Base Nausea And Vomiting   • Gabapentin Swelling     Body swelling   • Isothiazolinone Chloride Rash       Objective     Physical Exam:   Vital Signs:   Vitals:    04/26/23 0835   BP: 116/72   Pulse: 75   Resp: 20   Temp: 98.9 °F (37.2 °C)   SpO2: 98%   Weight: 57.5 kg (126 lb 12.8 oz)   Height: 162.6 cm (64.02\")   PainSc: 0-No pain     Body mass index is 21.75 kg/m². BMI is within normal parameters. No other follow-up for BMI required.      Physical Exam  Constitutional:       General: She is not in acute distress.     Appearance: She is not ill-appearing.   HENT:      Head: Normocephalic.   Cardiovascular:      Rate and Rhythm: Normal rate and regular rhythm.      Heart sounds: Normal heart sounds. No murmur heard.  Pulmonary:    "   Effort: Pulmonary effort is normal.      Breath sounds: Normal breath sounds.   Abdominal:      General: Bowel sounds are normal.      Tenderness: There is no abdominal tenderness. There is no right CVA tenderness or left CVA tenderness.   Neurological:      General: No focal deficit present.      Mental Status: She is oriented to person, place, and time.   Psychiatric:         Mood and Affect: Mood normal.         Assessment / Plan      Assessment/Plan:   Diagnoses and all orders for this visit:    1. Nausea vomiting and diarrhea (Primary)  -     Comprehensive Metabolic Panel; Future  -     CBC & Differential; Future  -     POC Occult Blood X 3, Stool; Future  -     H. Pylori Antigen, Stool - Stool, Per Rectum; Future  -     Clostridioides difficile Toxin, PCR - Stool, Per Rectum; Future  -     Fecal Leukocytes - Stool, Per Rectum; Future  -     Giardia Antigen - Stool, Per Rectum; Future  -     Stool Culture With Yersinia - Stool, Per Rectum; Future  -     Ova & Parasite Examination - Stool, Per Rectum; Future  -     Rotavirus Antigen, Stool - Stool, Per Rectum; Future  -     Comprehensive Metabolic Panel  -     CBC & Differential  -     Rotavirus Antigen, Stool - Stool, Per Rectum    2. Other fatigue  -     Comprehensive Metabolic Panel; Future  -     CBC & Differential; Future  -     TSH Rfx On Abnormal To Free T4; Future  -     Comprehensive Metabolic Panel  -     CBC & Differential  -     TSH Rfx On Abnormal To Free T4    3. Hyperlipidemia, unspecified hyperlipidemia type  -     Lipid panel; Future  -     Lipid panel    4. Primary hypertension       1. Nausea, emesis, and diarrhea   The patient has been experiencing intermittent episodes of diarrhea and vomiting since approximately 1 week after returning home from a trip abroad to [place]. She will complete lab work today. An order has been placed for POC occult blood stool test, H. pylori antigen, Clostridioides difficile Toxin, PCR - stool, fecal  leukocytes - stool, Giardia antigen, stool culture with Yersinia, Ova & parasite examination - stool, rotavirus antigen - stool, CMP, and CBC.    2. Fatigue  She will complete lab work today. An order has been placed for CMP, CBC, and TSH.    3. Hyperlipidemia  She will continue current atorvastatin as prescribed. She will complete lab work today. An order has been placed for CMP, CBC, and lipid panel.    4. Hypertension  She will complete lab work today. An order has been placed for CMP and CBC. Her blood pressure is well controlled today at 116/72 mmHg and her heart rate is 75 beats per minute. She will continue current regimen of losartan and metoprolol succinate XL.       I explained and discussed patient's condition and plan of care.  Discussed when to follow-up.  Discussed possible red flags and how to follow-up with those.  Viewed patient's medications and discussed common side effects. Patient to continue current medications as advised.  Be compliant with medications. Patient to let me know if she has any changes in health, does not tolerate medication, or any future concerns about treatment. Patient verbalized understanding and agreement with plan of care.     Follow Up: Additional follow-up may be needed pending labs or no improvement in symptoms.   Return in about 5 months (around 9/25/2023) for Annual.    RAYMOND Rivera  [facility]    Transcribed from ambient dictation for RAYMOND Rivera by Tete Casanova.  04/26/23   12:00 EDT    Patient or patient representative verbalized consent to the visit recording.  I have personally performed the services described in this document as transcribed by the above individual, and it is both accurate and complete.

## 2023-04-28 ENCOUNTER — LAB (OUTPATIENT)
Dept: LAB | Facility: HOSPITAL | Age: 73
End: 2023-04-28
Payer: MEDICARE

## 2023-04-28 DIAGNOSIS — R11.2 NAUSEA VOMITING AND DIARRHEA: ICD-10-CM

## 2023-04-28 DIAGNOSIS — R19.7 NAUSEA VOMITING AND DIARRHEA: ICD-10-CM

## 2023-04-28 LAB
C DIFF TOX GENS STL QL NAA+PROBE: NOT DETECTED
EXPIRATION DATE 2: NORMAL
EXPIRATION DATE 3: NORMAL
EXPIRATION DATE: NORMAL
GASTROCULT GAST QL: NEGATIVE
HEMOCCULT SP2 STL QL: NEGATIVE
HEMOCCULT SP3 STL QL: NEGATIVE
Lab: NORMAL
WBC STL QL MICRO: ABNORMAL

## 2023-04-28 PROCEDURE — 87177 OVA AND PARASITES SMEARS: CPT

## 2023-04-28 PROCEDURE — 87425 ROTAVIRUS AG IA: CPT | Performed by: NURSE PRACTITIONER

## 2023-04-28 PROCEDURE — 87045 FECES CULTURE AEROBIC BACT: CPT

## 2023-04-28 PROCEDURE — 87209 SMEAR COMPLEX STAIN: CPT

## 2023-04-28 PROCEDURE — 87329 GIARDIA AG IA: CPT

## 2023-04-28 PROCEDURE — 87205 SMEAR GRAM STAIN: CPT

## 2023-04-28 PROCEDURE — 87338 HPYLORI STOOL AG IA: CPT

## 2023-04-28 PROCEDURE — 87427 SHIGA-LIKE TOXIN AG IA: CPT

## 2023-04-28 PROCEDURE — 87046 STOOL CULTR AEROBIC BACT EA: CPT

## 2023-04-28 PROCEDURE — 87493 C DIFF AMPLIFIED PROBE: CPT

## 2023-04-29 LAB — RV AG STL QL IA: NEGATIVE

## 2023-05-01 DIAGNOSIS — A07.1 GIARDIA: Primary | ICD-10-CM

## 2023-05-01 DIAGNOSIS — E78.2 MIXED HYPERLIPIDEMIA: ICD-10-CM

## 2023-05-01 DIAGNOSIS — I10 ESSENTIAL HYPERTENSION: ICD-10-CM

## 2023-05-01 LAB — G LAMBLIA AG STL QL IA: POSITIVE

## 2023-05-01 RX ORDER — LOSARTAN POTASSIUM 50 MG/1
TABLET ORAL
Qty: 90 TABLET | Refills: 3 | Status: SHIPPED | OUTPATIENT
Start: 2023-05-01

## 2023-05-01 RX ORDER — ATORVASTATIN CALCIUM 10 MG/1
TABLET, FILM COATED ORAL
Qty: 90 TABLET | Refills: 3 | Status: SHIPPED | OUTPATIENT
Start: 2023-05-01

## 2023-05-01 RX ORDER — TINIDAZOLE 500 MG/1
2 TABLET ORAL ONCE
Qty: 4 TABLET | Refills: 0 | Status: SHIPPED | OUTPATIENT
Start: 2023-05-01 | End: 2023-05-01

## 2023-05-01 RX ORDER — METOPROLOL SUCCINATE 50 MG/1
TABLET, EXTENDED RELEASE ORAL
Qty: 90 TABLET | Refills: 3 | Status: SHIPPED | OUTPATIENT
Start: 2023-05-01

## 2023-05-02 LAB — H PYLORI AG STL QL IA: NEGATIVE

## 2023-05-03 LAB
BACTERIA SPEC CULT: NORMAL
CAMPYLOBACTER STL CULT: NORMAL
E COLI SXT STL QL IA: NEGATIVE
SALM + SHIG STL CULT: NORMAL
YERSINIA SPEC CULT: NORMAL

## 2023-05-07 LAB
O+P SPEC MICRO: NORMAL
O+P STL TRI STN: NORMAL

## 2023-06-19 DIAGNOSIS — R19.7 DIARRHEA OF PRESUMED INFECTIOUS ORIGIN: ICD-10-CM

## 2023-06-19 DIAGNOSIS — A07.1 GIARDIA: Primary | ICD-10-CM

## 2023-06-19 DIAGNOSIS — Z86.19 HISTORY OF GIARDIA INFECTION: ICD-10-CM

## 2023-08-02 ENCOUNTER — LAB (OUTPATIENT)
Dept: INTERNAL MEDICINE | Facility: CLINIC | Age: 73
End: 2023-08-02
Payer: MEDICARE

## 2023-08-02 DIAGNOSIS — R19.7 DIARRHEA OF PRESUMED INFECTIOUS ORIGIN: ICD-10-CM

## 2023-08-02 PROCEDURE — 87329 GIARDIA AG IA: CPT | Performed by: PHYSICIAN ASSISTANT

## 2023-08-03 LAB — G LAMBLIA AG STL QL IA: NEGATIVE

## 2023-09-12 ENCOUNTER — OFFICE VISIT (OUTPATIENT)
Dept: PAIN MEDICINE | Facility: CLINIC | Age: 73
End: 2023-09-12
Payer: MEDICARE

## 2023-09-12 ENCOUNTER — TELEPHONE (OUTPATIENT)
Dept: PAIN MEDICINE | Facility: CLINIC | Age: 73
End: 2023-09-12
Payer: MEDICARE

## 2023-09-12 VITALS — WEIGHT: 118.4 LBS | BODY MASS INDEX: 22.37 KG/M2

## 2023-09-12 DIAGNOSIS — M48.062 LUMBAR STENOSIS WITH NEUROGENIC CLAUDICATION: Primary | ICD-10-CM

## 2023-09-12 DIAGNOSIS — M43.16 SPONDYLOLISTHESIS OF LUMBAR REGION: ICD-10-CM

## 2023-09-12 DIAGNOSIS — M51.36 DDD (DEGENERATIVE DISC DISEASE), LUMBAR: ICD-10-CM

## 2023-09-12 DIAGNOSIS — M81.0 OSTEOPOROSIS, UNSPECIFIED OSTEOPOROSIS TYPE, UNSPECIFIED PATHOLOGICAL FRACTURE PRESENCE: ICD-10-CM

## 2023-09-12 DIAGNOSIS — Z87.81 HISTORY OF COMPRESSION FRACTURE OF VERTEBRAL COLUMN: ICD-10-CM

## 2023-09-12 DIAGNOSIS — M54.16 LUMBAR RADICULOPATHY: ICD-10-CM

## 2023-09-12 DIAGNOSIS — M48.062 LUMBAR STENOSIS WITH NEUROGENIC CLAUDICATION: ICD-10-CM

## 2023-09-12 PROCEDURE — 99214 OFFICE O/P EST MOD 30 MIN: CPT | Performed by: NURSE PRACTITIONER

## 2023-09-12 PROCEDURE — 1125F AMNT PAIN NOTED PAIN PRSNT: CPT | Performed by: NURSE PRACTITIONER

## 2023-09-12 PROCEDURE — 1160F RVW MEDS BY RX/DR IN RCRD: CPT | Performed by: NURSE PRACTITIONER

## 2023-09-12 PROCEDURE — 1159F MED LIST DOCD IN RCRD: CPT | Performed by: NURSE PRACTITIONER

## 2023-09-12 NOTE — PROGRESS NOTES
"Chief Complaint: \"Lower back and right leg pain.\"       History of Present Illness:   Patient: Ms. Carleen Payne, 73 y.o. female originally referred by Dr. Priyanka Guardado in consultation for intractable chronic lower back pain. Patient reports a longstanding history of lower back pain, which began after a fall.  She is continue to respond exceptionally well to conservative and interventional pain management measures.  We last saw her on January 25, 2023 when she underwent therapeutic right L4-L5 and right L5-S1 transforaminal epidural steroid injections, from which she reports experiencing 100% pain relief and functional improvement 7 months, only over the last week as she began to experience recurrence of her right leg pain.  Prior to that on August 24, 2022, she underwent repeat right L4-L5 and right L5-S1 transforaminal epidural steroid injection, which provided her with 100% pain relief of her right lower extremity symptoms lasting almost 5 months.   She returns today for reevaluation of her chronic lower back and right lower extremity pain.   Pain description: constant pain with intermittent exacerbation, described as aching and burning sensation.   Radiation of pain: The lower back pain radiates into the posterior aspect of the right thigh, right calf to the ankle.    Pain intensity today: 4/10  Average pain intensity last week: 4/10  Pain intensity ranges from: 4/10 to 4/10  Aggravating factors: Pain increases with lying down, standing and walking.   Alleviating factors: Pain decreases with analgesics, sitting, and at times walking.  Associated symptoms:   Patient reports tingling, pain and weakness in the the right lower extremity.   Patient denies any new bladder or bowel problems.   Patient denies difficulties with her balance or recent falls  Pain interferes with regular activities, ADLs, and affects patient's quality of life  Pain interferes with general activities (ability to walk, stand, transition " from different positions), perform activities of daily living  Pain interferes with sleep causing sleep fragmentation   Muscle spasms  Stiffness      Review of previous therapies and additional medical records:  Carleen Payne has already failed the following measures, including:   Conservative measures: oral analgesics and physical therapy   Interventional measures: 02/26/2020: DxTx right L4-L5 and right L5-S1 TESI  08/17/2020: right L4-L5 and right L5-S1 TESI  05/12/2021: right L4-L5 and right L5-S1 TESI  09/22/2021: right L4-L5 and right L5-S1 TESI  02/28/2022: right L4-L5 and right L5-S1 TESI  08/24/2022: right L4-L5 and right L5-S1 TESI  01/25/2023: right L4-L5 and right L5-S1 TESI  Former patient of Dr. Talbert. She underwent epidural injections (RT L4-L5 transforaminals x4) with significant relief  Surgical measures: No history of lumbar spine surgery  Carleen Payne underwent neurosurgical consultation with Dr. Gomes on 06/16/2017, and was found not to be a surgical candidate.  Carleen Payne is a healthy adult other than her chronic pain and HTN.  In terms of current analgesics, Carleen Payne takes: aspirin PRN  I have reviewed Anatoly Report is consistent to medication reconciliation.     Global Pain Scale 06-20 2019 02-19 2020 03-23 2020 02-24 2022 09-12 2023         Pain  1  12  10  10  9         Feelings  0  0  0  0  0         Clinical outcomes  0  0  2  2  3         Activities  0  8  0  2  0         GPS Total:  3  20  12  14  12            The [place] Back Pain Disability Scale  DATE 09-12 2023                 Sleep through the night 0                 Turn over in bed 0                 Get out of bed 0                 Make your bed 0                 Put on socks (pantyhose) 0                 Ride in a car 0                 Sit in a chair for several hours 4                 Stand up for 20-30 minutes 0                 Climb one flight of stairs 0                 Walk a few blocks (200-300 yards)   0                 Walk several miles 0                 Run one block (about 50 yards) 3                 Take food out of the refrigerator 0                 Reach up to high shelves 0                 Move a chair 3                 Pull or push heavy doors 3                 Bend over to clean the bathtub 1                 Throw a ball 0                 Carry two bags of groceries 3                 Lift and carry a heavy suitcase 4                 Total score 21                       Review of Diagnostic Studies:  MRI of the lumbar spine without contrast 8/28/2022: Imaging was reviewed.  Old T12 compression fracture with 50% loss of height.  Anterolisthesis of L4 on L5.  T11-12: Posterior disc bulge with bowing of the posterior wall of the superior endplate of the T12 vertebral body.  Borderline spinal stenosis.  T12 1: Small left paracentral disc protrusion with mild facet arthritis.  L1-L2: Mild facet hypertrophy without significant spinal canal neuroforaminal stenosis.  L2-L3: Facet hypertrophy without significant spinal canal neuroforaminal stenosis.  L3-L4: Facet hypertrophy with out significant spinal canal neuroforaminal stenosis.  L4-L5: 6 mm of anterolisthesis of L4 on L5, severe facet hypertrophy creating moderate to severe spinal stenosis, with moderate bilateral neural foraminal stenosis.  L4-L5: Disc bulge with severe facet hypertrophy and mild bilateral neuroforaminal stenosis, mild spinal canal stenosis.  Lumbar spine x-ray flexion-extension views 9/22/2022: Grade 1 anterior listhesis of L4 on L5 and mild anterior listhesis of L5 on S1.  There is dynamic instability most seen in flexion view.  Multilevel spondylosis most significant at L3-L4 and L4-5.      Review of Systems   Musculoskeletal:  Positive for arthralgias and back pain.   Allergic/Immunologic: Positive for environmental allergies.   All other systems reviewed and are negative.      Patient Active Problem List   Diagnosis    Lumbar  radiculopathy    Right sided sciatica    Leg pain    Lumbar stenosis with neurogenic claudication    History of compression fracture of vertebral column    Osteoporosis    Spondylolisthesis of lumbar region    DDD (degenerative disc disease), lumbar    Mild cognitive impairment    Impetigo    Preseptal cellulitis of right lower eyelid    Dermatitis    Edema of left eyelid       Past Medical History:   Diagnosis Date    Chronic pain disorder 03/2000    Hyperlipidemia     Hypertension     Joint pain 14 years    Lumbosacral disc disease 18 years    Osteoporosis     Shingles          Past Surgical History:   Procedure Laterality Date    EPIDURAL BLOCK  5/12/2021    NO PAST SURGERIES           Family History   Problem Relation Age of Onset    Hypertension Mother     Hypertension Father     Stroke Father     Heart attack Brother     Thyroid disease Daughter     Stroke Maternal Grandmother     Cancer Maternal Grandfather     Cancer Paternal Grandmother     Ovarian cancer Paternal Grandmother         60'S    Arthritis Brother     Ovarian cancer Paternal Aunt         60'S    Ovarian cancer Cousin         PATERNAL 1ST COUSIN, 40'S    Ovarian cancer Cousin         PATERNAL 1ST COUSIN, 60'S    Breast cancer Neg Hx          Social History     Socioeconomic History    Marital status:    Tobacco Use    Smoking status: Never     Passive exposure: Never    Smokeless tobacco: Never   Vaping Use    Vaping Use: Never used   Substance and Sexual Activity    Alcohol use: Never    Drug use: Never    Sexual activity: Yes     Partners: Male           Current Outpatient Medications:     atorvastatin (LIPITOR) 10 MG tablet, TAKE 1 TABLET DAILY, Disp: 90 tablet, Rfl: 3    Calcium 150 MG tablet, Take 150 mg by mouth Daily., Disp: , Rfl:     Cholecalciferol (VITAMIN D) 1000 UNITS tablet, Take 1 tablet by mouth Daily., Disp: , Rfl:     Desloratadine-Pseudoephedrine (CLARINEX-D 24 HOUR PO), CLARINEX-D 24 HOUR, Disp: , Rfl:     EPINEPHrine  (EPIPEN) 0.3 MG/0.3ML solution auto-injector injection, INJECT INTRAMUSCULARLY AS NEEDED FOR ALLERGIC REACTION, Disp: , Rfl:     loratadine (CLARITIN) 10 MG tablet, TAKE 1 TABLET DAILY, Disp: 90 tablet, Rfl: 3    losartan (COZAAR) 50 MG tablet, TAKE 1 TABLET DAILY, Disp: 90 tablet, Rfl: 3    metoprolol succinate XL (TOPROL-XL) 50 MG 24 hr tablet, TAKE 1 TABLET DAILY, Disp: 90 tablet, Rfl: 3      Allergies   Allergen Reactions    Erythromycin Base Nausea And Vomiting    Gabapentin Swelling     Body swelling    Isothiazolinone Chloride Rash         Wt 53.7 kg (118 lb 6.4 oz)   BMI 22.37 kg/m²           Physical Exam:   Constitutional: Patient is oriented to person, place, and time. Patient appears well-developed and well-nourished.   HEENT: Head: Normocephalic and atraumatic. Eyes: Conjunctivae and lids are normal. Pupils: Equal, round, reactive to light.   Neck: Trachea normal. Neck supple. No JVD present.   Pulmonary Respiratory effort: No increased work of breathing or signs of respiratory distress.   Peripheral vascular exam: Femoral: right 2+, left 2+. Posterior tibialis: right 2+ and left 2+. Dorsalis pedis: right 2+ and left 2+. No edema.   Musculoskeletal   Gait and station: Gait evaluation demonstrated a normal gait   Lumbar spine: Passive and active range of motion are full and without pain. Extension, flexion, lateral flexion, rotation of the lumbar spine did not increase or reproduce pain. Lumbar facet joint loading maneuvers are negative.   Rodrigo test and Gaenslen's test are negative   Piriformis maneuvers are negative   Palpation of the bilateral ischial tuberosities, unrevealing   Palpation of the bilateral greater trochanter, unrevealing   Examination of the Iliotibial band: unrevealing   Hip joints: The range of motion of the hip joints is full and without pain   Neurological: Patient is alert and oriented to person, place, and time. Speech: speech is normal. Cortical function: Normal mental  status.   Reflex Scores:  Right patellar: 2+  Left patellar: 2+  Right Achilles: 2+  Left Achilles: 2+  Motor strength: 5/5  Motor Tone: normal tone.   Involuntary movements: none.   Superficial/Primitive Reflexes: primitive reflexes were absent.   Right Carcamo: absent  Left Carcamo: absent  Right ankle clonus: absent  Left ankle clonus: absent   Negative long tract signs. Straight leg raising test on the right elicits her posterior thigh pain. Femoral stretch sign is negative.   Sensation: No sensory loss. Sensory exam: intact to light touch, intact pain and temperature sensation, intact vibration sensation and normal proprioception.   Coordination: Normal finger to nose and heel to shin. Normal balance and negative Romberg's sign   Skin and subcutaneous tissue: Skin is warm and intact. No rash noted. No cyanosis.   Psychiatric: Judgment and insight: Normal. Orientation to person, place and time: Normal. Recent and remote memory: Intact. Mood and affect: Normal.       ASSESSMENT:   1. Lumbar stenosis with neurogenic claudication    2. Lumbar radiculopathy    3. Spondylolisthesis of lumbar region    4. DDD (degenerative disc disease), lumbar    5. History of compression fracture of vertebral column    6. Osteoporosis, unspecified osteoporosis type, unspecified pathological fracture presence          PLAN/MEDICAL DECISION MAKING: Ms. Carleen Payne, 73 y.o. female reports a longstanding history of lower back pain.  Most recent MRI of the lumbar spine without contrast in revealed multilevel degenerative disc disease, at L4-L5 there is severe facet hypertrophy, a grade 1 anterior listhesis of L4 on L5, this is contributing to moderate to severe spinal canal stenosis and moderate bilateral neuroforaminal stenosis.  Lumbar spine x-rays and flexion-extension views revealed instability seen L4-L5, most in flexion view.  She has responded exceptionally well to previous transforaminal epidurals, as referenced above.  She  continues to exercise, and has participated in physical therapy in the past.  I had a lengthy conversation with Ms. Carleen Payne regarding her chronic pain condition and potential therapeutic options including risks, benefits, alternative therapies, to name a few. Patient has failed to obtain pain relief with conservative measures, as referenced above. Patient experiences significant pain relief from interventional pain management measures, as referenced above. I have reviewed all available patient's medical records as well as previous therapies as referenced above.   Therefore, I have proposed the following plan:  1. Pharmacological measures: Reviewed. Discussed.   A. Patient takes aspirin PRN  2. Interventional pain management measures: Patient will be scheduled for repeat right L4-L5 and right L5-S1 transforaminal epidural steroid injections. We may repeat epidurals depending on patient's outcome, or facilitate NS follow-up with Dr Gomes.   3. Long-term rehabilitation efforts:  A. The patient does not have a history of falls. I did complete a risk assessment for falls  B. Patient will start a comprehensive physical therapy program for reconditioning, therapeutic exercise, upper body strengthening/posture correction, core strengthening, gait and balance training, neurodynamics, myofascial release, cupping and dry needling if pain recurs  C. Start an exercise program such as yoga, Pilates, Juan Daniel Chi and water therapy  D. Contrast therapy: Apply ice-packs for 15-20 minutes, followed by heating pads for 15-20 minutes to affected area   5. The patient has been instructed to contact my office with any questions or difficulties. The patient understands the plan and agrees to proceed accordingly.        Carleen Payne reports a pain score of 4.  Given her pain assessment as noted, treatment options were discussed and the following options were decided upon as a follow-up plan to address the patient's pain:  continuation of current treatment plan for pain, educational materials on pain management, home exercises and therapy, patient not interested in pharmacological measures, referral to Physical Therapy, steroid injections, and use of non-medical modalities (ice, heat, stretching and/or behavior modifications).               Patient Care Team:  Luna Isidro APRN as PCP - General (Nurse Practitioner)  Herve Philippe MD as Consulting Physician (Pain Medicine)  Flores Stratton APRN as Nurse Practitioner (Pain Medicine)     No orders of the defined types were placed in this encounter.        Future Appointments   Date Time Provider Department Center   9/26/2023  9:15 AM Luna Isidro APRN [facility]         RAYMOND Lorenzana

## 2023-09-12 NOTE — TELEPHONE ENCOUNTER
Called the patient and gave her the date time and location of her procedure with Dr. Philippe on 9/20/2023.

## 2023-09-19 DIAGNOSIS — M48.062 LUMBAR STENOSIS WITH NEUROGENIC CLAUDICATION: Primary | ICD-10-CM

## 2023-09-20 ENCOUNTER — OUTSIDE FACILITY SERVICE (OUTPATIENT)
Dept: PAIN MEDICINE | Facility: CLINIC | Age: 73
End: 2023-09-20

## 2023-09-20 PROCEDURE — 64484 NJX AA&/STRD TFRM EPI L/S EA: CPT | Performed by: ANESTHESIOLOGY

## 2023-09-20 PROCEDURE — 64483 NJX AA&/STRD TFRM EPI L/S 1: CPT | Performed by: ANESTHESIOLOGY

## 2023-09-26 ENCOUNTER — OFFICE VISIT (OUTPATIENT)
Dept: INTERNAL MEDICINE | Facility: CLINIC | Age: 73
End: 2023-09-26
Payer: MEDICARE

## 2023-09-26 VITALS
RESPIRATION RATE: 16 BRPM | BODY MASS INDEX: 22.24 KG/M2 | WEIGHT: 117.8 LBS | DIASTOLIC BLOOD PRESSURE: 66 MMHG | HEIGHT: 61 IN | TEMPERATURE: 98.4 F | HEART RATE: 72 BPM | SYSTOLIC BLOOD PRESSURE: 122 MMHG

## 2023-09-26 DIAGNOSIS — I10 PRIMARY HYPERTENSION: ICD-10-CM

## 2023-09-26 DIAGNOSIS — E55.9 VITAMIN D DEFICIENCY: ICD-10-CM

## 2023-09-26 DIAGNOSIS — I10 ESSENTIAL HYPERTENSION: ICD-10-CM

## 2023-09-26 DIAGNOSIS — E78.5 HYPERLIPIDEMIA, UNSPECIFIED HYPERLIPIDEMIA TYPE: ICD-10-CM

## 2023-09-26 DIAGNOSIS — E78.2 MIXED HYPERLIPIDEMIA: ICD-10-CM

## 2023-09-26 DIAGNOSIS — Z00.00 ENCOUNTER FOR SUBSEQUENT ANNUAL WELLNESS VISIT (AWV) IN MEDICARE PATIENT: Primary | ICD-10-CM

## 2023-09-26 DIAGNOSIS — A09 DIARRHEA OF INFECTIOUS ORIGIN: ICD-10-CM

## 2023-09-26 DIAGNOSIS — J30.9 ALLERGIC RHINITIS, UNSPECIFIED SEASONALITY, UNSPECIFIED TRIGGER: ICD-10-CM

## 2023-09-26 LAB
25(OH)D3 SERPL-MCNC: 60.7 NG/ML (ref 30–100)
ALBUMIN SERPL-MCNC: 4.4 G/DL (ref 3.5–5.2)
ALBUMIN/GLOB SERPL: 1.8 G/DL
ALP SERPL-CCNC: 84 U/L (ref 39–117)
ALT SERPL W P-5'-P-CCNC: 16 U/L (ref 1–33)
ANION GAP SERPL CALCULATED.3IONS-SCNC: 11.3 MMOL/L (ref 5–15)
AST SERPL-CCNC: 21 U/L (ref 1–32)
BASOPHILS # BLD AUTO: 0.05 10*3/MM3 (ref 0–0.2)
BASOPHILS NFR BLD AUTO: 0.7 % (ref 0–1.5)
BILIRUB SERPL-MCNC: 0.4 MG/DL (ref 0–1.2)
BUN SERPL-MCNC: 16 MG/DL (ref 8–23)
BUN/CREAT SERPL: 23.2 (ref 7–25)
CALCIUM SPEC-SCNC: 9.6 MG/DL (ref 8.6–10.5)
CHLORIDE SERPL-SCNC: 105 MMOL/L (ref 98–107)
CHOLEST SERPL-MCNC: 205 MG/DL (ref 0–200)
CO2 SERPL-SCNC: 22.7 MMOL/L (ref 22–29)
CREAT SERPL-MCNC: 0.69 MG/DL (ref 0.57–1)
DEPRECATED RDW RBC AUTO: 42.4 FL (ref 37–54)
EGFRCR SERPLBLD CKD-EPI 2021: 92.9 ML/MIN/1.73
EOSINOPHIL # BLD AUTO: 0.2 10*3/MM3 (ref 0–0.4)
EOSINOPHIL NFR BLD AUTO: 2.6 % (ref 0.3–6.2)
ERYTHROCYTE [DISTWIDTH] IN BLOOD BY AUTOMATED COUNT: 12.7 % (ref 12.3–15.4)
GLOBULIN UR ELPH-MCNC: 2.5 GM/DL
GLUCOSE SERPL-MCNC: 100 MG/DL (ref 65–99)
HCT VFR BLD AUTO: 42.8 % (ref 34–46.6)
HDLC SERPL-MCNC: 73 MG/DL (ref 40–60)
HGB BLD-MCNC: 14.7 G/DL (ref 12–15.9)
IMM GRANULOCYTES # BLD AUTO: 0.04 10*3/MM3 (ref 0–0.05)
IMM GRANULOCYTES NFR BLD AUTO: 0.5 % (ref 0–0.5)
LDLC SERPL CALC-MCNC: 105 MG/DL (ref 0–100)
LDLC/HDLC SERPL: 1.38 {RATIO}
LYMPHOCYTES # BLD AUTO: 2.09 10*3/MM3 (ref 0.7–3.1)
LYMPHOCYTES NFR BLD AUTO: 27.6 % (ref 19.6–45.3)
MCH RBC QN AUTO: 31.7 PG (ref 26.6–33)
MCHC RBC AUTO-ENTMCNC: 34.3 G/DL (ref 31.5–35.7)
MCV RBC AUTO: 92.2 FL (ref 79–97)
MONOCYTES # BLD AUTO: 0.87 10*3/MM3 (ref 0.1–0.9)
MONOCYTES NFR BLD AUTO: 11.5 % (ref 5–12)
NEUTROPHILS NFR BLD AUTO: 4.31 10*3/MM3 (ref 1.7–7)
NEUTROPHILS NFR BLD AUTO: 57.1 % (ref 42.7–76)
NRBC BLD AUTO-RTO: 0 /100 WBC (ref 0–0.2)
PLATELET # BLD AUTO: 283 10*3/MM3 (ref 140–450)
PMV BLD AUTO: 10 FL (ref 6–12)
POTASSIUM SERPL-SCNC: 4.2 MMOL/L (ref 3.5–5.2)
PROT SERPL-MCNC: 6.9 G/DL (ref 6–8.5)
RBC # BLD AUTO: 4.64 10*6/MM3 (ref 3.77–5.28)
SODIUM SERPL-SCNC: 139 MMOL/L (ref 136–145)
TRIGL SERPL-MCNC: 157 MG/DL (ref 0–150)
TSH SERPL DL<=0.05 MIU/L-ACNC: 0.64 UIU/ML (ref 0.27–4.2)
VLDLC SERPL-MCNC: 27 MG/DL (ref 5–40)
WBC NRBC COR # BLD: 7.56 10*3/MM3 (ref 3.4–10.8)

## 2023-09-26 PROCEDURE — 82306 VITAMIN D 25 HYDROXY: CPT | Performed by: NURSE PRACTITIONER

## 2023-09-26 PROCEDURE — 80053 COMPREHEN METABOLIC PANEL: CPT | Performed by: NURSE PRACTITIONER

## 2023-09-26 PROCEDURE — 80061 LIPID PANEL: CPT | Performed by: NURSE PRACTITIONER

## 2023-09-26 PROCEDURE — 84443 ASSAY THYROID STIM HORMONE: CPT | Performed by: NURSE PRACTITIONER

## 2023-09-26 PROCEDURE — 85025 COMPLETE CBC W/AUTO DIFF WBC: CPT | Performed by: NURSE PRACTITIONER

## 2023-09-26 RX ORDER — METOPROLOL SUCCINATE 50 MG/1
50 TABLET, EXTENDED RELEASE ORAL DAILY
Qty: 90 TABLET | Refills: 1 | Status: SHIPPED | OUTPATIENT
Start: 2023-09-26

## 2023-09-26 RX ORDER — LOSARTAN POTASSIUM 50 MG/1
50 TABLET ORAL DAILY
Qty: 90 TABLET | Refills: 1 | Status: SHIPPED | OUTPATIENT
Start: 2023-09-26

## 2023-09-26 RX ORDER — LORATADINE 10 MG/1
10 TABLET ORAL DAILY
Qty: 90 TABLET | Refills: 1 | Status: SHIPPED | OUTPATIENT
Start: 2023-09-26

## 2023-09-26 RX ORDER — ATORVASTATIN CALCIUM 10 MG/1
10 TABLET, FILM COATED ORAL DAILY
Qty: 90 TABLET | Refills: 1 | Status: SHIPPED | OUTPATIENT
Start: 2023-09-26

## 2023-09-26 NOTE — PROGRESS NOTES
The ABCs of the Annual Wellness Visit  Subsequent Medicare Wellness Visit    Subjective    Carleen Payne is a 73 y.o. female who presents for a Subsequent Medicare Wellness Visit.    The following portions of the patient's history were reviewed and   updated as appropriate: allergies, current medications, past medical history, past social history, past surgical history, and problem list.    Compared to one year ago, the patient feels her physical   health is worse.    Compared to one year ago, the patient feels her mental   health is the same.    Recent Hospitalizations:  She was not admitted to the hospital during the last year.       Current Medical Providers:  Patient Care Team:  Luna Isidro APRN as PCP - General (Nurse Practitioner)  Herve Philippe MD as Consulting Physician (Pain Medicine)  Flores Stratton APRN as Nurse Practitioner (Pain Medicine)    Outpatient Medications Prior to Visit   Medication Sig Dispense Refill    Calcium 150 MG tablet Take 150 mg by mouth Daily.      Cholecalciferol (VITAMIN D) 1000 UNITS tablet Take 1 tablet by mouth Daily.      EPINEPHrine (EPIPEN) 0.3 MG/0.3ML solution auto-injector injection INJECT INTRAMUSCULARLY AS NEEDED FOR ALLERGIC REACTION      atorvastatin (LIPITOR) 10 MG tablet TAKE 1 TABLET DAILY 90 tablet 3    Desloratadine-Pseudoephedrine (CLARINEX-D 24 HOUR PO) CLARINEX-D 24 HOUR      loratadine (CLARITIN) 10 MG tablet TAKE 1 TABLET DAILY 90 tablet 3    losartan (COZAAR) 50 MG tablet TAKE 1 TABLET DAILY 90 tablet 3    metoprolol succinate XL (TOPROL-XL) 50 MG 24 hr tablet TAKE 1 TABLET DAILY 90 tablet 3     No facility-administered medications prior to visit.       No opioid medication identified on active medication list. I have reviewed chart for other potential  high risk medication/s and harmful drug interactions in the elderly.        Aspirin is not on active medication list.  Aspirin use is not indicated based on review of current medical  "condition/s. Risk of harm outweighs potential benefits.  .    Patient Active Problem List   Diagnosis    Lumbar radiculopathy    Right sided sciatica    Leg pain    Lumbar stenosis with neurogenic claudication    History of compression fracture of vertebral column    Osteoporosis    Spondylolisthesis of lumbar region    DDD (degenerative disc disease), lumbar    Mild cognitive impairment    Impetigo    Preseptal cellulitis of right lower eyelid    Dermatitis    Edema of left eyelid     Advance Care Planning   Advance Care Planning     Advance Directive is not on file.  ACP discussion was held with the patient during this visit. Patient does not have an advance directive, declines further assistance.     Objective    Vitals:    23 0920   BP: 122/66   BP Location: Right arm   Patient Position: Sitting   Cuff Size: Adult   Pulse: 72   Resp: 16   Temp: 98.4 °F (36.9 °C)   TempSrc: Infrared   Weight: 53.4 kg (117 lb 12.8 oz)   Height: 154.9 cm (61\")   PainSc:   2     Estimated body mass index is 22.26 kg/m² as calculated from the following:    Height as of this encounter: 154.9 cm (61\").    Weight as of this encounter: 53.4 kg (117 lb 12.8 oz).    BMI is within normal parameters. No other follow-up for BMI required.      Does the patient have evidence of cognitive impairment? No          HEALTH RISK ASSESSMENT    Smoking Status:  Social History     Tobacco Use   Smoking Status Never    Passive exposure: Never   Smokeless Tobacco Never     Alcohol Consumption:  Social History     Substance and Sexual Activity   Alcohol Use Never     Fall Risk Screen:    STEADI Fall Risk Assessment was completed, and patient is at MODERATE risk for falls. Assessment completed on:2023    Depression Screenin/26/2023     9:26 AM   PHQ-2/PHQ-9 Depression Screening   Little Interest or Pleasure in Doing Things 0-->not at all   Feeling Down, Depressed or Hopeless 0-->not at all   PHQ-9: Brief Depression Severity Measure Score " 0       Health Habits and Functional and Cognitive Screenin/26/2023     9:25 AM   Functional & Cognitive Status   Do you have difficulty preparing food and eating? No   Do you have difficulty bathing yourself, getting dressed or grooming yourself? No   Do you have difficulty using the toilet? No   Do you have difficulty moving around from place to place? No   Do you have trouble with steps or getting out of a bed or a chair? No   Current Diet Limited Junk Food   Dental Exam Up to date   Eye Exam Not up to date   Exercise (times per week) 7 times per week   Current Exercises Include Walking   Do you need help using the phone?  No   Are you deaf or do you have serious difficulty hearing?  No   Do you need help to go to places out of walking distance? No   Do you need help shopping? No   Do you need help preparing meals?  No   Do you need help with housework?  No   Do you need help with laundry? No   Do you need help taking your medications? No   Do you need help managing money? No   Do you ever drive or ride in a car without wearing a seat belt? No   Have you felt unusual stress, anger or loneliness in the last month? No   Who do you live with? Spouse   If you need help, do you have trouble finding someone available to you? No   Have you been bothered in the last four weeks by sexual problems? No   Do you have difficulty concentrating, remembering or making decisions? Yes       Age-appropriate Screening Schedule:  Refer to the list below for future screening recommendations based on patient's age, sex and/or medical conditions. Orders for these recommended tests are listed in the plan section. The patient has been provided with a written plan.    Health Maintenance   Topic Date Due    ZOSTER VACCINE (2 of 2) 2013    COVID-19 Vaccine (3 - Moderna series) 2021    ANNUAL WELLNESS VISIT  2023    Pneumococcal Vaccine 65+ (1 - PCV) 2024 (Originally 2017)    INFLUENZA VACCINE  10/01/2023     MAMMOGRAM  11/30/2023    PAP SMEAR  12/02/2023    LIPID PANEL  04/26/2024    TDAP/TD VACCINES (2 - Td or Tdap) 09/21/2024    DXA SCAN  09/27/2024    COLORECTAL CANCER SCREENING  07/09/2029    HEPATITIS C SCREENING  Addressed                  CMS Preventative Services Quick Reference  Risk Factors Identified During Encounter  Fall Risk-High or Moderate: Discussed Fall Prevention in the home  Glaucoma or Family History of Glaucoma:  Optometry Appointment Recommended  Immunizations Discussed/Encouraged: COVID19 Shingles  Dental Screening Recommended  Vision Screening Recommended  The above risks/problems have been discussed with the patient.  Pertinent information has been shared with the patient in the After Visit Summary.  An After Visit Summary and PPPS were made available to the patient.    Follow Up:   Next Medicare Wellness visit to be scheduled in 1 year.       Additional E&M Note during same encounter follows:  Patient has multiple medical problems which are significant and separately identifiable that require additional work above and beyond the Medicare Wellness Visit.      Chief Complaint  Medicare Wellness-subsequent    Subjective        HPI  Carleen Payne is also being seen today for diarrhea.  She has a history of Giardia.Onset of diarrhea was  10 days ago . Diarrhea is occurring approximately 3 times per day. Patient describes diarrhea as watery. Diarrhea has been associated with  cramping .  Patient denies blood in stool, fever, recent camping, recent travel, significant abdominal pain, unintentional weight loss.  Previous visits for diarrhea: yes, last seen 3 months ago by me. She traveled to [facility] 5 months ago; she required 2 rounds of antibiotics; initially tinidazole; then albendazole and metronidazole. Last Giardia 8/2/23 was negative.        Objective   Vital Signs:  /66 (BP Location: Right arm, Patient Position: Sitting, Cuff Size: Adult)   Pulse 72   Temp 98.4 °F (36.9 °C)  "(Infrared)   Resp 16   Ht 154.9 cm (61\")   Wt 53.4 kg (117 lb 12.8 oz)   BMI 22.26 kg/m²     Physical Exam  Constitutional:       General: She is not in acute distress.     Appearance: She is not ill-appearing.   HENT:      Head: Normocephalic.   Neck:      Vascular: No carotid bruit.   Cardiovascular:      Rate and Rhythm: Normal rate and regular rhythm.      Heart sounds: Normal heart sounds. No murmur heard.  Pulmonary:      Breath sounds: Normal breath sounds.   Abdominal:      General: Bowel sounds are normal.      Tenderness: There is no abdominal tenderness.   Musculoskeletal:      Right lower leg: No edema.      Left lower leg: No edema.   Neurological:      General: No focal deficit present.      Mental Status: She is oriented to person, place, and time.   Psychiatric:         Mood and Affect: Mood normal.      Finger Rub Hearing{Test (right ear):passed  Finger Rub Hearing{Test (left ear):passed               Assessment and Plan   Diagnoses and all orders for this visit:    1. Encounter for subsequent annual wellness visit (AWV) in Medicare patient (Primary)    2. Diarrhea of infectious origin  -     Giardia Antigen - Stool, Per Rectum; Future  -     Clostridioides difficile Toxin, PCR - Stool, Per Rectum; Future  -     Fecal Leukocytes - Stool, Per Rectum; Future  -     POC Occult Blood X 3, Stool; Future  -     Rotavirus Antigen, Stool - Stool, Per Rectum; Future  -     Stool Culture (Reference Lab) - Stool, Per Rectum; Future  -     Stool Culture With Yersinia - Stool, Per Rectum; Future    3. Hyperlipidemia, unspecified hyperlipidemia type  -     Lipid Panel; Future  -     Lipid Panel    4. Primary hypertension  -     Comprehensive Metabolic Panel; Future  -     CBC & Differential; Future  -     TSH Rfx On Abnormal To Free T4; Future  -     Comprehensive Metabolic Panel  -     CBC & Differential  -     TSH Rfx On Abnormal To Free T4    5. Mixed hyperlipidemia  -     atorvastatin (LIPITOR) 10 MG " tablet; Take 1 tablet by mouth Daily.  Dispense: 90 tablet; Refill: 1    6. Essential hypertension  -     metoprolol succinate XL (TOPROL-XL) 50 MG 24 hr tablet; Take 1 tablet by mouth Daily.  Dispense: 90 tablet; Refill: 1  -     losartan (COZAAR) 50 MG tablet; Take 1 tablet by mouth Daily.  Dispense: 90 tablet; Refill: 1    7. Vitamin D deficiency  -     Vitamin D 25 hydroxy; Future  -     Vitamin D 25 hydroxy    8. Allergic rhinitis, unspecified seasonality, unspecified trigger  -     loratadine (CLARITIN) 10 MG tablet; Take 1 tablet by mouth Daily.  Dispense: 90 tablet; Refill: 1    Continue supportive care.   Health Maintenance for Postmenopausal Women  Menopause is a normal process in which your reproductive ability comes to an end. This process happens gradually over a span of months to years, usually between the ages of 48 and 55. Menopause is complete when you have missed 12 consecutive menstrual periods.  It is important to talk with your health care provider about some of the most common conditions that affect postmenopausal women, such as heart disease, cancer, and bone loss (osteoporosis). Adopting a healthy lifestyle and getting preventive care can help to promote your health and wellness. Those actions can also lower your chances of developing some of these common conditions.  What should I know about menopause?  During menopause, you may experience a number of symptoms, such as:  Moderate-to-severe hot flashes.  Night sweats.  Decrease in sex drive.  Mood swings.  Headaches.  Tiredness.  Irritability.  Memory problems.  Insomnia.     Choosing to treat or not to treat menopausal changes is an individual decision that you make with your health care provider.  What should I know about hormone replacement therapy and supplements?  Hormone therapy products are effective for treating symptoms that are associated with menopause, such as hot flashes and night sweats. Hormone replacement carries certain  risks, especially as you become older. If you are thinking about using estrogen or estrogen with progestin treatments, discuss the benefits and risks with your health care provider.  What should I know about heart disease and stroke?  Heart disease, heart attack, and stroke become more likely as you age. This may be due, in part, to the hormonal changes that your body experiences during menopause. These can affect how your body processes dietary fats, triglycerides, and cholesterol. Heart attack and stroke are both medical emergencies.    There are many things that you can do to help prevent heart disease and stroke:  Have your blood pressure checked at least every 1-2 years. High blood pressure causes heart disease and increases the risk of stroke.  If you are 55-79 years old, ask your health care provider if you should take aspirin to prevent a heart attack or a stroke.  Do not use any tobacco products, including cigarettes, chewing tobacco, or electronic cigarettes. If you need help quitting, ask your health care provider.  It is important to eat a healthy diet and maintain a healthy weight.  Be sure to include plenty of vegetables, fruits, low-fat dairy products, and lean protein.  Avoid eating foods that are high in solid fats, added sugars, or salt (sodium).  Get regular exercise. This is one of the most important things that you can do for your health.  Try to exercise for at least 150 minutes each week. The type of exercise that you do should increase your heart rate and make you sweat. This is known as moderate-intensity exercise.  Try to do strengthening exercises at least twice each week. Do these in addition to the moderate-intensity exercise.  Know your numbers. Ask your health care provider to check your cholesterol and your blood glucose. Continue to have your blood tested as directed by your health care provider.     What should I know about cancer screening?  There are several types of cancer. Take  the following steps to reduce your risk and to catch any cancer development as early as possible.  Breast Cancer  Practice breast self-awareness.  This means understanding how your breasts normally appear and feel.  It also means doing regular breast self-exams. Let your health care provider know about any changes, no matter how small.  If you are 40 or older, have a clinician do a breast exam (clinical breast exam or CBE) every year. Depending on your age, family history, and medical history, it may be recommended that you also have a yearly breast X-ray (mammogram).  If you have a family history of breast cancer, talk with your health care provider about genetic screening.  If you are at high risk for breast cancer, talk with your health care provider about having an MRI and a mammogram every year.  Breast cancer (BRCA) gene test is recommended for women who have family members with BRCA-related cancers. Results of the assessment will determine the need for genetic counseling and BRCA1 and for BRCA2 testing. BRCA-related cancers include these types:  Breast. This occurs in males or females.  Ovarian.  Tubal. This may also be called fallopian tube cancer.  Cancer of the abdominal or pelvic lining (peritoneal cancer).  Prostate.  Pancreatic.     Cervical, Uterine, and Ovarian Cancer  Your health care provider may recommend that you be screened regularly for cancer of the pelvic organs. These include your ovaries, uterus, and vagina. This screening involves a pelvic exam, which includes checking for microscopic changes to the surface of your cervix (Pap test).  For women ages 21-65, health care providers may recommend a pelvic exam and a Pap test every three years. For women ages 30-65, they may recommend the Pap test and pelvic exam, combined with testing for human papilloma virus (HPV), every five years. Some types of HPV increase your risk of cervical cancer. Testing for HPV may also be done on women of any age  who have unclear Pap test results.  Other health care providers may not recommend any screening for nonpregnant women who are considered low risk for pelvic cancer and have no symptoms. Ask your health care provider if a screening pelvic exam is right for you.  If you have had past treatment for cervical cancer or a condition that could lead to cancer, you need Pap tests and screening for cancer for at least 20 years after your treatment. If Pap tests have been discontinued for you, your risk factors (such as having a new sexual partner) need to be reassessed to determine if you should start having screenings again. Some women have medical problems that increase the chance of getting cervical cancer. In these cases, your health care provider may recommend that you have screening and Pap tests more often.  If you have a family history of uterine cancer or ovarian cancer, talk with your health care provider about genetic screening.  If you have vaginal bleeding after reaching menopause, tell your health care provider.  There are currently no reliable tests available to screen for ovarian cancer.     Lung Cancer  Lung cancer screening is recommended for adults 55-80 years old who are at high risk for lung cancer because of a history of smoking. A yearly low-dose CT scan of the lungs is recommended if you:  Currently smoke.  Have a history of at least 30 pack-years of smoking and you currently smoke or have quit within the past 15 years. A pack-year is smoking an average of one pack of cigarettes per day for one year.     Yearly screening should:  Continue until it has been 15 years since you quit.  Stop if you develop a health problem that would prevent you from having lung cancer treatment.     Colorectal Cancer  This type of cancer can be detected and can often be prevented.  Routine colorectal cancer screening usually begins at age 50 and continues through age 75.  If you have risk factors for colon cancer, your  health care provider may recommend that you be screened at an earlier age.  If you have a family history of colorectal cancer, talk with your health care provider about genetic screening.  Your health care provider may also recommend using home test kits to check for hidden blood in your stool.  A small camera at the end of a tube can be used to examine your colon directly (sigmoidoscopy or colonoscopy). This is done to check for the earliest forms of colorectal cancer.  Direct examination of the colon should be repeated every 5-10 years until age 75. However, if early forms of precancerous polyps or small growths are found or if you have a family history or genetic risk for colorectal cancer, you may need to be screened more often.     Skin Cancer  Check your skin from head to toe regularly.  Monitor any moles. Be sure to tell your health care provider:  About any new moles or changes in moles, especially if there is a change in a mole's shape or color.  If you have a mole that is larger than the size of a pencil eraser.  If any of your family members has a history of skin cancer, especially at a young age, talk with your health care provider about genetic screening.  Always use sunscreen. Apply sunscreen liberally and repeatedly throughout the day.  Whenever you are outside, protect yourself by wearing long sleeves, pants, a wide-brimmed hat, and sunglasses.     What should I know about osteoporosis?  Osteoporosis is a condition in which bone destruction happens more quickly than new bone creation. After menopause, you may be at an increased risk for osteoporosis. To help prevent osteoporosis or the bone fractures that can happen because of osteoporosis, the following is recommended:  If you are 19-50 years old, get at least 1,000 mg of calcium and at least 600 mg of vitamin D per day.  If you are older than age 50 but younger than age 70, get at least 1,200 mg of calcium and at least 600 mg of vitamin D per  day.  If you are older than age 70, get at least 1,200 mg of calcium and at least 800 mg of vitamin D per day.     Smoking and excessive alcohol intake increase the risk of osteoporosis. Eat foods that are rich in calcium and vitamin D, and do weight-bearing exercises several times each week as directed by your health care provider.  What should I know about how menopause affects my mental health?  Depression may occur at any age, but it is more common as you become older. Common symptoms of depression include:  Low or sad mood.  Changes in sleep patterns.  Changes in appetite or eating patterns.  Feeling an overall lack of motivation or enjoyment of activities that you previously enjoyed.  Frequent crying spells.     Talk with your health care provider if you think that you are experiencing depression.  What should I know about immunizations?  It is important that you get and maintain your immunizations. These include:  Tetanus, diphtheria, and pertussis (Tdap) booster vaccine.  Influenza every year before the flu season begins.  Pneumonia vaccine.  Shingles vaccine.     Your health care provider may also recommend other immunizations.  This information is not intended to replace advice given to you by your health care provider. Make sure you discuss any questions you have with your health care provider.  Document Released: 02/09/2007 Document Revised: 07/07/2017 Document Reviewed: 09/20/2016  [employer] Patient Education © 2018 [employer]         Follow Up: 6 months  Patient was given instructions and counseling regarding her condition or for health maintenance advice. Please see specific information pulled into the AVS if appropriate.

## 2023-09-28 ENCOUNTER — LAB (OUTPATIENT)
Dept: INTERNAL MEDICINE | Facility: CLINIC | Age: 73
End: 2023-09-28
Payer: MEDICARE

## 2023-09-28 DIAGNOSIS — A09 DIARRHEA OF INFECTIOUS ORIGIN: ICD-10-CM

## 2023-09-28 LAB
C DIFF TOX GENS STL QL NAA+PROBE: NOT DETECTED
EXPIRATION DATE 2: ABNORMAL
EXPIRATION DATE 3: ABNORMAL
EXPIRATION DATE: ABNORMAL
GASTROCULT GAST QL: NEGATIVE
HEMOCCULT SP2 STL QL: POSITIVE
HEMOCCULT SP3 STL QL: POSITIVE
Lab: ABNORMAL
WBC STL QL MICRO: NORMAL

## 2023-09-28 PROCEDURE — 87427 SHIGA-LIKE TOXIN AG IA: CPT | Performed by: NURSE PRACTITIONER

## 2023-09-28 PROCEDURE — 87046 STOOL CULTR AEROBIC BACT EA: CPT | Performed by: NURSE PRACTITIONER

## 2023-09-28 PROCEDURE — 82274 ASSAY TEST FOR BLOOD FECAL: CPT | Performed by: NURSE PRACTITIONER

## 2023-09-28 PROCEDURE — 87329 GIARDIA AG IA: CPT | Performed by: NURSE PRACTITIONER

## 2023-09-28 PROCEDURE — 87205 SMEAR GRAM STAIN: CPT | Performed by: NURSE PRACTITIONER

## 2023-09-28 PROCEDURE — 87425 ROTAVIRUS AG IA: CPT | Performed by: NURSE PRACTITIONER

## 2023-09-28 PROCEDURE — 87493 C DIFF AMPLIFIED PROBE: CPT | Performed by: NURSE PRACTITIONER

## 2023-09-28 PROCEDURE — 87045 FECES CULTURE AEROBIC BACT: CPT | Performed by: NURSE PRACTITIONER

## 2023-09-29 LAB — RV AG STL QL IA: NEGATIVE

## 2023-10-01 LAB — G LAMBLIA AG STL QL IA: NEGATIVE

## 2023-10-05 ENCOUNTER — TELEPHONE (OUTPATIENT)
Dept: PAIN MEDICINE | Facility: CLINIC | Age: 73
End: 2023-10-05
Payer: MEDICARE

## 2023-10-05 NOTE — TELEPHONE ENCOUNTER
CALLED TO SEE HOW PATIENT WAS DOING AFTER PROCEDURE, AND SEE WHAT PAIN WAS FROM 1-10 AND TO LET HER KNOW TO FOLLOW UP AS NEEDED. UNABLE TO LVM

## 2023-10-09 DIAGNOSIS — R19.5 HEME POSITIVE STOOL: Primary | ICD-10-CM

## 2023-12-14 RX ORDER — SODIUM PICOSULFATE, MAGNESIUM OXIDE, AND ANHYDROUS CITRIC ACID 10; 3.5; 12 MG/160ML; G/160ML; G/160ML
350 LIQUID ORAL TAKE AS DIRECTED
Qty: 350 ML | Refills: 0 | Status: SHIPPED | OUTPATIENT
Start: 2023-12-14

## 2023-12-21 ENCOUNTER — OUTSIDE FACILITY SERVICE (OUTPATIENT)
Dept: GASTROENTEROLOGY | Facility: CLINIC | Age: 73
End: 2023-12-21
Payer: MEDICARE

## 2023-12-21 PROCEDURE — 88305 TISSUE EXAM BY PATHOLOGIST: CPT

## 2023-12-22 ENCOUNTER — LAB REQUISITION (OUTPATIENT)
Dept: LAB | Facility: HOSPITAL | Age: 73
End: 2023-12-22
Payer: MEDICARE

## 2023-12-22 DIAGNOSIS — K64.8 OTHER HEMORRHOIDS: ICD-10-CM

## 2023-12-22 DIAGNOSIS — K57.30 DIVERTICULOSIS OF LARGE INTESTINE WITHOUT PERFORATION OR ABSCESS WITHOUT BLEEDING: ICD-10-CM

## 2023-12-22 DIAGNOSIS — R19.5 OTHER FECAL ABNORMALITIES: ICD-10-CM

## 2023-12-22 DIAGNOSIS — Z12.11 ENCOUNTER FOR SCREENING FOR MALIGNANT NEOPLASM OF COLON: ICD-10-CM

## 2023-12-22 DIAGNOSIS — D12.8 BENIGN NEOPLASM OF RECTUM: ICD-10-CM

## 2023-12-22 DIAGNOSIS — Q43.8 OTHER SPECIFIED CONGENITAL MALFORMATIONS OF INTESTINE: ICD-10-CM

## 2023-12-26 ENCOUNTER — TELEPHONE (OUTPATIENT)
Dept: PAIN MEDICINE | Facility: CLINIC | Age: 73
End: 2023-12-26
Payer: MEDICARE

## 2023-12-26 NOTE — TELEPHONE ENCOUNTER
----- Message from RAYMOND Lorenzana sent at 12/26/2023  8:10 AM EST -----  Regarding: FW: Appointment Request  Contact: [phone]  Next available appt   ----- Message -----  From: Carleen Payne  Sent: 12/24/2023  12:03 PM EST  To: [facility]  Subject: Appointment Request                              Appointment Request From: Carleen Payne    With Provider: Flores Stratton [[facility]]    Preferred Date Range: 12/26/2023 – 1/12/2024    Preferred Times: Any Time    Reason for visit: Follow-up    Comments:  Flores!  I am in need of an epidural whenever you can get me in!  Thank you, Carleen Payne

## 2023-12-27 LAB — REF LAB TEST METHOD: NORMAL

## 2024-01-09 ENCOUNTER — OFFICE VISIT (OUTPATIENT)
Dept: INTERNAL MEDICINE | Facility: CLINIC | Age: 74
End: 2024-01-09
Payer: MEDICARE

## 2024-01-09 VITALS
SYSTOLIC BLOOD PRESSURE: 126 MMHG | HEIGHT: 61 IN | BODY MASS INDEX: 22.36 KG/M2 | DIASTOLIC BLOOD PRESSURE: 92 MMHG | TEMPERATURE: 98 F | WEIGHT: 118.4 LBS | HEART RATE: 56 BPM | RESPIRATION RATE: 16 BRPM

## 2024-01-09 DIAGNOSIS — R10.13 EPIGASTRIC PAIN: Primary | ICD-10-CM

## 2024-01-09 DIAGNOSIS — L30.9 ECZEMA, UNSPECIFIED TYPE: ICD-10-CM

## 2024-01-09 DIAGNOSIS — Z12.31 ENCOUNTER FOR SCREENING MAMMOGRAM FOR BREAST CANCER: ICD-10-CM

## 2024-01-09 DIAGNOSIS — L82.1 SEBORRHEIC KERATOSIS: ICD-10-CM

## 2024-01-09 DIAGNOSIS — R19.7 DIARRHEA, UNSPECIFIED TYPE: ICD-10-CM

## 2024-01-09 DIAGNOSIS — I10 ESSENTIAL HYPERTENSION: ICD-10-CM

## 2024-01-09 DIAGNOSIS — R10.13 EPIGASTRIC PAIN: ICD-10-CM

## 2024-01-09 RX ORDER — LOSARTAN POTASSIUM 50 MG/1
50 TABLET ORAL DAILY
Qty: 90 TABLET | Refills: 1 | Status: SHIPPED | OUTPATIENT
Start: 2024-01-09 | End: 2024-01-09 | Stop reason: SDUPTHER

## 2024-01-09 RX ORDER — METOPROLOL SUCCINATE 50 MG/1
50 TABLET, EXTENDED RELEASE ORAL DAILY
Qty: 90 TABLET | Refills: 1 | Status: SHIPPED | OUTPATIENT
Start: 2024-01-09

## 2024-01-09 RX ORDER — PANTOPRAZOLE SODIUM 40 MG/1
40 TABLET, DELAYED RELEASE ORAL DAILY
Qty: 90 TABLET | OUTPATIENT
Start: 2024-01-09

## 2024-01-09 RX ORDER — PANTOPRAZOLE SODIUM 40 MG/1
40 TABLET, DELAYED RELEASE ORAL DAILY
Qty: 30 TABLET | Refills: 0 | Status: SHIPPED | OUTPATIENT
Start: 2024-01-09

## 2024-01-09 RX ORDER — METOPROLOL SUCCINATE 50 MG/1
50 TABLET, EXTENDED RELEASE ORAL DAILY
Qty: 90 TABLET | Refills: 1 | Status: SHIPPED | OUTPATIENT
Start: 2024-01-09 | End: 2024-01-09 | Stop reason: SDUPTHER

## 2024-01-09 RX ORDER — LOSARTAN POTASSIUM 50 MG/1
50 TABLET ORAL DAILY
Qty: 90 TABLET | Refills: 1 | Status: SHIPPED | OUTPATIENT
Start: 2024-01-09

## 2024-01-09 NOTE — PROGRESS NOTES
Patient Name: Carleen Payne  : 1950   MRN: [id number]     Chief Complaint:    Chief Complaint   Patient presents with    Hypertension     Follow up       History of Present Illness:       Carleen Payne patient is a 73-year-old female who presents for evaluation of multiple medical concerns. She had a colonoscopy 2023 revealing internal hemorrhoids, diverticula in the left colon and a 3 mm polyp in the rectum. The polyp was sessile and was removed. The sigmoid colon was mildly tortuous.     Diarrhea  Since her last visit she has continued to experience diarrhea. It is mostly liquid diarrhea with very few episodes of formed stool, but she has seen some mucus in her bowel. Her stools are dark. She does have some bloating, cramping and epigastric pain. No trouble swallowing, burning, reflux, nausea, vomiting, coffee ground emesis, nighttime cough or fever. She denies any recent travel. When her symptoms started during the summer she lost weight, but it has been stable since. She does not feel like she has parasites because it does not feel the same. An EGD has not been completed. Her epigastric pain has been going on since 2023 or 2023. She has not noticed any food triggers. The patient had positive Giardia in 2023 and 2023 and was treated both times. We rechecked in 2023 and the Giardia was negative. We also checked for rotavirus, leukocytes, C. difficile, and Yersinia which were negative. H. pylori was tested in 2023 and was negative.    Rash  A couple of months ago she noticed an itchy spot on her back that is like a rash and will not resolve.   Upper back with a warty skin lesion, she would like to have checked. She has tried topical Benadryl to the area.    Gynecology  She has not had a Pap smear in a long time. No history of abnormal pap smears. Her breast got smaller when she lost weight. Due for annual mammogram in 2024.    Hypertension  Metoprolol XL 50 mg once a day and  "losartan 50 mg are taken for her blood pressure. No chest pain, shortness of breath, palpitations.     Shingles  The patient has had shingles in the past.    Family history  She has a family history of ovarian cancer.    Medications  The patient is currently taking medication for cholesterol.    Immunizations  Declined to receive influenza, shingles, and COVID-19 vaccine.      Subjective     Review of System: Review of Systems   Gastrointestinal:  Positive for abdominal pain and diarrhea. Negative for nausea and vomiting.        Medications:     Current Outpatient Medications:     atorvastatin (LIPITOR) 10 MG tablet, Take 1 tablet by mouth Daily., Disp: 90 tablet, Rfl: 1    Calcium 150 MG tablet, Take 150 mg by mouth Daily., Disp: , Rfl:     Cholecalciferol (VITAMIN D) 1000 UNITS tablet, Take 1 tablet by mouth Daily., Disp: , Rfl:     EPINEPHrine (EPIPEN) 0.3 MG/0.3ML solution auto-injector injection, INJECT INTRAMUSCULARLY AS NEEDED FOR ALLERGIC REACTION, Disp: , Rfl:     loratadine (CLARITIN) 10 MG tablet, Take 1 tablet by mouth Daily., Disp: 90 tablet, Rfl: 1    losartan (COZAAR) 50 MG tablet, Take 1 tablet by mouth Daily., Disp: 90 tablet, Rfl: 1    metoprolol succinate XL (TOPROL-XL) 50 MG 24 hr tablet, Take 1 tablet by mouth Daily., Disp: 90 tablet, Rfl: 1    pantoprazole (Protonix) 40 MG EC tablet, Take 1 tablet by mouth Daily., Disp: 30 tablet, Rfl: 0    Allergies:   Allergies   Allergen Reactions    Erythromycin Base Nausea And Vomiting    Gabapentin Swelling     Body swelling    Isothiazolinone Chloride Rash       Objective     Physical Exam:   Vital Signs:   Vitals:    01/09/24 0917   BP: 126/92   BP Location: Right arm   Patient Position: Sitting   Cuff Size: Adult   Pulse: 56   Resp: 16   Temp: 98 °F (36.7 °C)   TempSrc: Infrared   Weight: 53.7 kg (118 lb 6.4 oz)   Height: 154.9 cm (61\")   PainSc:   5     Body mass index is 22.37 kg/m².   BMI is within normal parameters. No other follow-up for BMI " required.         Physical Exam  Constitutional:       General: She is not in acute distress.     Appearance: She is not ill-appearing.   HENT:      Head: Normocephalic.   Cardiovascular:      Rate and Rhythm: Normal rate and regular rhythm.      Heart sounds: Normal heart sounds. No murmur heard.  Pulmonary:      Breath sounds: Normal breath sounds.   Abdominal:      General: Bowel sounds are normal.      Tenderness: There is no abdominal tenderness.   Skin:     Comments: On her middle back, she has a large, almost 1 cm seborrheic keratosis and a itchy red spot of eczema.   Neurological:      General: No focal deficit present.      Mental Status: She is oriented to person, place, and time.   Psychiatric:         Mood and Affect: Mood normal.         Assessment / Plan      Assessment/Plan:   Diagnoses and all orders for this visit:    1. Epigastric pain (Primary)  -     H. Pylori Antigen, Stool - Stool, Per Rectum; Future  -     pantoprazole (Protonix) 40 MG EC tablet; Take 1 tablet by mouth Daily.  Dispense: 30 tablet; Refill: 0    2. Encounter for screening mammogram for breast cancer  -     Mammo Screening Digital Tomosynthesis Bilateral With CAD; Future    3. Eczema, unspecified type    4. Diarrhea, unspecified type  -     Giardia Antigen - Stool, Per Rectum; Future  -     Clostridioides difficile Toxin, PCR - Stool, Per Rectum; Future  -     Ova & Parasite Examination - Stool, Per Rectum; Future    5. Essential hypertension  Assessment & Plan:  Hypertension is unchanged.  Continue current medications.  Decrease salt.   Blood pressure will be reassessed  6 weeks .    Orders:    -     losartan (COZAAR) 50 MG tablet; Take 1 tablet by mouth Daily.  Dispense: 90 tablet; Refill: 1  -     metoprolol succinate XL (TOPROL-XL) 50 MG 24 hr tablet; Take 1 tablet by mouth Daily.  Dispense: 90 tablet; Refill: 1    6. Seborrheic keratosis       Problems Addressed this Visit          Cardiac and Vasculature    Essential  hypertension     Hypertension is unchanged.  Continue current medications.  Decrease salt.   Blood pressure will be reassessed  6 weeks .         Relevant Medications    losartan (COZAAR) 50 MG tablet    metoprolol succinate XL (TOPROL-XL) 50 MG 24 hr tablet     Other Visit Diagnoses       Epigastric pain    -  Primary    Relevant Medications    pantoprazole (Protonix) 40 MG EC tablet    Other Relevant Orders    H. Pylori Antigen, Stool - Stool, Per Rectum    Encounter for screening mammogram for breast cancer        Relevant Orders    Mammo Screening Digital Tomosynthesis Bilateral With CAD    Eczema, unspecified type        Diarrhea, unspecified type        Relevant Orders    Giardia Antigen - Stool, Per Rectum    Clostridioides difficile Toxin, PCR - Stool, Per Rectum    Ova & Parasite Examination - Stool, Per Rectum    Seborrheic keratosis              Diagnoses         Codes Comments    Epigastric pain    -  Primary ICD-10-CM: R10.13  ICD-9-CM: 789.06     Encounter for screening mammogram for breast cancer     ICD-10-CM: Z12.31  ICD-9-CM: V76.12     Eczema, unspecified type     ICD-10-CM: L30.9  ICD-9-CM: 692.9     Diarrhea, unspecified type     ICD-10-CM: R19.7  ICD-9-CM: 787.91     Essential hypertension     ICD-10-CM: I10  ICD-9-CM: 401.9     Seborrheic keratosis     ICD-10-CM: L82.1  ICD-9-CM: 702.19            1. Epigastric pain  She has been treated for Giardia with  tinidazole, albendazole, and metronidazole. I will evaluate for H. pylori. A trial of pantoprazole was given to the patient which she will take in the morning on an empty stomach 30 minutes to 1 hour before she eats. I will recheck the C. difficile, Giardia, ova and parasite. We discussed red flags and how to follow up for them. The patient will follow up in 6 weeks. EGD if no improvement.     2. Eczema  We discussed several options. She can try over the counter hydrocortisone 1 percent for 2 weeks for her eczema patch. She can also use  Eucerin body cream daily for eczema or a similar product over the counter. Advised to moisturize the area when she is fresh out of the shower.    3. Seborrheic keratosis  We will monitor.    4. Hypertension  We will continue her current regimen of her blood pressure medicine.     5. Diarrhea  Will consider trial of Xifaxin if no improvement. Discussed referral to GI; she declined at this time and would like to avoid a specialist unless worsening.     Follow Up:   Return in about 6 weeks (around 2/20/2024) for Recheck.    RAYMOND Rivera  [facility] [place], [place] [facility]    Transcribed from ambient dictation for RAYMOND Rivera by Rosalind Rodriguez.  01/09/24   10:23 EST    Patient or patient representative verbalized consent to the visit recording.  I have personally performed the services described in this document as transcribed by the above individual, and it is both accurate and complete.

## 2024-01-09 NOTE — ASSESSMENT & PLAN NOTE
Hypertension is unchanged.  Continue current medications.  Decrease salt.   Blood pressure will be reassessed  6 weeks .

## 2024-01-10 ENCOUNTER — LAB (OUTPATIENT)
Dept: INTERNAL MEDICINE | Facility: CLINIC | Age: 74
End: 2024-01-10
Payer: MEDICARE

## 2024-01-10 DIAGNOSIS — R10.13 EPIGASTRIC PAIN: ICD-10-CM

## 2024-01-10 DIAGNOSIS — R19.7 DIARRHEA, UNSPECIFIED TYPE: ICD-10-CM

## 2024-01-10 LAB — C DIFF TOX GENS STL QL NAA+PROBE: NOT DETECTED

## 2024-01-10 PROCEDURE — 87493 C DIFF AMPLIFIED PROBE: CPT | Performed by: NURSE PRACTITIONER

## 2024-01-10 PROCEDURE — 87209 SMEAR COMPLEX STAIN: CPT | Performed by: NURSE PRACTITIONER

## 2024-01-10 PROCEDURE — 87329 GIARDIA AG IA: CPT | Performed by: NURSE PRACTITIONER

## 2024-01-10 PROCEDURE — 87338 HPYLORI STOOL AG IA: CPT | Performed by: NURSE PRACTITIONER

## 2024-01-10 PROCEDURE — 87177 OVA AND PARASITES SMEARS: CPT | Performed by: NURSE PRACTITIONER

## 2024-01-12 LAB
G LAMBLIA AG STL QL IA: NEGATIVE
H PYLORI AG STL QL IA: NEGATIVE

## 2024-01-17 LAB
O+P SPEC MICRO: NORMAL
O+P STL TRI STN: NORMAL

## 2024-01-31 ENCOUNTER — OFFICE VISIT (OUTPATIENT)
Dept: PAIN MEDICINE | Facility: CLINIC | Age: 74
End: 2024-01-31
Payer: MEDICARE

## 2024-01-31 VITALS — WEIGHT: 117.1 LBS | HEIGHT: 61 IN | BODY MASS INDEX: 22.11 KG/M2

## 2024-01-31 DIAGNOSIS — M54.16 LUMBAR RADICULOPATHY: ICD-10-CM

## 2024-01-31 DIAGNOSIS — M48.062 LUMBAR STENOSIS WITH NEUROGENIC CLAUDICATION: Primary | ICD-10-CM

## 2024-01-31 DIAGNOSIS — M51.36 DDD (DEGENERATIVE DISC DISEASE), LUMBAR: ICD-10-CM

## 2024-01-31 DIAGNOSIS — M43.16 SPONDYLOLISTHESIS OF LUMBAR REGION: ICD-10-CM

## 2024-01-31 DIAGNOSIS — M48.062 LUMBAR STENOSIS WITH NEUROGENIC CLAUDICATION: ICD-10-CM

## 2024-01-31 PROCEDURE — 1125F AMNT PAIN NOTED PAIN PRSNT: CPT | Performed by: NURSE PRACTITIONER

## 2024-01-31 PROCEDURE — 99214 OFFICE O/P EST MOD 30 MIN: CPT | Performed by: NURSE PRACTITIONER

## 2024-01-31 PROCEDURE — 1160F RVW MEDS BY RX/DR IN RCRD: CPT | Performed by: NURSE PRACTITIONER

## 2024-01-31 PROCEDURE — 1159F MED LIST DOCD IN RCRD: CPT | Performed by: NURSE PRACTITIONER

## 2024-01-31 NOTE — PROGRESS NOTES
"Chief Complaint: \"Lower back and right leg pain.\"       History of Present Illness:   Patient: Ms. Carleen Payne, 73 y.o. female originally referred by Dr. Priyanka Guardado in consultation for intractable chronic lower back pain. Patient reports a longstanding history of lower back pain, which began after a fall.  She has continued to respond exceptionally well to conservative and interventional pain management measures.  She was last seen on We last saw her on September 20, 2023 when she underwent a repeat right L4-L5 and right L5-S1 transforaminal epidural steroid injections, from which she reports experiencing about 80% pain relief and functional improvement lasting almost 4 months.  Around the end of December she began to experience recurrent right lower extremity pain.  Prior to that on January 25, 2023 when she underwent therapeutic right L4-L5 and right L5-S1 transforaminal epidural steroid injections, from which she reports experiencing 100% pain relief and functional improvement 7 months. She returns today for reevaluation of her chronic lower back and right lower extremity pain.   Pain description: constant pain with intermittent exacerbation, described as aching and burning sensation.   Radiation of pain: The lower back pain radiates into the posterior aspect of the right thigh, right calf to the ankle.    Pain intensity today: 6/10  Average pain intensity last week: 6/10  Pain intensity ranges from: 6/10 to 6/10  Aggravating factors: Pain increases with lying down, standing and walking.   Alleviating factors: Pain decreases with analgesics, sitting, and at times walking.  Associated symptoms:   Patient reports tingling, pain and weakness in the the right lower extremity.   Patient denies any new bladder or bowel problems.   Patient denies difficulties with her balance or recent falls  Pain interferes with regular activities, ADLs, and affects patient's quality of life  Pain interferes with general " activities (ability to walk, stand, transition from different positions), perform activities of daily living  Pain interferes with sleep causing sleep fragmentation   Muscle spasms  Stiffness      Review of previous therapies and additional medical records:  Carleen Payne has already failed the following measures, including:   Conservative measures: oral analgesics and physical therapy   Interventional measures: 02/26/2020: DxTx right L4-L5 and right L5-S1 TESI  08/17/2020: right L4-L5 and right L5-S1 TESI  05/12/2021: right L4-L5 and right L5-S1 TESI  09/22/2021: right L4-L5 and right L5-S1 TESI  02/28/2022: right L4-L5 and right L5-S1 TESI  08/24/2022: right L4-L5 and right L5-S1 TESI  01/25/2023: right L4-L5 and right L5-S1 TESI  09/30/2023: right L4-L5 and right L5-S1 TESI  Former patient of Dr. Talbert. She underwent epidural injections (RT L4-L5 transforaminals x4) with significant relief  Surgical measures: No history of lumbar spine surgery  Carleen Payne underwent neurosurgical consultation with Dr. Gomes on 06/16/2017, and was found not to be a surgical candidate.  Carleen Payne is a healthy adult other than her chronic pain and HTN.  In terms of current analgesics, Carleen Payne takes: aspirin PRN  I have reviewed Anatoly Report is consistent to medication reconciliation.     Global Pain Scale 06-20 2019 02-19 2020 03-23 2020 02-24 2022 09-12 2023 01-31 2024       Pain  1  12  10  10  9  12       Feelings  0  0  0  0  0  0       Clinical outcomes  0  0  2  2  3  6       Activities  0  8  0  2  0  0       GPS Total:  3  20  12  14  12  18          The [place] Back Pain Disability Scale  DATE 09-12 2023 01-31 2024               Sleep through the night 0  2               Turn over in bed 0  2               Get out of bed 0  1               Make your bed 0  0               Put on socks (pantyhose) 0  2               Ride in a car 0  3               Sit in a chair for several hours 4  4                Stand up for 20-30 minutes 0  3               Climb one flight of stairs 0  1               Walk a few blocks (200-300 yards)  0  2               Walk several miles 0  4               Run one block (about 50 yards) 3  5               Take food out of the refrigerator 0  0               Reach up to high shelves 0  0               Move a chair 3  1               Pull or push heavy doors 3  3               Bend over to clean the bathtub 1  0               Throw a ball 0  0               Carry two bags of groceries 3  0               Lift and carry a heavy suitcase 4  5               Total score 21  41                     Review of Diagnostic Studies:  MRI of the lumbar spine without contrast 8/28/2022: Imaging was reviewed.  Old T12 compression fracture with 50% loss of height.  Anterolisthesis of L4 on L5.  T11-12: Posterior disc bulge with bowing of the posterior wall of the superior endplate of the T12 vertebral body.  Borderline spinal stenosis.  T12 1: Small left paracentral disc protrusion with mild facet arthritis.  L1-L2: Mild facet hypertrophy without significant spinal canal neuroforaminal stenosis.  L2-L3: Facet hypertrophy without significant spinal canal neuroforaminal stenosis.  L3-L4: Facet hypertrophy with out significant spinal canal neuroforaminal stenosis.  L4-L5: 6 mm of anterolisthesis of L4 on L5, severe facet hypertrophy creating moderate to severe spinal stenosis, with moderate bilateral neural foraminal stenosis.  L4-L5: Disc bulge with severe facet hypertrophy and mild bilateral neuroforaminal stenosis, mild spinal canal stenosis.  Lumbar spine x-ray flexion-extension views 9/22/2022: Grade 1 anterior listhesis of L4 on L5 and mild anterior listhesis of L5 on S1.  There is dynamic instability most seen in flexion view.  Multilevel spondylosis most significant at L3-L4 and L4-5.      Review of Systems   Musculoskeletal:  Positive for arthralgias.   Allergic/Immunologic: Positive for  environmental allergies.   All other systems reviewed and are negative.        Patient Active Problem List   Diagnosis    Lumbar radiculopathy    Right sided sciatica    Leg pain    Lumbar stenosis with neurogenic claudication    History of compression fracture of vertebral column    Osteoporosis    Spondylolisthesis of lumbar region    DDD (degenerative disc disease), lumbar    Mild cognitive impairment    Impetigo    Preseptal cellulitis of right lower eyelid    Dermatitis    Edema of left eyelid    Essential hypertension       Past Medical History:   Diagnosis Date    Chronic pain disorder 03/2000    Hyperlipidemia     Hypertension     Joint pain 12 years    Leg pain 09/01/2016    Low back pain 2005    Epidural treatments as needed    Lumbar stenosis with neurogenic claudication 06/16/2019    Lumbosacral disc disease 16 years    Osteoporosis     Shingles          Past Surgical History:   Procedure Laterality Date    EPIDURAL BLOCK  5/12/2021    NO PAST SURGERIES           Family History   Problem Relation Age of Onset    Hypertension Mother     Hypertension Father     Stroke Father     Heart attack Brother     Thyroid disease Daughter     Stroke Maternal Grandmother     Cancer Maternal Grandfather     Cancer Paternal Grandmother     Ovarian cancer Paternal Grandmother         60'S    Arthritis Brother     Ovarian cancer Paternal Aunt         60'S    Ovarian cancer Cousin         PATERNAL 1ST COUSIN, 40'S    Ovarian cancer Cousin         PATERNAL 1ST COUSIN, 60'S    Breast cancer Neg Hx          Social History     Socioeconomic History    Marital status:    Tobacco Use    Smoking status: Never     Passive exposure: Never    Smokeless tobacco: Never   Vaping Use    Vaping Use: Never used   Substance and Sexual Activity    Alcohol use: Never    Drug use: Never    Sexual activity: Yes     Partners: Male           Current Outpatient Medications:     atorvastatin (LIPITOR) 10 MG tablet, Take 1 tablet by mouth  "Daily., Disp: 90 tablet, Rfl: 1    Calcium 150 MG tablet, Take 150 mg by mouth Daily., Disp: , Rfl:     Cholecalciferol (VITAMIN D) 1000 UNITS tablet, Take 1 tablet by mouth Daily., Disp: , Rfl:     EPINEPHrine (EPIPEN) 0.3 MG/0.3ML solution auto-injector injection, INJECT INTRAMUSCULARLY AS NEEDED FOR ALLERGIC REACTION, Disp: , Rfl:     loratadine (CLARITIN) 10 MG tablet, Take 1 tablet by mouth Daily., Disp: 90 tablet, Rfl: 1    losartan (COZAAR) 50 MG tablet, Take 1 tablet by mouth Daily., Disp: 90 tablet, Rfl: 1    metoprolol succinate XL (TOPROL-XL) 50 MG 24 hr tablet, Take 1 tablet by mouth Daily., Disp: 90 tablet, Rfl: 1    pantoprazole (Protonix) 40 MG EC tablet, Take 1 tablet by mouth Daily., Disp: 30 tablet, Rfl: 0      Allergies   Allergen Reactions    Erythromycin Base Nausea And Vomiting    Gabapentin Swelling     Body swelling    Isothiazolinone Chloride Rash         Ht 154.9 cm (61\")   Wt 53.1 kg (117 lb 1.6 oz)   BMI 22.13 kg/m²           Physical Exam:   Constitutional: Patient is oriented to person, place, and time. Patient appears well-developed and well-nourished.   HEENT: Head: Normocephalic and atraumatic. Eyes: Conjunctivae and lids are normal. Pupils: Equal, round, reactive to light.   Neck: Trachea normal. Neck supple. No JVD present.   Pulmonary Respiratory effort: No increased work of breathing or signs of respiratory distress.   Peripheral vascular exam: Femoral: right 2+, left 2+. Posterior tibialis: right 2+ and left 2+. Dorsalis pedis: right 2+ and left 2+. No edema.   Musculoskeletal   Gait and station: Gait evaluation demonstrated a normal gait   Lumbar spine: Passive and active range of motion are full and without pain. Extension, flexion, lateral flexion, rotation of the lumbar spine did not increase or reproduce pain. Lumbar facet joint loading maneuvers are negative.   Rodrigo test and Gaenslen's test are negative   Piriformis maneuvers are negative   Palpation of the bilateral " ischial tuberosities, unrevealing   Palpation of the bilateral greater trochanter, unrevealing   Examination of the Iliotibial band: unrevealing   Hip joints: The range of motion of the hip joints is full and without pain   Neurological: Patient is alert and oriented to person, place, and time. Speech: speech is normal. Cortical function: Normal mental status.   Reflex Scores:  Right patellar: 2+  Left patellar: 2+  Right Achilles: 2+  Left Achilles: 2+  Motor strength: 5/5  Motor Tone: normal tone.   Involuntary movements: none.   Superficial/Primitive Reflexes: primitive reflexes were absent.   Right Carcamo: absent  Left Carcamo: absent  Right ankle clonus: absent  Left ankle clonus: absent   Negative long tract signs. Straight leg raising test on the right elicits her posterior thigh pain. Femoral stretch sign is negative.   Sensation: No sensory loss. Sensory exam: intact to light touch, intact pain and temperature sensation, intact vibration sensation and normal proprioception.   Coordination: Normal finger to nose and heel to shin. Normal balance and negative Romberg's sign   Skin and subcutaneous tissue: Skin is warm and intact. No rash noted. No cyanosis.   Psychiatric: Judgment and insight: Normal. Orientation to person, place and time: Normal. Recent and remote memory: Intact. Mood and affect: Normal.       ASSESSMENT:   1. Lumbar stenosis with neurogenic claudication    2. Lumbar radiculopathy    3. Spondylolisthesis of lumbar region    4. DDD (degenerative disc disease), lumbar            PLAN/MEDICAL DECISION MAKING: Ms. Carlene Payne, 73 y.o. female reports a longstanding history of lower back pain.  Most recent MRI of the lumbar spine without contrast in revealed multilevel degenerative disc disease, at L4-L5 there is severe facet hypertrophy, a grade 1 anterior listhesis of L4 on L5, this is contributing to moderate to severe spinal canal stenosis and moderate bilateral neuroforaminal stenosis.   Lumbar spine x-rays and flexion-extension views revealed instability seen L4-L5, most in flexion view.  She has responded exceptionally well to previous transforaminal epidurals, as referenced above.  She continues to exercise, and has participated in physical therapy in the past.  I had a lengthy conversation with Ms. Carleen Payne regarding her chronic pain condition and potential therapeutic options including risks, benefits, alternative therapies, to name a few. Patient has failed to obtain pain relief with conservative measures, as referenced above. Patient experiences significant pain relief from interventional pain management measures, as referenced above. I have reviewed all available patient's medical records as well as previous therapies as referenced above.   Therefore, I have proposed the following plan:  1. Pharmacological measures: Reviewed. Discussed.   A. Patient takes aspirin PRN  2. Interventional pain management measures: Patient will be scheduled for repeat right L4-L5 and right L5-S1 transforaminal epidural steroid injections. We may repeat epidurals depending on patient's outcome, or facilitate NS follow-up with Dr Gomes.   3. Long-term rehabilitation efforts:  A. The patient does not have a history of falls. I did complete a risk assessment for falls  B. Patient will start a comprehensive physical therapy program for reconditioning, therapeutic exercise, upper body strengthening/posture correction, core strengthening, gait and balance training, neurodynamics, myofascial release, cupping and dry needling if pain recurs  C. Start an exercise program such as yoga, Pilates, Juan Daniel Chi and water therapy  D. Contrast therapy: Apply ice-packs for 15-20 minutes, followed by heating pads for 15-20 minutes to affected area   5. The patient has been instructed to contact my office with any questions or difficulties. The patient understands the plan and agrees to proceed accordingly.        Carleen Herreraye  Elmer reports a pain score of 6.  Given her pain assessment as noted, treatment options were discussed and the following options were decided upon as a follow-up plan to address the patient's pain: continuation of current treatment plan for pain, educational materials on pain management, home exercises and therapy, patient not interested in pharmacological measures, referral to Physical Therapy, steroid injections, and use of non-medical modalities (ice, heat, stretching and/or behavior modifications).               Patient Care Team:  Luna Isidro APRN as PCP - General (Nurse Practitioner)  Herve Philippe MD as Consulting Physician (Pain Medicine)  Flores Stratton APRN as Nurse Practitioner (Pain Medicine)     No orders of the defined types were placed in this encounter.        Future Appointments   Date Time Provider Department Center   2/20/2024 10:30 AM Luna Isidro APRN [facility]   3/19/2024 11:00 AM [facility] (SCREENING ROOM) [facility] RAYMOND Mensah

## 2024-02-05 ENCOUNTER — OUTSIDE FACILITY SERVICE (OUTPATIENT)
Dept: PAIN MEDICINE | Facility: CLINIC | Age: 74
End: 2024-02-05
Payer: MEDICARE

## 2024-02-05 DIAGNOSIS — R10.13 EPIGASTRIC PAIN: ICD-10-CM

## 2024-02-05 PROCEDURE — 64484 NJX AA&/STRD TFRM EPI L/S EA: CPT | Performed by: ANESTHESIOLOGY

## 2024-02-05 PROCEDURE — 64483 NJX AA&/STRD TFRM EPI L/S 1: CPT | Performed by: ANESTHESIOLOGY

## 2024-02-05 RX ORDER — PANTOPRAZOLE SODIUM 40 MG/1
40 TABLET, DELAYED RELEASE ORAL DAILY
Qty: 90 TABLET | Refills: 0 | Status: SHIPPED | OUTPATIENT
Start: 2024-02-05

## 2024-02-20 ENCOUNTER — OFFICE VISIT (OUTPATIENT)
Dept: INTERNAL MEDICINE | Facility: CLINIC | Age: 74
End: 2024-02-20
Payer: MEDICARE

## 2024-02-20 VITALS
WEIGHT: 116.56 LBS | TEMPERATURE: 97.8 F | HEART RATE: 72 BPM | SYSTOLIC BLOOD PRESSURE: 128 MMHG | BODY MASS INDEX: 22.02 KG/M2 | RESPIRATION RATE: 20 BRPM | DIASTOLIC BLOOD PRESSURE: 70 MMHG

## 2024-02-20 DIAGNOSIS — I10 PRIMARY HYPERTENSION: ICD-10-CM

## 2024-02-20 DIAGNOSIS — Z86.19 HISTORY OF GIARDIA INFECTION: ICD-10-CM

## 2024-02-20 DIAGNOSIS — R10.13 EPIGASTRIC PAIN: ICD-10-CM

## 2024-02-20 DIAGNOSIS — R19.7 DIARRHEA, UNSPECIFIED TYPE: Primary | ICD-10-CM

## 2024-02-20 PROCEDURE — 99213 OFFICE O/P EST LOW 20 MIN: CPT | Performed by: NURSE PRACTITIONER

## 2024-02-20 PROCEDURE — 1160F RVW MEDS BY RX/DR IN RCRD: CPT | Performed by: NURSE PRACTITIONER

## 2024-02-20 PROCEDURE — 3078F DIAST BP <80 MM HG: CPT | Performed by: NURSE PRACTITIONER

## 2024-02-20 PROCEDURE — 1159F MED LIST DOCD IN RCRD: CPT | Performed by: NURSE PRACTITIONER

## 2024-02-20 PROCEDURE — 3074F SYST BP LT 130 MM HG: CPT | Performed by: NURSE PRACTITIONER

## 2024-02-28 ENCOUNTER — OFFICE VISIT (OUTPATIENT)
Dept: GASTROENTEROLOGY | Facility: CLINIC | Age: 74
End: 2024-02-28
Payer: MEDICARE

## 2024-02-28 VITALS
SYSTOLIC BLOOD PRESSURE: 124 MMHG | HEIGHT: 61 IN | OXYGEN SATURATION: 98 % | HEART RATE: 72 BPM | TEMPERATURE: 97.7 F | BODY MASS INDEX: 22.92 KG/M2 | DIASTOLIC BLOOD PRESSURE: 82 MMHG | WEIGHT: 121.4 LBS

## 2024-02-28 DIAGNOSIS — G89.29 CHRONIC ABDOMINAL PAIN: Primary | ICD-10-CM

## 2024-02-28 DIAGNOSIS — R10.9 CHRONIC ABDOMINAL PAIN: Primary | ICD-10-CM

## 2024-02-28 PROCEDURE — 99214 OFFICE O/P EST MOD 30 MIN: CPT | Performed by: INTERNAL MEDICINE

## 2024-02-28 PROCEDURE — 3074F SYST BP LT 130 MM HG: CPT | Performed by: INTERNAL MEDICINE

## 2024-02-28 PROCEDURE — 1159F MED LIST DOCD IN RCRD: CPT | Performed by: INTERNAL MEDICINE

## 2024-02-28 PROCEDURE — 3079F DIAST BP 80-89 MM HG: CPT | Performed by: INTERNAL MEDICINE

## 2024-02-28 PROCEDURE — 1160F RVW MEDS BY RX/DR IN RCRD: CPT | Performed by: INTERNAL MEDICINE

## 2024-02-28 NOTE — PROGRESS NOTES
PCP: Luna Isidro APRN    No chief complaint on file.      History of Present Illness:   Carleen Payne is a 73 y.o. female who presents to GI clinic as a follow up for GI symptoms. Last seen at colonoscopy 12/21/23. Last clinic visit 7/2019.  Continues to have luq intermittent pain worse with eating. No dysphagia or vomiting.    Past Medical History:   Diagnosis Date    Chronic pain disorder 03/2000    Hyperlipidemia     Hypertension     Joint pain 12 years    Leg pain 09/01/2016    Low back pain 2005    Epidural treatments as needed    Lumbar stenosis with neurogenic claudication 06/16/2019    Lumbosacral disc disease 16 years    Osteoporosis     Shingles        Past Surgical History:   Procedure Laterality Date    EPIDURAL BLOCK  5/12/2021    NO PAST SURGERIES           Current Outpatient Medications:     atorvastatin (LIPITOR) 10 MG tablet, Take 1 tablet by mouth Daily., Disp: 90 tablet, Rfl: 1    Calcium 150 MG tablet, Take 150 mg by mouth Daily., Disp: , Rfl:     Cholecalciferol (VITAMIN D) 1000 UNITS tablet, Take 1 tablet by mouth Daily., Disp: , Rfl:     EPINEPHrine (EPIPEN) 0.3 MG/0.3ML solution auto-injector injection, INJECT INTRAMUSCULARLY AS NEEDED FOR ALLERGIC REACTION, Disp: , Rfl:     loratadine (CLARITIN) 10 MG tablet, Take 1 tablet by mouth Daily., Disp: 90 tablet, Rfl: 1    losartan (COZAAR) 50 MG tablet, Take 1 tablet by mouth Daily., Disp: 90 tablet, Rfl: 1    metoprolol succinate XL (TOPROL-XL) 50 MG 24 hr tablet, Take 1 tablet by mouth Daily., Disp: 90 tablet, Rfl: 1    Allergies   Allergen Reactions    Erythromycin Base Nausea And Vomiting    Gabapentin Swelling     Body swelling    Isothiazolinone Chloride Rash       Family History   Problem Relation Age of Onset    Hypertension Mother     Hypertension Father     Stroke Father     Heart attack Brother     Thyroid disease Daughter     Stroke Maternal Grandmother     Cancer Maternal Grandfather     Cancer Paternal Grandmother      Ovarian cancer Paternal Grandmother         60'S    Arthritis Brother     Ovarian cancer Paternal Aunt         60'S    Ovarian cancer Cousin         PATERNAL 1ST COUSIN, 40'S    Ovarian cancer Cousin         PATERNAL 1ST COUSIN, 60'S    Breast cancer Neg Hx        Social History     Socioeconomic History    Marital status:    Tobacco Use    Smoking status: Never     Passive exposure: Never    Smokeless tobacco: Never   Vaping Use    Vaping Use: Never used   Substance and Sexual Activity    Alcohol use: Never    Drug use: Never    Sexual activity: Yes     Partners: Male       Review of Systems    A complete 12 point ros was asked and is negative except for that mentioned above.  In particular:  No fever  No rash  No increased arthralgias  No worsening edema  No cough  No dyspnea  No chest pain    There were no vitals filed for this visit.    Physical Exam  General: well developed, well nourished  A+O x 3 NAD  HEENT: NCAT, pupils equal appearing, sclera appear white  NECK: full ROM  Respiratory: symmetric chest rise, normal effort, normal work of breathing, no overt rales  Abdomen: non-distended  Skin: normal color, no jaundice  Neuro: no tremor, no facial droop  Psych: normal mood and affect      Assessment/Plan  Workup: colonoscopy, 12/2023: tortuous colon, diverticulosis left sided, 3 mm polyp: benign, repeat 2028  Last seen for LUQ pain 2019, luq discomfort, 1 bm q 2 weeks on average which is not bothersome to her, recommended miralax at that time.    1.) Chronic luq pain  Will rx prn bentyl, suspect functional vs abdominal/chest wall.  - schedule egd    2.) h/o giardia  Completed antibiotic therapy  Recommend egd to assess duodenum    3.) h/o constipation  Has used miralax in the past, continue current diet therapy    4.) HCM  Repeat colonoscopy 2028    Stephen Crockett MD  2/28/2024

## 2024-02-29 ENCOUNTER — OUTSIDE FACILITY SERVICE (OUTPATIENT)
Dept: GASTROENTEROLOGY | Facility: CLINIC | Age: 74
End: 2024-02-29
Payer: MEDICARE

## 2024-02-29 PROCEDURE — 43239 EGD BIOPSY SINGLE/MULTIPLE: CPT | Performed by: INTERNAL MEDICINE

## 2024-02-29 PROCEDURE — 88305 TISSUE EXAM BY PATHOLOGIST: CPT | Performed by: INTERNAL MEDICINE

## 2024-02-29 RX ORDER — DICYCLOMINE HYDROCHLORIDE 10 MG/1
10 CAPSULE ORAL 3 TIMES DAILY
Qty: 90 CAPSULE | Refills: 2 | Status: SHIPPED | OUTPATIENT
Start: 2024-02-29 | End: 2024-05-29

## 2024-02-29 RX ORDER — PANTOPRAZOLE SODIUM 40 MG/1
40 TABLET, DELAYED RELEASE ORAL DAILY
Qty: 90 TABLET | Refills: 3 | Status: SHIPPED | OUTPATIENT
Start: 2024-02-29

## 2024-03-01 ENCOUNTER — LAB REQUISITION (OUTPATIENT)
Dept: LAB | Facility: HOSPITAL | Age: 74
End: 2024-03-01
Payer: MEDICARE

## 2024-03-01 ENCOUNTER — PATIENT MESSAGE (OUTPATIENT)
Dept: INTERNAL MEDICINE | Facility: CLINIC | Age: 74
End: 2024-03-01
Payer: MEDICARE

## 2024-03-01 DIAGNOSIS — R19.7 DIARRHEA, UNSPECIFIED: ICD-10-CM

## 2024-03-01 DIAGNOSIS — K44.9 DIAPHRAGMATIC HERNIA WITHOUT OBSTRUCTION OR GANGRENE: ICD-10-CM

## 2024-03-01 DIAGNOSIS — K31.89 OTHER DISEASES OF STOMACH AND DUODENUM: ICD-10-CM

## 2024-03-01 NOTE — TELEPHONE ENCOUNTER
From: Carleen Payne  To: Luna Isidro  Sent: 3/1/2024 1:39 PM EST  Subject: Pantoprazole    Just had the tube down the throat yesterday, Dr. Crockett has put in a prescription for Pantoprazole, I just threw a bottle of that away and it’s too soon for the pharmacist to give me more. I’m going to assume that it not important enough to make waves about. If it is important, please let me know what to do.

## 2024-03-04 LAB — REF LAB TEST METHOD: NORMAL

## 2024-03-04 RX ORDER — SODIUM PICOSULFATE, MAGNESIUM OXIDE, AND ANHYDROUS CITRIC ACID 10; 3.5; 12 MG/160ML; G/160ML; G/160ML
350 LIQUID ORAL TAKE AS DIRECTED
Qty: 350 ML | Refills: 0 | Status: SHIPPED | OUTPATIENT
Start: 2024-03-04

## 2024-03-19 ENCOUNTER — HOSPITAL ENCOUNTER (OUTPATIENT)
Dept: MAMMOGRAPHY | Facility: HOSPITAL | Age: 74
Discharge: HOME | End: 2024-03-19
Admitting: NURSE PRACTITIONER
Payer: MEDICARE

## 2024-03-19 DIAGNOSIS — Z12.31 ENCOUNTER FOR SCREENING MAMMOGRAM FOR BREAST CANCER: ICD-10-CM

## 2024-03-19 PROCEDURE — 77067 SCR MAMMO BI INCL CAD: CPT

## 2024-03-19 PROCEDURE — 77063 BREAST TOMOSYNTHESIS BI: CPT

## 2024-03-25 DIAGNOSIS — J30.9 ALLERGIC RHINITIS, UNSPECIFIED SEASONALITY, UNSPECIFIED TRIGGER: ICD-10-CM

## 2024-03-25 RX ORDER — LORATADINE 10 MG/1
10 TABLET ORAL DAILY
Qty: 90 TABLET | Refills: 3 | Status: SHIPPED | OUTPATIENT
Start: 2024-03-25

## 2024-05-19 DIAGNOSIS — E78.2 MIXED HYPERLIPIDEMIA: ICD-10-CM

## 2024-05-20 RX ORDER — ATORVASTATIN CALCIUM 10 MG/1
10 TABLET, FILM COATED ORAL DAILY
Qty: 90 TABLET | Refills: 1 | Status: SHIPPED | OUTPATIENT
Start: 2024-05-20

## 2024-07-22 ENCOUNTER — OFFICE VISIT (OUTPATIENT)
Dept: INTERNAL MEDICINE | Facility: CLINIC | Age: 74
End: 2024-07-22
Payer: MEDICARE

## 2024-07-22 VITALS
HEART RATE: 72 BPM | SYSTOLIC BLOOD PRESSURE: 122 MMHG | RESPIRATION RATE: 18 BRPM | BODY MASS INDEX: 22.47 KG/M2 | HEIGHT: 61 IN | WEIGHT: 119 LBS | DIASTOLIC BLOOD PRESSURE: 70 MMHG | TEMPERATURE: 97.8 F

## 2024-07-22 DIAGNOSIS — L57.0 ACTINIC KERATOSIS: Primary | ICD-10-CM

## 2024-07-22 DIAGNOSIS — R21 RASH: ICD-10-CM

## 2024-07-22 PROCEDURE — 3074F SYST BP LT 130 MM HG: CPT | Performed by: NURSE PRACTITIONER

## 2024-07-22 PROCEDURE — 3078F DIAST BP <80 MM HG: CPT | Performed by: NURSE PRACTITIONER

## 2024-07-22 PROCEDURE — 1126F AMNT PAIN NOTED NONE PRSNT: CPT | Performed by: NURSE PRACTITIONER

## 2024-07-22 PROCEDURE — 99214 OFFICE O/P EST MOD 30 MIN: CPT | Performed by: NURSE PRACTITIONER

## 2024-07-22 RX ORDER — METHYLPREDNISOLONE 4 MG/1
TABLET ORAL
Qty: 21 TABLET | Refills: 0 | Status: SHIPPED | OUTPATIENT
Start: 2024-07-22

## 2024-07-22 RX ORDER — PERMETHRIN 50 MG/G
1 CREAM TOPICAL ONCE
Qty: 60 G | Refills: 1 | Status: SHIPPED | OUTPATIENT
Start: 2024-07-22 | End: 2024-07-22

## 2024-07-22 NOTE — PROGRESS NOTES
"Chief Complaint  Rash (Itchy rash face , arms, legs x3 weeks. )    Subjective          Carleen Payne presents to [facility]  History of Present Illness  History of Present Illness  Several AK on face and notice areas on R face the patient has a new area on her face on the right cheek she feels may be an AK.  She has a dermatology appointment upcoming.      Patient also has itching rash on her arms bilaterally.  She has had no major treatment.  No similar people with similar rashes.  The patient denies URI symptoms.  She has had a history of scabies mite twice.              Objective   Vital Signs:   /70 (BP Location: Left arm, Patient Position: Sitting, Cuff Size: Adult)   Pulse 72   Temp 97.8 °F (36.6 °C) (Infrared)   Resp 18   Ht 154.9 cm (60.98\")   Wt 54 kg (119 lb)   BMI 22.50 kg/m²     Physical Exam  Vitals and nursing note reviewed.   Constitutional:       General: She is not in acute distress.     Appearance: Normal appearance. She is well-developed. She is not ill-appearing.   HENT:      Head: Normocephalic and atraumatic.   Eyes:      General: No scleral icterus.  Neck:      Thyroid: No thyromegaly.   Cardiovascular:      Rate and Rhythm: Normal rate and regular rhythm.   Pulmonary:      Effort: Pulmonary effort is normal.   Abdominal:      General: Bowel sounds are normal.      Palpations: Abdomen is soft.   Lymphadenopathy:      Cervical: No cervical adenopathy.   Skin:     Capillary Refill: Capillary refill takes 2 to 3 seconds.      Coloration: Skin is not pale.      Comments:   1/2 cm oblong erythematous right cheek  Erythematous linear 1 mm papular areas right wrist more coalesced but bilateral flexor regions   Neurological:      Mental Status: She is alert and oriented to person, place, and time.   Psychiatric:         Mood and Affect: Mood normal.         Behavior: Behavior normal.        Result Review :                 Assessment and " Plan    Diagnoses and all orders for this visit:    1. Actinic keratosis (Primary)  -     Ambulatory Referral to Dermatology    2. Rash    Other orders  -     methylPREDNISolone (MEDROL) 4 MG dose pack; Take as directed on package instructions.  Dispense: 21 tablet; Refill: 0  -     permethrin (ELIMITE) 5 % cream; Apply 1 Application topically to the appropriate area as directed 1 (One) Time for 1 dose.  Dispense: 60 g; Refill: 1      Assessment & Plan    Instructions for use of permethrin.  If symptoms not improve or worsening she will discuss with dermatology at the time of her visit for her actinic keratosis.      BMI is within normal parameters. No other follow-up for BMI required.        Follow Up   Return if symptoms worsen or fail to improve.  Patient was given instructions and counseling regarding her condition or for health maintenance advice. Please see specific information pulled into the AVS if appropriate.     RTC/call  If symptoms worsen  Meds MOA and SE's reviewed and pt v/u    07/22/24   16:26 EDT

## 2024-08-28 ENCOUNTER — OFFICE VISIT (OUTPATIENT)
Dept: GASTROENTEROLOGY | Facility: CLINIC | Age: 74
End: 2024-08-28
Payer: MEDICARE

## 2024-08-28 VITALS
TEMPERATURE: 97.8 F | WEIGHT: 115.2 LBS | DIASTOLIC BLOOD PRESSURE: 72 MMHG | HEIGHT: 61 IN | BODY MASS INDEX: 21.75 KG/M2 | SYSTOLIC BLOOD PRESSURE: 100 MMHG | OXYGEN SATURATION: 96 % | HEART RATE: 70 BPM

## 2024-08-28 DIAGNOSIS — K52.9 CHRONIC DIARRHEA: Primary | ICD-10-CM

## 2024-08-28 DIAGNOSIS — K50.118 COLITIS, REGIONAL, OTHER COMPLICATION: ICD-10-CM

## 2024-08-28 PROCEDURE — 99214 OFFICE O/P EST MOD 30 MIN: CPT | Performed by: INTERNAL MEDICINE

## 2024-08-28 PROCEDURE — 3074F SYST BP LT 130 MM HG: CPT | Performed by: INTERNAL MEDICINE

## 2024-08-28 PROCEDURE — G2211 COMPLEX E/M VISIT ADD ON: HCPCS | Performed by: INTERNAL MEDICINE

## 2024-08-28 PROCEDURE — 3078F DIAST BP <80 MM HG: CPT | Performed by: INTERNAL MEDICINE

## 2024-08-28 PROCEDURE — 1159F MED LIST DOCD IN RCRD: CPT | Performed by: INTERNAL MEDICINE

## 2024-08-28 PROCEDURE — 1160F RVW MEDS BY RX/DR IN RCRD: CPT | Performed by: INTERNAL MEDICINE

## 2024-08-28 RX ORDER — HYOSCYAMINE SULFATE 0.125 MG
0.12 TABLET ORAL
Qty: 120 TABLET | Refills: 5 | Status: SHIPPED | OUTPATIENT
Start: 2024-08-28

## 2024-08-28 NOTE — PROGRESS NOTES
PCP: Luna Isidro APRN    Chief Complaint   Patient presents with    Follow-up    Abdominal Pain    Diarrhea       History of Present Illness:   Carleen Payne is a 74 y.o. female who presents to GI clinic as a follow up for IBS-C.  She actually has stopped miralax and has been having diarrhea since giardia diagnosis. Having 2-5 bms including night time awakening. Egd with duodenal biopsies negative for atrophy.    Past Medical History:   Diagnosis Date    Chronic pain disorder 03/2000    Hyperlipidemia     Hypertension     Joint pain 12 years    Leg pain 09/01/2016    Low back pain 2005    Epidural treatments as needed    Lumbar stenosis with neurogenic claudication 06/16/2019    Lumbosacral disc disease 16 years    Osteoporosis     Shingles        Past Surgical History:   Procedure Laterality Date    EPIDURAL BLOCK  5/12/2021    NO PAST SURGERIES           Current Outpatient Medications:     atorvastatin (LIPITOR) 10 MG tablet, Take 1 tablet by mouth Daily., Disp: 90 tablet, Rfl: 1    Calcium 150 MG tablet, Take 150 mg by mouth Daily., Disp: , Rfl:     Cholecalciferol (VITAMIN D) 1000 UNITS tablet, Take 1 tablet by mouth Daily., Disp: , Rfl:     EPINEPHrine (EPIPEN) 0.3 MG/0.3ML solution auto-injector injection, INJECT INTRAMUSCULARLY AS NEEDED FOR ALLERGIC REACTION, Disp: , Rfl:     loratadine (CLARITIN) 10 MG tablet, TAKE 1 TABLET DAILY, Disp: 90 tablet, Rfl: 3    losartan (COZAAR) 50 MG tablet, Take 1 tablet by mouth Daily., Disp: 90 tablet, Rfl: 1    metoprolol succinate XL (TOPROL-XL) 50 MG 24 hr tablet, Take 1 tablet by mouth Daily., Disp: 90 tablet, Rfl: 1    pantoprazole (PROTONIX) 40 MG EC tablet, Take 1 tablet by mouth Daily., Disp: 90 tablet, Rfl: 3    Sod Picosulfate-Mag Ox-Cit Acd (Clenpiq) 10-3.5-12 MG-GM -GM/160ML solution, Take 350 mL by mouth Take As Directed., Disp: 350 mL, Rfl: 0    methylPREDNISolone (MEDROL) 4 MG dose pack, Take as directed on package instructions., Disp: 21 tablet,  Rfl: 0    Allergies   Allergen Reactions    Erythromycin Base Nausea And Vomiting    Gabapentin Swelling     Body swelling    Isothiazolinone Chloride Rash       Family History   Problem Relation Age of Onset    Hypertension Mother     Hypertension Father     Stroke Father     Heart attack Brother     Thyroid disease Daughter     Stroke Maternal Grandmother     Cancer Maternal Grandfather     Cancer Paternal Grandmother     Ovarian cancer Paternal Grandmother         60'S    Arthritis Brother     Ovarian cancer Paternal Aunt         60'S    Ovarian cancer Cousin         PATERNAL 1ST COUSIN, 40'S    Ovarian cancer Cousin         PATERNAL 1ST COUSIN, 60'S    Breast cancer Neg Hx        Social History     Socioeconomic History    Marital status:    Tobacco Use    Smoking status: Never     Passive exposure: Never    Smokeless tobacco: Never   Vaping Use    Vaping status: Never Used   Substance and Sexual Activity    Alcohol use: Never    Drug use: Never    Sexual activity: Yes     Partners: Male       Review of Systems  A complete 12 point ros was asked and is negative except for that mentioned above.  In particular:  No fever  No rash  No increased arthralgias  No worsening edema  No cough  No dyspnea  No chest pain      Vitals:    08/28/24 1437   BP: 100/72   Pulse: 70   Temp: 97.8 °F (36.6 °C)   SpO2: 96%       Physical Exam  General: well developed, well nourished  A+O x 3 NAD  HEENT: NCAT, pupils equal appearing, sclera appear white  NECK: full ROM  Respiratory: symmetric chest rise, normal effort, normal work of breathing, no overt rales  Abdomen: non-distended  Skin: normal color, no jaundice  Neuro: no tremor, no facial droop  Psych: normal mood and affect      Assessment/Plan  Workup: colonoscopy, 12/2023: tortuous colon, diverticulosis left sided, 3 mm polyp: benign, repeat 2028  Last seen for LUQ pain 2019, luq discomfort, 1 bm q 2 weeks on average which is not bothersome to her, recommended miralax at  that time.  Egd: 2/2024    1.) Chronic luq pain  Workup: egd  Bentyl worsened pain  - suspect post infectious IBS  - rx prn levsin      2.) h/o giardia  Completed antibiotic therapy  Egd without duodenal atrophy  Repeat giardia tests    3.) h/o constipation  Has used miralax in the past, hold for now    4.) HCM  Repeat colonoscopy 2028    5.) Chronic diarrhea  Hold miralax,  Repeat stool studies  Future consideration  - in inflammatory, consider budesonide  - if normal, consider antibiotics/probiotics followed by colonoscopy if no improvement    Stephen Crockett MD  8/28/2024

## 2024-08-29 ENCOUNTER — LAB (OUTPATIENT)
Dept: LAB | Facility: HOSPITAL | Age: 74
End: 2024-08-29
Payer: MEDICARE

## 2024-08-29 DIAGNOSIS — K50.118 COLITIS, REGIONAL, OTHER COMPLICATION: ICD-10-CM

## 2024-08-29 DIAGNOSIS — K52.9 CHRONIC DIARRHEA: ICD-10-CM

## 2024-08-29 LAB
ADV 40+41 DNA STL QL NAA+NON-PROBE: NOT DETECTED
ASTRO TYP 1-8 RNA STL QL NAA+NON-PROBE: NOT DETECTED
C CAYETANENSIS DNA STL QL NAA+NON-PROBE: NOT DETECTED
C COLI+JEJ+UPSA DNA STL QL NAA+NON-PROBE: NOT DETECTED
C DIFF TOX GENS STL QL NAA+PROBE: NOT DETECTED
CRYPTOSP DNA STL QL NAA+NON-PROBE: NOT DETECTED
E HISTOLYT DNA STL QL NAA+NON-PROBE: NOT DETECTED
EAEC PAA PLAS AGGR+AATA ST NAA+NON-PRB: NOT DETECTED
EC STX1+STX2 GENES STL QL NAA+NON-PROBE: NOT DETECTED
EPEC EAE GENE STL QL NAA+NON-PROBE: NOT DETECTED
ETEC LTA+ST1A+ST1B TOX ST NAA+NON-PROBE: NOT DETECTED
G LAMBLIA DNA STL QL NAA+NON-PROBE: NOT DETECTED
NOROVIRUS GI+II RNA STL QL NAA+NON-PROBE: NOT DETECTED
P SHIGELLOIDES DNA STL QL NAA+NON-PROBE: NOT DETECTED
RVA RNA STL QL NAA+NON-PROBE: NOT DETECTED
S ENT+BONG DNA STL QL NAA+NON-PROBE: NOT DETECTED
SAPO I+II+IV+V RNA STL QL NAA+NON-PROBE: NOT DETECTED
SHIGELLA SP+EIEC IPAH ST NAA+NON-PROBE: NOT DETECTED
V CHOL+PARA+VUL DNA STL QL NAA+NON-PROBE: NOT DETECTED
V CHOLERAE DNA STL QL NAA+NON-PROBE: NOT DETECTED
Y ENTEROCOL DNA STL QL NAA+NON-PROBE: NOT DETECTED

## 2024-08-29 PROCEDURE — 83993 ASSAY FOR CALPROTECTIN FECAL: CPT

## 2024-08-29 PROCEDURE — 87493 C DIFF AMPLIFIED PROBE: CPT

## 2024-08-29 PROCEDURE — 87507 IADNA-DNA/RNA PROBE TQ 12-25: CPT

## 2024-09-01 LAB — CALPROTECTIN STL-MCNT: 19 UG/G (ref 0–120)

## 2024-09-18 ENCOUNTER — OFFICE VISIT (OUTPATIENT)
Dept: PAIN MEDICINE | Facility: CLINIC | Age: 74
End: 2024-09-18
Payer: MEDICARE

## 2024-09-18 VITALS — BODY MASS INDEX: 22.09 KG/M2 | WEIGHT: 117 LBS | HEIGHT: 61 IN

## 2024-09-18 DIAGNOSIS — M54.16 LUMBAR RADICULOPATHY: ICD-10-CM

## 2024-09-18 DIAGNOSIS — M48.062 LUMBAR STENOSIS WITH NEUROGENIC CLAUDICATION: ICD-10-CM

## 2024-09-18 DIAGNOSIS — M48.062 LUMBAR STENOSIS WITH NEUROGENIC CLAUDICATION: Primary | ICD-10-CM

## 2024-09-18 DIAGNOSIS — M51.36 DDD (DEGENERATIVE DISC DISEASE), LUMBAR: ICD-10-CM

## 2024-09-18 DIAGNOSIS — M43.16 SPONDYLOLISTHESIS OF LUMBAR REGION: ICD-10-CM

## 2024-09-18 PROCEDURE — 1159F MED LIST DOCD IN RCRD: CPT | Performed by: NURSE PRACTITIONER

## 2024-09-18 PROCEDURE — 99213 OFFICE O/P EST LOW 20 MIN: CPT | Performed by: NURSE PRACTITIONER

## 2024-09-18 PROCEDURE — 1160F RVW MEDS BY RX/DR IN RCRD: CPT | Performed by: NURSE PRACTITIONER

## 2024-09-18 PROCEDURE — 1125F AMNT PAIN NOTED PAIN PRSNT: CPT | Performed by: NURSE PRACTITIONER

## 2024-09-27 ENCOUNTER — OFFICE VISIT (OUTPATIENT)
Dept: INTERNAL MEDICINE | Facility: CLINIC | Age: 74
End: 2024-09-27
Payer: MEDICARE

## 2024-09-27 VITALS
HEART RATE: 72 BPM | HEIGHT: 61 IN | BODY MASS INDEX: 21.94 KG/M2 | TEMPERATURE: 97.3 F | RESPIRATION RATE: 14 BRPM | DIASTOLIC BLOOD PRESSURE: 86 MMHG | SYSTOLIC BLOOD PRESSURE: 134 MMHG | WEIGHT: 116.2 LBS

## 2024-09-27 DIAGNOSIS — E78.2 MIXED HYPERLIPIDEMIA: ICD-10-CM

## 2024-09-27 DIAGNOSIS — Z12.31 ENCOUNTER FOR SCREENING MAMMOGRAM FOR BREAST CANCER: ICD-10-CM

## 2024-09-27 DIAGNOSIS — Z78.0 POSTMENOPAUSE: ICD-10-CM

## 2024-09-27 DIAGNOSIS — Z00.00 ENCOUNTER FOR SUBSEQUENT ANNUAL WELLNESS VISIT (AWV) IN MEDICARE PATIENT: Primary | ICD-10-CM

## 2024-09-27 DIAGNOSIS — I10 ESSENTIAL HYPERTENSION: ICD-10-CM

## 2024-09-27 LAB
ALBUMIN SERPL-MCNC: 4.5 G/DL (ref 3.5–5.2)
ALBUMIN/GLOB SERPL: 2 G/DL
ALP SERPL-CCNC: 91 U/L (ref 39–117)
ALT SERPL W P-5'-P-CCNC: 17 U/L (ref 1–33)
ANION GAP SERPL CALCULATED.3IONS-SCNC: 10.9 MMOL/L (ref 5–15)
AST SERPL-CCNC: 27 U/L (ref 1–32)
BASOPHILS # BLD AUTO: 0.06 10*3/MM3 (ref 0–0.2)
BASOPHILS NFR BLD AUTO: 0.9 % (ref 0–1.5)
BILIRUB SERPL-MCNC: 0.4 MG/DL (ref 0–1.2)
BUN SERPL-MCNC: 15 MG/DL (ref 8–23)
BUN/CREAT SERPL: 17.9 (ref 7–25)
CALCIUM SPEC-SCNC: 9.9 MG/DL (ref 8.6–10.5)
CHLORIDE SERPL-SCNC: 104 MMOL/L (ref 98–107)
CHOLEST SERPL-MCNC: 194 MG/DL (ref 0–200)
CO2 SERPL-SCNC: 27.1 MMOL/L (ref 22–29)
CREAT SERPL-MCNC: 0.84 MG/DL (ref 0.57–1)
DEPRECATED RDW RBC AUTO: 42.9 FL (ref 37–54)
EGFRCR SERPLBLD CKD-EPI 2021: 73.9 ML/MIN/1.73
EOSINOPHIL # BLD AUTO: 0.16 10*3/MM3 (ref 0–0.4)
EOSINOPHIL NFR BLD AUTO: 2.3 % (ref 0.3–6.2)
ERYTHROCYTE [DISTWIDTH] IN BLOOD BY AUTOMATED COUNT: 12.6 % (ref 12.3–15.4)
GLOBULIN UR ELPH-MCNC: 2.3 GM/DL
GLUCOSE SERPL-MCNC: 90 MG/DL (ref 65–99)
HCT VFR BLD AUTO: 41.5 % (ref 34–46.6)
HDLC SERPL-MCNC: 73 MG/DL (ref 40–60)
HGB BLD-MCNC: 14.2 G/DL (ref 12–15.9)
IMM GRANULOCYTES # BLD AUTO: 0.02 10*3/MM3 (ref 0–0.05)
IMM GRANULOCYTES NFR BLD AUTO: 0.3 % (ref 0–0.5)
LDLC SERPL CALC-MCNC: 98 MG/DL (ref 0–100)
LDLC/HDLC SERPL: 1.3 {RATIO}
LYMPHOCYTES # BLD AUTO: 1.51 10*3/MM3 (ref 0.7–3.1)
LYMPHOCYTES NFR BLD AUTO: 21.7 % (ref 19.6–45.3)
MCH RBC QN AUTO: 32.1 PG (ref 26.6–33)
MCHC RBC AUTO-ENTMCNC: 34.2 G/DL (ref 31.5–35.7)
MCV RBC AUTO: 93.7 FL (ref 79–97)
MONOCYTES # BLD AUTO: 0.69 10*3/MM3 (ref 0.1–0.9)
MONOCYTES NFR BLD AUTO: 9.9 % (ref 5–12)
NEUTROPHILS NFR BLD AUTO: 4.52 10*3/MM3 (ref 1.7–7)
NEUTROPHILS NFR BLD AUTO: 64.9 % (ref 42.7–76)
NRBC BLD AUTO-RTO: 0 /100 WBC (ref 0–0.2)
PLATELET # BLD AUTO: 254 10*3/MM3 (ref 140–450)
PMV BLD AUTO: 10.4 FL (ref 6–12)
POTASSIUM SERPL-SCNC: 4.4 MMOL/L (ref 3.5–5.2)
PROT SERPL-MCNC: 6.8 G/DL (ref 6–8.5)
RBC # BLD AUTO: 4.43 10*6/MM3 (ref 3.77–5.28)
SODIUM SERPL-SCNC: 142 MMOL/L (ref 136–145)
TRIGL SERPL-MCNC: 132 MG/DL (ref 0–150)
TSH SERPL DL<=0.05 MIU/L-ACNC: 0.69 UIU/ML (ref 0.27–4.2)
VLDLC SERPL-MCNC: 23 MG/DL (ref 5–40)
WBC NRBC COR # BLD AUTO: 6.96 10*3/MM3 (ref 3.4–10.8)

## 2024-09-27 PROCEDURE — 85025 COMPLETE CBC W/AUTO DIFF WBC: CPT | Performed by: NURSE PRACTITIONER

## 2024-09-27 PROCEDURE — 1160F RVW MEDS BY RX/DR IN RCRD: CPT | Performed by: NURSE PRACTITIONER

## 2024-09-27 PROCEDURE — 80061 LIPID PANEL: CPT | Performed by: NURSE PRACTITIONER

## 2024-09-27 PROCEDURE — 3075F SYST BP GE 130 - 139MM HG: CPT | Performed by: NURSE PRACTITIONER

## 2024-09-27 PROCEDURE — 1159F MED LIST DOCD IN RCRD: CPT | Performed by: NURSE PRACTITIONER

## 2024-09-27 PROCEDURE — G0439 PPPS, SUBSEQ VISIT: HCPCS | Performed by: NURSE PRACTITIONER

## 2024-09-27 PROCEDURE — 1125F AMNT PAIN NOTED PAIN PRSNT: CPT | Performed by: NURSE PRACTITIONER

## 2024-09-27 PROCEDURE — 84443 ASSAY THYROID STIM HORMONE: CPT | Performed by: NURSE PRACTITIONER

## 2024-09-27 PROCEDURE — 1170F FXNL STATUS ASSESSED: CPT | Performed by: NURSE PRACTITIONER

## 2024-09-27 PROCEDURE — 3079F DIAST BP 80-89 MM HG: CPT | Performed by: NURSE PRACTITIONER

## 2024-09-27 PROCEDURE — 80053 COMPREHEN METABOLIC PANEL: CPT | Performed by: NURSE PRACTITIONER

## 2024-09-30 ENCOUNTER — OUTSIDE FACILITY SERVICE (OUTPATIENT)
Dept: PAIN MEDICINE | Facility: CLINIC | Age: 74
End: 2024-09-30
Payer: MEDICARE

## 2024-09-30 ENCOUNTER — DOCUMENTATION (OUTPATIENT)
Dept: PAIN MEDICINE | Facility: CLINIC | Age: 74
End: 2024-09-30

## 2024-09-30 PROCEDURE — 64483 NJX AA&/STRD TFRM EPI L/S 1: CPT | Performed by: ANESTHESIOLOGY

## 2024-09-30 PROCEDURE — 64484 NJX AA&/STRD TFRM EPI L/S EA: CPT | Performed by: ANESTHESIOLOGY

## 2024-09-30 NOTE — PROGRESS NOTES
[facility]    PROCEDURE DATE: 09/30/2024    PREOPERATIVE DIAGNOSES:  1. Lumbar stenosis with neurogenic claudication   2. Lumbar radiculopathy   3. Spondylolisthesis of lumbar region   4. DDD (degenerative disc disease), lumbar   5. History of compression fracture of vertebral column   6. Osteoporosis     POSTOPERATIVE DIAGNOSES:  1. Lumbar stenosis with neurogenic claudication   2. Lumbar radiculopathy   3. Spondylolisthesis of lumbar region   4. DDD (degenerative disc disease), lumbar   5. History of compression fracture of vertebral column   6. Osteoporosis    PROCEDURE:   Therapeutic right L4-L5 transforaminal epidural steroid injection  Therapeutic right L5-S1 transforaminal epidural steroid injection    ANESTHESIA: Local anesthesia ONLY    COMPLICATIONS: None    EBL: 0    PROCEDURE SUMMARY: After explaining all the risks and benefits of the procedure, an informed consent was obtained.  The patient's surgical site was confirmed with the patient and marked in the holding room accordingly. The patient was transferred to the procedure room and placed on the operating room table in the prone position. Time out was completed. Noninvasive monitors were applied. The patient declined administration of IV sedation by a designated procedure room nurse, who monitored the patient's level of consciousness and physiological status. Pertinent information was reported on a sedation flow sheet, which is part of the patient's permanent medical records. Start  procedure time: 07:03 AM. The patient's vital signs remained within normal limits. The lumbar spine area was prepped and draped in a sterile fashion.  Using C-arm fluoroscopic guidance, the six o'clock area of the right L4 and right L5 pedicles (targets) were identified.  The skin and tissues overlying the targets were anesthetized with five ml of 1% lidocaine followed by eight ml of 0.25% bupivacaine. Then, 22-gauge styletted needles were advanced into the most posterior and  superior portion of the right L4-L5 and right L5-S1 neural foramina without any difficulties. The patient did not experience paresthesia. AP and lateral X-ray views were obtained confirming appropriate needle placement. Aspiration was negative for blood and/or CSF. One ml of Isovue-200 was injected per lumbar level, and contrast dye was seen bathing the right L4 and right L5 nerve roots with spread into the epidural space. AP and lateral X-rays were obtained and scanned in the patient's chart. Two ml of 0.25% bupivacaine with 4 mg of dexamethasone were injected per lumbar level. Fluoroscopy was utilized using low-dose of radiation applying collimation, pulsed mode, and shielding following ALARA recommendations.  Fluoroscopy time: 10 seconds. End  procedure time: 07:07 AM. The patient tolerated the procedure well and without incident.  Upon completion of the procedure, the patient was transferred to the recovery room in stable condition. The patient was discharged from the [facility] neurologically intact and with appropriate discharge instructions. Pain level before procedure: 6/10. Pain level after procedure: 0/10.    PLAN:    Follow-up with RAYMOND Vance in 12 weeks. We may repeat therapeutic right L4-L5 and right L5-S1 transforaminal epidural steroid injections depending on patient's outcome or will facilitate NS follow-up with Dr Gomes.  Pharmacological measures: See OV note.   Long-term rehabilitation efforts: See OV note.  The patient has been instructed to contact my office with any questions or difficulties. The patient understands the plan and agrees to proceed accordingly.       Signatures  Electronically signed by: Herve Philippe M.D.; SEPTEMBER 30, 2024, 07:22 AM EST (Author)

## 2024-10-09 DIAGNOSIS — E78.2 MIXED HYPERLIPIDEMIA: ICD-10-CM

## 2024-10-11 RX ORDER — ATORVASTATIN CALCIUM 10 MG/1
10 TABLET, FILM COATED ORAL DAILY
Qty: 90 TABLET | Refills: 3 | Status: SHIPPED | OUTPATIENT
Start: 2024-10-11

## 2024-10-14 ENCOUNTER — TELEPHONE (OUTPATIENT)
Dept: PAIN MEDICINE | Facility: CLINIC | Age: 74
End: 2024-10-14
Payer: MEDICARE

## 2024-10-14 NOTE — TELEPHONE ENCOUNTER
FOLLOW UP CALL AFTER PROCEDURE    I spoke with the patient regarding how he/she is feeling after her procedure with Dr Philippe on 09/30/24. Patient reports that she is doing well.    Carleen Payne underwent a RT L4-L5 + RT L5-SI TFESI on 09/30/24. Patient reported 95% relief at the time of after procedure exam with Dr Philippe. In addition, patient experienced significant functional improvement (perforing activities without experiencing pain compared with examoination prior to procedure that produced these activities.)    Pain level before procedure: 6/10  Pain level after procedure: 1/10    Today, the patient reports 95% pain relief (pain level 1/10.)    Patient denies side effects or complications.  Patient does not have any questions or concerns at this time.    Advised patient of follow up with RAYMOND Vance on 12/9/24.

## 2024-10-29 ENCOUNTER — TELEPHONE (OUTPATIENT)
Dept: PAIN MEDICINE | Facility: CLINIC | Age: 74
End: 2024-10-29
Payer: MEDICARE

## 2024-10-29 NOTE — TELEPHONE ENCOUNTER
Celled patient to advise her that her appointment that was originally scheduled for 12/9/24 has to be rescheduled to 1/14/25 with RAYMOND Vance. Patient was understanding and had no further questions.     Closing TE

## 2024-12-04 ENCOUNTER — HOSPITAL ENCOUNTER (EMERGENCY)
Facility: HOSPITAL | Age: 74
Discharge: HOME | End: 2024-12-04
Attending: EMERGENCY MEDICINE | Admitting: EMERGENCY MEDICINE
Payer: MEDICARE

## 2024-12-04 VITALS
TEMPERATURE: 98.4 F | BODY MASS INDEX: 21.14 KG/M2 | HEIGHT: 61 IN | OXYGEN SATURATION: 94 % | RESPIRATION RATE: 18 BRPM | WEIGHT: 112 LBS | DIASTOLIC BLOOD PRESSURE: 91 MMHG | SYSTOLIC BLOOD PRESSURE: 140 MMHG | HEART RATE: 104 BPM

## 2024-12-04 DIAGNOSIS — Z20.822 CLOSE EXPOSURE TO COVID-19 VIRUS: ICD-10-CM

## 2024-12-04 DIAGNOSIS — J06.9 VIRAL URI: Primary | ICD-10-CM

## 2024-12-04 LAB
FLUAV RNA RESP QL NAA+PROBE: NOT DETECTED
FLUBV RNA RESP QL NAA+PROBE: NOT DETECTED
RSV RNA RESP QL NAA+PROBE: NOT DETECTED
S PYO AG THROAT QL: NEGATIVE
SARS-COV-2 RNA RESP QL NAA+PROBE: NOT DETECTED

## 2024-12-04 PROCEDURE — 87880 STREP A ASSAY W/OPTIC: CPT | Performed by: NURSE PRACTITIONER

## 2024-12-04 PROCEDURE — 87637 SARSCOV2&INF A&B&RSV AMP PRB: CPT | Performed by: NURSE PRACTITIONER

## 2024-12-04 PROCEDURE — 99283 EMERGENCY DEPT VISIT LOW MDM: CPT

## 2024-12-04 PROCEDURE — 63710000001 DEXAMETHASONE PER 0.25 MG: Performed by: NURSE PRACTITIONER

## 2024-12-04 PROCEDURE — 87081 CULTURE SCREEN ONLY: CPT | Performed by: NURSE PRACTITIONER

## 2024-12-04 RX ORDER — ACETAMINOPHEN 500 MG
1000 TABLET ORAL ONCE
Status: COMPLETED | OUTPATIENT
Start: 2024-12-04 | End: 2024-12-04

## 2024-12-04 RX ORDER — DEXAMETHASONE 4 MG/1
10 TABLET ORAL ONCE
Status: COMPLETED | OUTPATIENT
Start: 2024-12-04 | End: 2024-12-04

## 2024-12-04 RX ADMIN — DEXAMETHASONE 10 MG: 4 TABLET ORAL at 16:38

## 2024-12-04 RX ADMIN — ACETAMINOPHEN 1000 MG: 500 TABLET ORAL at 16:38

## 2024-12-04 NOTE — ED PROVIDER NOTES
EMERGENCY DEPARTMENT ENCOUNTER    Pt Name: Carleen Payne  MRN: [id number]  Pt :   1950  Room Number:    Date of encounter:  2024  PCP: Luna Isidro APRN  ED Provider: RAYMOND Peña    Historian: Patient      HPI:  Chief Complaint: Cough, congestion, body aches        Context: Carleen Payne is a 74 y.o. female who presents to the ED c/o cough, sore throat, fever, malaise, body aches since yesterday.  Reports mild nonproductive cough, scratchy throat, and excessive sleepiness.  Denies chest pain, shortness of breath, leg swelling.  History of hypertension and dyslipidemia.  Patient is very active, walks daily.  Denies history of smoking.      PAST MEDICAL HISTORY  Past Medical History:   Diagnosis Date    Allergic 1975    Arthritis 2005    Chronic pain disorder 2000    Hyperlipidemia     Hypertension     Joint pain 12 years    Leg pain 2016    Low back pain 2005    Epidural treatments as needed    Lumbar stenosis with neurogenic claudication 2019    Lumbosacral disc disease 16 years    Osteopenia     Osteoporosis     Shingles          PAST SURGICAL HISTORY  Past Surgical History:   Procedure Laterality Date    COLONOSCOPY  2024    EPIDURAL BLOCK  2021    NO PAST SURGERIES           FAMILY HISTORY  Family History   Problem Relation Age of Onset    Hypertension Mother     Hypertension Father     Stroke Father     Heart attack Brother     Thyroid disease Daughter     Stroke Maternal Grandmother     Cancer Maternal Grandfather     Cancer Paternal Grandmother     Ovarian cancer Paternal Grandmother         60'S    Arthritis Brother     Ovarian cancer Paternal Aunt         60'S    Ovarian cancer Cousin         PATERNAL 1ST COUSIN, 40'S    Ovarian cancer Cousin         PATERNAL 1ST COUSIN, 60'S    Breast cancer Neg Hx          SOCIAL HISTORY  Social History     Socioeconomic History    Marital status:    Tobacco Use    Smoking status: Never      Passive exposure: Never    Smokeless tobacco: Never   Vaping Use    Vaping status: Never Used   Substance and Sexual Activity    Alcohol use: Never    Drug use: Never    Sexual activity: Yes     Partners: Male     Birth control/protection: Post-menopausal         ALLERGIES  Erythromycin base, Gabapentin, and Isothiazolinone chloride        REVIEW OF SYSTEMS  Review of Systems       All systems reviewed and negative except for those discussed in HPI.       PHYSICAL EXAM    I have reviewed the triage vital signs and nursing notes.    ED Triage Vitals   Temp Heart Rate Resp BP SpO2   12/04/24 1428 12/04/24 1428 12/04/24 1428 12/04/24 1431 12/04/24 1428   98.4 °F (36.9 °C) 104 18 140/91 94 %      Temp src Heart Rate Source Patient Position BP Location FiO2 (%)   12/04/24 1428 12/04/24 1428 12/04/24 1428 12/04/24 1428 --   Oral Monitor Sitting Left arm        Physical Exam  GENERAL:   Appears in no acute distress.   HENT: Nares patent.  EYES: No scleral icterus.  CV: Regular rhythm, tachycardia  RESPIRATORY: Normal effort.  No audible wheezes, rales or rhonchi.  ABDOMEN: Soft, nontender  MUSCULOSKELETAL: No deformities.   NEURO: Alert, moves all extremities, follows commands.  SKIN: Warm, dry, no rash visualized.      PROCEDURES    Procedures    No orders to display       MEDICATIONS GIVEN IN ER    Medications   acetaminophen (TYLENOL) tablet 1,000 mg (1,000 mg Oral Given 12/4/24 1638)   dexAMETHasone (DECADRON) tablet 10 mg (10 mg Oral Given 12/4/24 1638)         MEDICAL DECISION MAKING, PROGRESS, and CONSULTS    All labs, if obtained, have been independently reviewed by me.  All radiology studies, if obtained, have been reviewed by me and the radiologist dictating the report.  All EKG's, if obtained, have been independently viewed and interpreted by me/my attending physician.      Discussion below represents my analysis of pertinent findings related to patient's condition, differential diagnosis, treatment plan and  final disposition.  Patient is 74-year-old female who presents for evaluation of cough, sore throat, fever and malaise since yesterday.  On physical exam she was nontoxic-appearing with stable vital signs, she was mildly tachycardic in triage at 104.  No fever.  Lungs were clear to auscultation.  Mild posterior pharynx erythema without uvular deviation or edema.  Patient tested negative for COVID, flu RSV and strep.  Her  is a patient in our ER as well, tested positive for COVID.  Will treat patient with Paxlovid given her symptomology and exposure.  CMP from 9/27/2024 was reviewed, normal creatinine 0.84.  Medication list was reviewed with on-duty pharmacist David, no contradictions asides from atorvastatin.  Patient will hold  atorvastatin during Paxlovid course.  Discussed strict return precautions for worsening of the symptoms.  Patient voiced understanding                       Differential diagnosis:    Viral URI, pneumonia, bronchitis, COVID      Additional sources:    - Discussed/ obtained information from independent historians:      - External (non-ED) record review: 9/27/2024, annual wellness exam    - Chronic or social conditions impacting care: Advanced age    - Shared decision making: Patient      Orders placed during this visit:  Orders Placed This Encounter   Procedures    COVID-19, FLU A/B, RSV PCR 1 HR TAT - Swab, Nasopharynx    Rapid Strep A Screen - Swab, Throat    Beta Strep Culture, Throat - Swab, Throat         Additional orders considered but not ordered:  Lab work    ED Course:    Consultants: Pharmacist                Shared Decision Making:  After my consideration of clinical presentation and any laboratory/radiology studies obtained, I discussed the findings with the patient/patient representative who is in agreement with the treatment plan and the final disposition.   Risks and benefits of discharge and/or observation/admission were discussed.       AS OF 20:52 EST  VITALS:    BP - 140/91  HR - 104  TEMP - 98.4 °F (36.9 °C) (Oral)  O2 SATS - 94%                  DIAGNOSIS  Final diagnoses:   Viral URI   Close exposure to COVID-19 virus         DISPOSITION  DISCHARGE    Patient discharged in stable condition.    Reviewed implications of results, diagnosis, meds, responsibility to follow up, warning signs and symptoms of possible worsening, potential complications and reasons to return to ER.    Patient/Family voiced understanding of above instructions.    Discussed plan for discharge, as there is no emergent indication for admission.  Pt/family is agreeable and understands need for follow up and possible repeat testing.  Pt/family is aware that discharge does not mean that nothing is wrong but that it indicates no emergency is currently present that requires admission and they must continue care with follow-up as given below or with a physician of their choice.     FOLLOW-UP  Luna Isidro APRN  [address]  [address]  [facility] [address] [phone]          [facility] EMERGENCY DEPARTMENT  [address] [address] [phone]             Medication List        New Prescriptions      Nirmatrelvir & Ritonavir (300mg/100mg)  Commonly known as: PAXLOVID  Take 3 tablets by mouth 2 (Two) Times a Day for 5 days.               Where to Get Your Medications        These medications were sent to [id number] - [place], [place] - [address] AT [address] [facility] [address] [facility] [phone] [facility] [phone] [facility] [address]      Phone: [phone]   Nirmatrelvir & Ritonavir (300mg/100mg)            Please note that portions of this document were completed with voice recognition software.     RAYMOND Peña   12/04/24   20:52 Chel Ratliff APRN  12/04/24 2052

## 2024-12-06 LAB — BACTERIA SPEC AEROBE CULT: NORMAL

## 2024-12-31 ENCOUNTER — OFFICE VISIT (OUTPATIENT)
Dept: INTERNAL MEDICINE | Facility: CLINIC | Age: 74
End: 2024-12-31
Payer: MEDICARE

## 2024-12-31 VITALS
HEIGHT: 62 IN | SYSTOLIC BLOOD PRESSURE: 148 MMHG | BODY MASS INDEX: 20.43 KG/M2 | OXYGEN SATURATION: 96 % | WEIGHT: 111 LBS | DIASTOLIC BLOOD PRESSURE: 88 MMHG | TEMPERATURE: 99.1 F | HEART RATE: 73 BPM

## 2024-12-31 DIAGNOSIS — I10 ESSENTIAL HYPERTENSION: ICD-10-CM

## 2024-12-31 DIAGNOSIS — R41.89 BRAIN FOG: ICD-10-CM

## 2024-12-31 DIAGNOSIS — U09.9 POST-COVID CHRONIC COUGH: ICD-10-CM

## 2024-12-31 DIAGNOSIS — G93.32 POST-COVID CHRONIC FATIGUE: Primary | ICD-10-CM

## 2024-12-31 DIAGNOSIS — U09.9 POST-COVID CHRONIC FATIGUE: Primary | ICD-10-CM

## 2024-12-31 DIAGNOSIS — R05.3 POST-COVID CHRONIC COUGH: ICD-10-CM

## 2024-12-31 PROCEDURE — 99213 OFFICE O/P EST LOW 20 MIN: CPT

## 2024-12-31 PROCEDURE — 1159F MED LIST DOCD IN RCRD: CPT

## 2024-12-31 PROCEDURE — 1126F AMNT PAIN NOTED NONE PRSNT: CPT

## 2024-12-31 PROCEDURE — 3079F DIAST BP 80-89 MM HG: CPT

## 2024-12-31 PROCEDURE — 1160F RVW MEDS BY RX/DR IN RCRD: CPT

## 2024-12-31 PROCEDURE — 3077F SYST BP >= 140 MM HG: CPT

## 2024-12-31 RX ORDER — LOSARTAN POTASSIUM 50 MG/1
50 TABLET ORAL DAILY
Qty: 90 TABLET | Refills: 1 | Status: SHIPPED | OUTPATIENT
Start: 2024-12-31

## 2024-12-31 NOTE — PROGRESS NOTES
"Chief Complaint  Cough (12/7/24), Fatigue, and Memory Loss    Subjective          Carleen Payne presents to [facility]  History of Present Illness  Patient presents to the office today with her daughter for a follow-up after COVID-19.  Patient is still dealing with a cough, fatigue,  \"brain fog\" and low energy.  She reports she is doing much better and has improved significantly, however, she has not felt her best.  She still has a cough and low energy.  She notes sometimes she will have a low-grade temperature.  Memory fog has worsened since COVID.  She denies any chest pain or shortness of breath today.  MMSE was done today with a score of 19.  She answers all questions appropriately during exam.  Objective   Vital Signs:   /88 (BP Location: Right arm, Patient Position: Sitting, Cuff Size: Adult)   Pulse 73   Temp 99.1 °F (37.3 °C) (Temporal)   Ht 157.5 cm (62.01\")   Wt 50.3 kg (111 lb)   SpO2 96%   BMI 20.30 kg/m²     Body mass index is 20.3 kg/m².    Review of Systems   Constitutional:  Positive for fatigue. Negative for chills and fever.   Respiratory:  Positive for cough. Negative for shortness of breath and wheezing.    Cardiovascular:  Negative for chest pain.   Neurological:  Positive for memory problem. Negative for headache.       Past History:  Medical History: has a past medical history of Allergic (1975), Arthritis (2005), Chronic pain disorder (03/2000), COVID (12/05/2024), Hyperlipidemia, Hypertension, Joint pain (12 years), Leg pain (09/01/2016), Low back pain (2005), Lumbar stenosis with neurogenic claudication (06/16/2019), Lumbosacral disc disease (16 years), Osteopenia (2020), Osteoporosis, and Shingles.   Surgical History: has a past surgical history that includes Epidural block injection (5/12/2021) and Colonoscopy (January 2024).   Family History: family history includes Arthritis in her brother; Cancer in her maternal " grandfather and paternal grandmother; Heart attack in her brother; Hypertension in her father and mother; Ovarian cancer in her cousin, cousin, paternal aunt, and paternal grandmother; Stroke in her father and maternal grandmother; Thyroid disease in her daughter.   Social History: reports that she has never smoked. She has never been exposed to tobacco smoke. She has never used smokeless tobacco. She reports that she does not drink alcohol and does not use drugs.      Current Outpatient Medications:     atorvastatin (LIPITOR) 10 MG tablet, TAKE 1 TABLET DAILY, Disp: 90 tablet, Rfl: 3    Calcium 150 MG tablet, Take 150 mg by mouth Daily., Disp: , Rfl:     Cholecalciferol (VITAMIN D) 1000 UNITS tablet, Take 1 tablet by mouth Daily., Disp: , Rfl:     EPINEPHrine (EPIPEN) 0.3 MG/0.3ML solution auto-injector injection, INJECT INTRAMUSCULARLY AS NEEDED FOR ALLERGIC REACTION, Disp: , Rfl:     loratadine (CLARITIN) 10 MG tablet, TAKE 1 TABLET DAILY, Disp: 90 tablet, Rfl: 3    losartan (COZAAR) 50 MG tablet, TAKE 1 TABLET BY MOUTH DAILY, Disp: 90 tablet, Rfl: 1    metoprolol succinate XL (TOPROL-XL) 50 MG 24 hr tablet, Take 1 tablet by mouth Daily., Disp: 90 tablet, Rfl: 1    Allergies: Erythromycin base, Gabapentin, Azithromycin, and Isothiazolinone chloride    Physical Exam  Vitals and nursing note reviewed.   Constitutional:       General: She is not in acute distress.     Appearance: She is not ill-appearing or toxic-appearing.   HENT:      Head: Normocephalic and atraumatic.   Cardiovascular:      Rate and Rhythm: Normal rate and regular rhythm.      Pulses: Normal pulses.      Heart sounds: Normal heart sounds. No murmur heard.  Pulmonary:      Effort: Pulmonary effort is normal. No respiratory distress.      Breath sounds: Normal breath sounds. No wheezing, rhonchi or rales.   Skin:     General: Skin is warm.   Neurological:      General: No focal deficit present.      Mental Status: She is alert and oriented to  "person, place, and time. Mental status is at baseline.   Psychiatric:         Mood and Affect: Mood normal.         Behavior: Behavior normal.         Thought Content: Thought content normal.         Judgment: Judgment normal.          Result Review :                   Assessment and Plan    Assessment & Plan  Post-COVID chronic fatigue  Discussed with patient and daughter that the symptoms of post-COVID are different for every patient.  Recommend that she can try an over-the-counter zinc supplement to see if this provides any relief in her fatigue.  Recommend plenty of rest and hydration.  Recommend if this continues to occur after a week or so, recommend to follow-up in office for further workup.       Post-COVID chronic cough  Discussed with patient and daughter that the symptoms of post-COVID are different for every patient.  Her lungs are clear and equal to auscultation on exam.  There is no wheezing or congestion noted.  We discussed avoiding sedating cough medications at this time especially where she is having some \"brain fog.\"  Recommend she utilize over-the-counter cough medication such as Robitussin.       Brain fog  Discussed with patient and daughter this is likely due to COVID-19.  She is alert and oriented today and answers questions appropriately.  MMSE score of 19. Due to her MMSE score of 19, there is concern for some underlying dementia. Recommend that this is something we continue to monitor in office.  She may need further scans or referral to neurology should this worsen or fail to improve.  We did discuss that this is likely exacerbated by COVID-19.       Essential hypertension  Blood pressure slightly elevated today 148/88.  She denies chest pain or shortness of breath.  Recommend she continue her losartan 50 mg daily as well as her metoprolol succinate XL 50 mg daily.  Recommend that she continue to monitor this closely with her PCP at future appointments.         Recommend if symptoms " worsen or fail to improve she return to clinic for further evaluation.  Patient and daughter are agreeable to this and verbalized understanding of plan of care.    Follow Up   Return if symptoms worsen or fail to improve, for Next scheduled follow up.  Patient was given instructions and counseling regarding her condition or for health maintenance advice. Please see specific information pulled into the AVS if appropriate.     Eva Brizuela, APRN

## 2024-12-31 NOTE — ASSESSMENT & PLAN NOTE
Blood pressure slightly elevated today 148/88.  She denies chest pain or shortness of breath.  Recommend she continue her losartan 50 mg daily as well as her metoprolol succinate XL 50 mg daily.  Recommend that she continue to monitor this closely with her PCP at future appointments.

## 2025-01-15 ENCOUNTER — OFFICE VISIT (OUTPATIENT)
Dept: GASTROENTEROLOGY | Facility: CLINIC | Age: 75
End: 2025-01-15
Payer: MEDICARE

## 2025-01-15 VITALS
HEART RATE: 86 BPM | DIASTOLIC BLOOD PRESSURE: 68 MMHG | BODY MASS INDEX: 20.54 KG/M2 | WEIGHT: 111.6 LBS | TEMPERATURE: 98.6 F | OXYGEN SATURATION: 99 % | SYSTOLIC BLOOD PRESSURE: 118 MMHG | HEIGHT: 62 IN

## 2025-01-15 DIAGNOSIS — K52.9 CHRONIC DIARRHEA: Primary | ICD-10-CM

## 2025-01-15 PROCEDURE — 99214 OFFICE O/P EST MOD 30 MIN: CPT | Performed by: INTERNAL MEDICINE

## 2025-01-15 PROCEDURE — G2211 COMPLEX E/M VISIT ADD ON: HCPCS | Performed by: INTERNAL MEDICINE

## 2025-01-15 PROCEDURE — 3078F DIAST BP <80 MM HG: CPT | Performed by: INTERNAL MEDICINE

## 2025-01-15 PROCEDURE — 1160F RVW MEDS BY RX/DR IN RCRD: CPT | Performed by: INTERNAL MEDICINE

## 2025-01-15 PROCEDURE — 1159F MED LIST DOCD IN RCRD: CPT | Performed by: INTERNAL MEDICINE

## 2025-01-15 PROCEDURE — 3074F SYST BP LT 130 MM HG: CPT | Performed by: INTERNAL MEDICINE

## 2025-01-15 RX ORDER — CHOLESTYRAMINE 4 G/9G
4 POWDER, FOR SUSPENSION ORAL 2 TIMES DAILY
Qty: 378 G | Refills: 11 | Status: SHIPPED | OUTPATIENT
Start: 2025-01-15

## 2025-01-15 NOTE — PROGRESS NOTES
PCP: Luna Isidro APRN    No chief complaint on file.      History of Present Illness:   Carleen Payne is a 74 y.o. female who presents to GI clinic as a follow up for chronic diarrhea/post infectious IBS. Going 3-5 x a day. No days without liquid bm. H/o constipation and giardia.    Past Medical History:   Diagnosis Date    Allergic 1975    Arthritis 2005    Chronic pain disorder 03/2000    COVID 12/05/2024    Hyperlipidemia     Hypertension     Joint pain 12 years    Leg pain 09/01/2016    Low back pain 2005    Epidural treatments as needed    Lumbar stenosis with neurogenic claudication 06/16/2019    Lumbosacral disc disease 16 years    Osteopenia 2020    Osteoporosis     Shingles        Past Surgical History:   Procedure Laterality Date    COLONOSCOPY  January 2024    EPIDURAL BLOCK  5/12/2021         Current Outpatient Medications:     atorvastatin (LIPITOR) 10 MG tablet, TAKE 1 TABLET DAILY, Disp: 90 tablet, Rfl: 3    Calcium 150 MG tablet, Take 150 mg by mouth Daily., Disp: , Rfl:     Cholecalciferol (VITAMIN D) 1000 UNITS tablet, Take 1 tablet by mouth Daily., Disp: , Rfl:     EPINEPHrine (EPIPEN) 0.3 MG/0.3ML solution auto-injector injection, INJECT INTRAMUSCULARLY AS NEEDED FOR ALLERGIC REACTION, Disp: , Rfl:     loratadine (CLARITIN) 10 MG tablet, TAKE 1 TABLET DAILY, Disp: 90 tablet, Rfl: 3    losartan (COZAAR) 50 MG tablet, TAKE 1 TABLET BY MOUTH DAILY, Disp: 90 tablet, Rfl: 1    metoprolol succinate XL (TOPROL-XL) 50 MG 24 hr tablet, Take 1 tablet by mouth Daily., Disp: 90 tablet, Rfl: 1    Allergies   Allergen Reactions    Erythromycin Base Nausea And Vomiting    Gabapentin Swelling     Body swelling    Azithromycin GI Intolerance    Isothiazolinone Chloride Rash       Family History   Problem Relation Age of Onset    Hypertension Mother     Hypertension Father     Stroke Father     Heart attack Brother     Thyroid disease Daughter     Stroke Maternal Grandmother     Cancer Maternal  Grandfather     Cancer Paternal Grandmother     Ovarian cancer Paternal Grandmother         60'S    Arthritis Brother     Ovarian cancer Paternal Aunt         60'S    Ovarian cancer Cousin         PATERNAL 1ST COUSIN, 40'S    Ovarian cancer Cousin         PATERNAL 1ST COUSIN, 60'S    Breast cancer Neg Hx        Social History     Socioeconomic History    Marital status:    Tobacco Use    Smoking status: Never     Passive exposure: Never    Smokeless tobacco: Never   Vaping Use    Vaping status: Never Used   Substance and Sexual Activity    Alcohol use: Never    Drug use: Never    Sexual activity: Yes     Partners: Male     Birth control/protection: Post-menopausal       Review of Systems  A complete 12 point ros was asked and is negative except for that mentioned above.  In particular:  No fever  No rash  No increased arthralgias  No worsening edema  No cough  No dyspnea  No chest pain      There were no vitals filed for this visit.    Physical Exam  General: well developed, well nourished  A+O x 3 NAD  HEENT: NCAT, pupils equal appearing, sclera appear white  NECK: full ROM  Respiratory: symmetric chest rise, normal effort, normal work of breathing, no overt rales  Abdomen: non-distended  Skin: normal color, no jaundice  Neuro: no tremor, no facial droop  Psych: normal mood and affect      Assessment/Plan  Workup: colonoscopy, 12/2023: tortuous colon, diverticulosis left sided, 3 mm polyp: benign, repeat 2028  Last seen for LUQ pain 2019, luq discomfort, 1 bm q 2 weeks on average which is not bothersome to her, recommended miralax at that time.  Egd: 2/2024    1.) Chronic luq pain  Workup: egd  Bentyl worsened pain  - suspect post infectious IBS  - rx prn levsin      2.) h/o giardia  No sign of recurrence    3.) h/o constipation  Has used miralax in the past, on hold due to giardia and persistent diarrhea    4.) HCM  Repeat colonoscopy 2028    5.) Chronic diarrhea, post infectious IBS-d  Hold  miralax,  Workup: fecal simón: normal, (-) c.dif, GI pcr (-) including giardia  -  will rx questran powder bid. Advised tos eparate other meds by at least 90 mins to avoid binding/malabsorption.    consider antibiotics/probiotics followed by colonoscopy if no improvement      Stephen Crockett MD  1/15/2025

## 2025-02-04 ENCOUNTER — HOSPITAL ENCOUNTER (OUTPATIENT)
Dept: BONE DENSITY | Facility: HOSPITAL | Age: 75
Discharge: HOME | End: 2025-02-04
Admitting: NURSE PRACTITIONER
Payer: MEDICARE

## 2025-02-04 DIAGNOSIS — Z78.0 POSTMENOPAUSE: ICD-10-CM

## 2025-02-04 PROCEDURE — 77080 DXA BONE DENSITY AXIAL: CPT

## 2025-02-06 ENCOUNTER — TELEPHONE (OUTPATIENT)
Dept: INTERNAL MEDICINE | Facility: CLINIC | Age: 75
End: 2025-02-06
Payer: MEDICARE

## 2025-02-06 NOTE — TELEPHONE ENCOUNTER
----- Message from Anturis Dwaine sent at 2/6/2025  8:19 AM EST -----  DEXA scan shows osteoporosis of the lumbar spine and osteopenia of the femoral necks bilaterally total hips bilaterally.  This increases risk for fracture.  I would like to discuss medications.  Please schedule an appointment to discuss medications.  Telehealth visit would be okay.

## 2025-02-20 ENCOUNTER — OFFICE VISIT (OUTPATIENT)
Dept: INTERNAL MEDICINE | Facility: CLINIC | Age: 75
End: 2025-02-20
Payer: MEDICARE

## 2025-02-20 VITALS
HEART RATE: 84 BPM | SYSTOLIC BLOOD PRESSURE: 124 MMHG | HEIGHT: 62 IN | BODY MASS INDEX: 20.13 KG/M2 | TEMPERATURE: 97.3 F | WEIGHT: 109.4 LBS | DIASTOLIC BLOOD PRESSURE: 86 MMHG | RESPIRATION RATE: 16 BRPM

## 2025-02-20 DIAGNOSIS — M81.6 LOCALIZED OSTEOPOROSIS WITHOUT CURRENT PATHOLOGICAL FRACTURE: Primary | ICD-10-CM

## 2025-02-20 PROCEDURE — 3079F DIAST BP 80-89 MM HG: CPT | Performed by: NURSE PRACTITIONER

## 2025-02-20 PROCEDURE — 99214 OFFICE O/P EST MOD 30 MIN: CPT | Performed by: NURSE PRACTITIONER

## 2025-02-20 PROCEDURE — 3074F SYST BP LT 130 MM HG: CPT | Performed by: NURSE PRACTITIONER

## 2025-02-20 PROCEDURE — 1125F AMNT PAIN NOTED PAIN PRSNT: CPT | Performed by: NURSE PRACTITIONER

## 2025-02-20 PROCEDURE — 1159F MED LIST DOCD IN RCRD: CPT | Performed by: NURSE PRACTITIONER

## 2025-02-20 PROCEDURE — 1160F RVW MEDS BY RX/DR IN RCRD: CPT | Performed by: NURSE PRACTITIONER

## 2025-02-20 NOTE — PATIENT INSTRUCTIONS
Patient Information:  Name: [Patient Name]  Age: 74  Medical History: The patient has a history of osteoporosis and has been taking calcium and vitamin D supplements regularly. She maintains an active lifestyle with regular walking and sit-ups. She has a family history of hip fractures in both parents.    Reason for Visit:  The patient visited to discuss the results of her recent DEXA scan and to evaluate treatment options for osteoporosis. She expressed concerns about potential side effects of medications and the risk of hip fractures.    Clinical Findings:  - The DEXA scan showed osteoporosis in the lumbar spine and osteopenia in the hips, indicating a higher risk of fractures.  - The patient has no history of dental disease and recently had a dental cleaning.    Diagnosis:  - Osteoporosis    Treatment Plan:  - The patient prefers Prolia (denosumab) injections every 6 months.  - A referral for an infusion will be arranged.  - Necessary blood work to be completed within 30 days prior to the infusion.  - Advised to continue her walking regimen and consider weight-bearing exercises like Pilates for muscle strengthening.    Follow-Up Instructions:  - Schedule and complete blood work within 30 days before the Prolia infusion.  - Continue regular walking and consider adding weight-bearing exercises like Pilates.  - Await a call to schedule the Prolia infusion.    Additional Notes:  - The patient should review the information about Prolia and other treatment options on [facility].  - The patient expressed concerns about medication side effects based on past experiences.

## 2025-02-20 NOTE — PROGRESS NOTES
Patient Name: Carleen Payne  : 1950   MRN: [id number]     Chief Complaint:    Chief Complaint   Patient presents with    Osteoporosis     Follow up DEXA scan results       History of Present Illness: Carleen Payne is a 74 y.o. female.  History of Present Illness  The patient is a 74-year-old female who presents for evaluation of osteoporosis.    Osteoporosis  - She has been on a regimen of daily calcium and vitamin D supplements for an extended period.  - She maintains an active lifestyle, engaging in regular walking exercises, although she has not been able to continue this month due to unfavorable weather conditions.  - Her routine includes walking approximately 5 miles per day, a practice she sustained throughout the spring, summer, and fall seasons until the onset of the current week.  - Additionally, she performs sit-up exercises.    Medication Concerns  - She expresses concern about potential side effects of medications, citing a past experience where she believes a new medication caused her health issues.  - This medication required her to remain upright for 30 minutes post-administration and was taken on an empty stomach first thing in the morning.  - She adhered to this regimen for a year and suspects it may have contributed to her current condition.  - She also mentions that she has heard of instances where this medication has led to thigh bone fractures.    Supplemental Information: She is apprehensive about the possibility of sustaining a hip fracture, given that both her parents experienced such injuries.  - She reports no history of dental disease and recently underwent a dental cleaning procedure.    SOCIAL HISTORY  She does not smoke or drink alcohol.    FAMILY HISTORY  Both her mother and father had hip fractures.    MEDICATIONS  Current: calcium, vitamin D     DEXA Bone Density Axial (2025 08:15) Answers submitted by the patient for this visit:  Problem not listed (Submitted on  "2/18/2025)  Chief Complaint: Other medical problem  Reason for appointment: Medicare follow up  anorexia: No  joint pain: Yes  change in stool: No  joint swelling: Yes  swollen glands: Yes  vertigo: No  visual change: No  Onset: 1 to 6 months      Subjective     Review of System: Review of Systems     Medications:     Current Outpatient Medications:     atorvastatin (LIPITOR) 10 MG tablet, TAKE 1 TABLET DAILY, Disp: 90 tablet, Rfl: 3    Calcium 150 MG tablet, Take 150 mg by mouth Daily., Disp: , Rfl:     Cholecalciferol (VITAMIN D) 1000 UNITS tablet, Take 1 tablet by mouth Daily., Disp: , Rfl:     cholestyramine (QUESTRAN) 4 GM/DOSE powder, Take 1 packet by mouth 2 (Two) Times a Day. mixed with a liquid, Disp: 378 g, Rfl: 11    EPINEPHrine (EPIPEN) 0.3 MG/0.3ML solution auto-injector injection, INJECT INTRAMUSCULARLY AS NEEDED FOR ALLERGIC REACTION, Disp: , Rfl:     loratadine (CLARITIN) 10 MG tablet, TAKE 1 TABLET DAILY, Disp: 90 tablet, Rfl: 3    losartan (COZAAR) 50 MG tablet, TAKE 1 TABLET BY MOUTH DAILY, Disp: 90 tablet, Rfl: 1    metoprolol succinate XL (TOPROL-XL) 50 MG 24 hr tablet, Take 1 tablet by mouth Daily., Disp: 90 tablet, Rfl: 1    Allergies:   Allergies   Allergen Reactions    Erythromycin Base Nausea And Vomiting    Gabapentin Swelling     Body swelling    Azithromycin GI Intolerance    Isothiazolinone Chloride Rash       Objective     Physical Exam:   Vital Signs:   Vitals:    02/20/25 1129   BP: 124/86   BP Location: Right arm   Patient Position: Sitting   Cuff Size: Adult   Pulse: 84   Resp: 16   Temp: 97.3 °F (36.3 °C)   TempSrc: Infrared   Weight: 49.6 kg (109 lb 6.4 oz)   Height: 157.5 cm (62\")   PainSc: 4      Body mass index is 20.01 kg/m². BMI is within normal parameters. No other follow-up for BMI required.      Physical Exam  Constitutional:       General: She is not in acute distress.     Appearance: She is not ill-appearing.   HENT:      Head: Normocephalic.   Cardiovascular:      Rate " and Rhythm: Normal rate and regular rhythm.      Heart sounds: Normal heart sounds. No murmur heard.  Pulmonary:      Breath sounds: Normal breath sounds.   Neurological:      General: No focal deficit present.      Mental Status: She is oriented to person, place, and time.   Psychiatric:         Mood and Affect: Mood normal.       Physical Exam         Results  Imaging  DEXA scan shows osteoporosis in lumbar spine and osteopenia in hips.     Assessment / Plan      Assessment/Plan:   There are no diagnoses linked to this encounter.   Assessment & Plan  1. Osteoporosis  - DEXA scan results indicate osteoporosis in the lumbar spine and osteopenia in the hip region, increasing fracture risk  - Currently taking calcium and vitamin D daily  - Discussed various treatment options, including bisphosphonates and Evenity (romosozumab) (rare side effect of femur fracture in 7 years or greater of therapy; will plan to trial 2-3 years and re-evaluate)  - Reviewed potential side effects of medications  - Patient prefers Prolia (denosumab) injections every 6 months  - Referral for an infusion will be arranged  - Necessary blood work to be completed within 30 days prior to infusion  - Advised to continue walking regimen  - Consider weight-bearing exercises like Pilates for muscle strengthening       Explained and discussed patient's condition and plan of care including labs and radiology that may be ordered.  Discussed when to follow-up.  Discussed possible red flags and how to follow-up with those.  Viewed patient's medications and discussed common side effects and symptoms to report. Patient to continue current medications as advised.  Be compliant with medications. Patient to call clinic if they have any worsening, no improvement, does not tolerate medication, or any future concerns about treatment.  Questions and concerns addressed at this appointment.  Patient to report to ER for emergencies.  Patient verbalized an  understanding and agreement with plan of care.      Follow Up:   No follow-ups on file.  Patient or patient representative verbalized consent for the use of Ambient Listening during the visit with  RAYMOND Rivera for chart documentation. 2/24/2025  17:35 EST    RAYMOND Rivera  [facility] [place] [facility]

## 2025-03-05 LAB
NCCN CRITERIA FLAG: ABNORMAL
TYRER CUZICK SCORE: 2.8

## 2025-03-06 ENCOUNTER — LAB (OUTPATIENT)
Dept: LAB | Facility: HOSPITAL | Age: 75
End: 2025-03-06
Payer: MEDICARE

## 2025-03-06 ENCOUNTER — DOCUMENTATION (OUTPATIENT)
Dept: GENETICS | Facility: HOSPITAL | Age: 75
End: 2025-03-06
Payer: MEDICARE

## 2025-03-06 DIAGNOSIS — M81.6 LOCALIZED OSTEOPOROSIS WITHOUT CURRENT PATHOLOGICAL FRACTURE: ICD-10-CM

## 2025-03-06 LAB
ALBUMIN SERPL-MCNC: 4.3 G/DL (ref 3.5–5.2)
ALBUMIN/GLOB SERPL: 1.7 G/DL
ALP SERPL-CCNC: 86 U/L (ref 39–117)
ALT SERPL W P-5'-P-CCNC: 20 U/L (ref 1–33)
ANION GAP SERPL CALCULATED.3IONS-SCNC: 12 MMOL/L (ref 5–15)
AST SERPL-CCNC: 27 U/L (ref 1–32)
BILIRUB SERPL-MCNC: 0.4 MG/DL (ref 0–1.2)
BUN SERPL-MCNC: 13 MG/DL (ref 8–23)
BUN/CREAT SERPL: 19.7 (ref 7–25)
CALCIUM SPEC-SCNC: 9.2 MG/DL (ref 8.6–10.5)
CHLORIDE SERPL-SCNC: 105 MMOL/L (ref 98–107)
CO2 SERPL-SCNC: 24 MMOL/L (ref 22–29)
CREAT SERPL-MCNC: 0.66 MG/DL (ref 0.57–1)
EGFRCR SERPLBLD CKD-EPI 2021: 93.3 ML/MIN/1.73
GLOBULIN UR ELPH-MCNC: 2.5 GM/DL
GLUCOSE SERPL-MCNC: 100 MG/DL (ref 65–99)
MAGNESIUM SERPL-MCNC: 2.2 MG/DL (ref 1.6–2.4)
PHOSPHATE SERPL-MCNC: 4 MG/DL (ref 2.5–4.5)
POTASSIUM SERPL-SCNC: 4.1 MMOL/L (ref 3.5–5.2)
PROT SERPL-MCNC: 6.8 G/DL (ref 6–8.5)
SODIUM SERPL-SCNC: 141 MMOL/L (ref 136–145)

## 2025-03-06 PROCEDURE — 83735 ASSAY OF MAGNESIUM: CPT

## 2025-03-06 PROCEDURE — 84100 ASSAY OF PHOSPHORUS: CPT

## 2025-03-06 PROCEDURE — 82306 VITAMIN D 25 HYDROXY: CPT

## 2025-03-06 PROCEDURE — 36415 COLL VENOUS BLD VENIPUNCTURE: CPT

## 2025-03-06 PROCEDURE — 80053 COMPREHEN METABOLIC PANEL: CPT

## 2025-03-07 LAB — 25(OH)D3 SERPL-MCNC: 60.3 NG/ML (ref 30–100)

## 2025-03-09 ENCOUNTER — RESULTS FOLLOW-UP (OUTPATIENT)
Dept: INTERNAL MEDICINE | Facility: CLINIC | Age: 75
End: 2025-03-09
Payer: MEDICARE

## 2025-03-11 ENCOUNTER — INFUSION (OUTPATIENT)
Dept: ONCOLOGY | Facility: HOSPITAL | Age: 75
End: 2025-03-11
Payer: MEDICARE

## 2025-03-11 VITALS
RESPIRATION RATE: 16 BRPM | SYSTOLIC BLOOD PRESSURE: 128 MMHG | TEMPERATURE: 98.1 F | HEART RATE: 80 BPM | DIASTOLIC BLOOD PRESSURE: 82 MMHG

## 2025-03-11 DIAGNOSIS — M81.6 LOCALIZED OSTEOPOROSIS WITHOUT CURRENT PATHOLOGICAL FRACTURE: Primary | ICD-10-CM

## 2025-03-11 PROCEDURE — 96372 THER/PROPH/DIAG INJ SC/IM: CPT

## 2025-03-11 PROCEDURE — 25010000002 DENOSUMAB 60 MG/ML SOLUTION PREFILLED SYRINGE: Performed by: NURSE PRACTITIONER

## 2025-03-11 RX ADMIN — DENOSUMAB 60 MG: 60 INJECTION SUBCUTANEOUS at 14:08

## 2025-03-19 DIAGNOSIS — J30.9 ALLERGIC RHINITIS, UNSPECIFIED SEASONALITY, UNSPECIFIED TRIGGER: ICD-10-CM

## 2025-03-19 RX ORDER — LORATADINE 10 MG/1
10 TABLET ORAL DAILY
Qty: 90 TABLET | Refills: 0 | Status: SHIPPED | OUTPATIENT
Start: 2025-03-19

## 2025-03-20 ENCOUNTER — HOSPITAL ENCOUNTER (OUTPATIENT)
Dept: MAMMOGRAPHY | Facility: HOSPITAL | Age: 75
Discharge: HOME | End: 2025-03-20
Admitting: NURSE PRACTITIONER
Payer: MEDICARE

## 2025-03-20 DIAGNOSIS — Z12.31 ENCOUNTER FOR SCREENING MAMMOGRAM FOR BREAST CANCER: ICD-10-CM

## 2025-03-20 PROCEDURE — 77063 BREAST TOMOSYNTHESIS BI: CPT

## 2025-03-20 PROCEDURE — 77067 SCR MAMMO BI INCL CAD: CPT

## 2025-03-24 ENCOUNTER — OFFICE VISIT (OUTPATIENT)
Dept: PAIN MEDICINE | Facility: CLINIC | Age: 75
End: 2025-03-24
Payer: MEDICARE

## 2025-03-24 VITALS — HEIGHT: 62 IN | BODY MASS INDEX: 20.61 KG/M2 | WEIGHT: 112 LBS

## 2025-03-24 DIAGNOSIS — M43.16 SPONDYLOLISTHESIS OF LUMBAR REGION: ICD-10-CM

## 2025-03-24 DIAGNOSIS — M51.362 DEGENERATION OF INTERVERTEBRAL DISC OF LUMBAR REGION WITH DISCOGENIC BACK PAIN AND LOWER EXTREMITY PAIN: ICD-10-CM

## 2025-03-24 DIAGNOSIS — M81.0 OSTEOPOROSIS, UNSPECIFIED OSTEOPOROSIS TYPE, UNSPECIFIED PATHOLOGICAL FRACTURE PRESENCE: ICD-10-CM

## 2025-03-24 DIAGNOSIS — Z87.81 HISTORY OF COMPRESSION FRACTURE OF VERTEBRAL COLUMN: ICD-10-CM

## 2025-03-24 DIAGNOSIS — M48.062 LUMBAR STENOSIS WITH NEUROGENIC CLAUDICATION: Primary | ICD-10-CM

## 2025-03-24 DIAGNOSIS — M48.062 LUMBAR STENOSIS WITH NEUROGENIC CLAUDICATION: ICD-10-CM

## 2025-03-24 DIAGNOSIS — M54.16 LUMBAR RADICULOPATHY: ICD-10-CM

## 2025-03-24 PROCEDURE — 99213 OFFICE O/P EST LOW 20 MIN: CPT | Performed by: NURSE PRACTITIONER

## 2025-03-24 PROCEDURE — 1160F RVW MEDS BY RX/DR IN RCRD: CPT | Performed by: NURSE PRACTITIONER

## 2025-03-24 PROCEDURE — 1125F AMNT PAIN NOTED PAIN PRSNT: CPT | Performed by: NURSE PRACTITIONER

## 2025-03-24 PROCEDURE — 1159F MED LIST DOCD IN RCRD: CPT | Performed by: NURSE PRACTITIONER

## 2025-03-24 NOTE — PROGRESS NOTES
"Chief Complaint: \"Lower back and right leg pain.\"       History of Present Illness:   Patient: Ms. Carleen Payne, 74 y.o. female originally referred by Dr. Priyanka Guardado in consultation for intractable chronic lower back pain. Patient reports a longstanding history of lower back pain, which began after a fall.  She has continued to respond exceptionally well to conservative and interventional pain management measures.  She was last seen on 9/30/2024, when she underwent repeat right L4-L5 and right L5-S1 transforaminal epidural steroid injection, from which she reports experiencing 80% pain relief and functional improvement lasting 6 months.  Prior to that, on February 5, 2024 she underwent a repeat right L4-5 and right L5-S1 transforaminal epidural steroid injection, from which she experienced 80% pain relief and functional improvement lasting almost 7 months. She returns today for reevaluation of her chronic lower back and right lower extremity pain.   Pain description: constant pain with intermittent exacerbation, described as aching and burning sensation.   Radiation of pain: The lower back pain radiates into the posterior aspect of the right thigh, right calf to the ankle.    Pain intensity today: 5/10  Average pain intensity last week: 5/10  Pain intensity ranges from: 4/10 to 5/10  Aggravating factors: Pain increases with lying down, standing and walking.   Alleviating factors: Pain decreases with analgesics, sitting, and at times walking.  Associated symptoms:   Patient reports tingling, pain and weakness in the the right lower extremity.   Patient denies any new bladder or bowel problems.   Patient denies difficulties with her balance or recent falls  Pain interferes with regular activities, ADLs, and affects patient's quality of life  Pain interferes with general activities (ability to walk, stand, transition from different positions), perform activities of daily living  Pain interferes with sleep causing " sleep fragmentation   Muscle spasms  Stiffness      Review of previous therapies and additional medical records:  Carleen Payne has already failed the following measures, including:   Conservative measures: oral analgesics and physical therapy   Interventional measures: 02/26/2020: DxTx right L4-L5 and right L5-S1 TESI  08/17/2020: right L4-L5 and right L5-S1 TESI  05/12/2021: right L4-L5 and right L5-S1 TESI  09/22/2021: right L4-L5 and right L5-S1 TESI  02/28/2022: right L4-L5 and right L5-S1 TESI  08/24/2022: right L4-L5 and right L5-S1 TESI  01/25/2023: right L4-L5 and right L5-S1 TESI  09/30/2023: right L4-L5 and right L5-S1 TESI  002/07/2024: right L4-L5 and right L5-S1 TESI  09/30/2024: right L4-L5 and right L5-S1 TESI  Former patient of Dr. Talbert. She underwent epidural injections (RT L4-L5 transforaminals x4) with significant relief  Surgical measures: No history of lumbar spine surgery  Carleen Payne underwent neurosurgical consultation with Dr. Gomes on 06/16/2017, and was found not to be a surgical candidate.  Carleen Payne is a healthy adult other than her chronic pain and HTN.  In terms of current analgesics, Carleen Payne takes: aspirin PRN  I have reviewed Anatoly Report is consistent to medication reconciliation.     Global Pain Scale 06-20 2019 02-19 2020 03-23 2020 02-24 2022 09-12 2023 01-31 2024 03-24 2025     Pain  1  12  10  10  9  12  11     Feelings  0  0  0  0  0  0  0     Clinical outcomes  0  0  2  2  3  6  0     Activities  0  8  0  2  0  0  0     GPS Total:  3  20  12  14  12  18  11        The [place] Back Pain Disability Scale  DATE 09-12 2023 01-31 2024 03-24 2025             Sleep through the night 0  2  2             Turn over in bed 0  2  0             Get out of bed 0  1  0             Make your bed 0  0  0             Put on socks (pantyhose) 0  2  0             Ride in a car 0  3  2             Sit in a chair for several hours 4  4  4             Stand up  for 20-30 minutes 0  3  3             Climb one flight of stairs 0  1  0             Walk a few blocks (200-300 yards)  0  2  0             Walk several miles 0  4  0             Run one block (about 50 yards) 3  5  4             Take food out of the refrigerator 0  0  0             Reach up to high shelves 0  0  0             Move a chair 3  1  1             Pull or push heavy doors 3  3  1             Bend over to clean the bathtub 1  0  1             Throw a ball 0  0  0             Carry two bags of groceries 3  0  1             Lift and carry a heavy suitcase 4  5  4             Total score 21  41  23                   Review of Diagnostic Studies:  MRI of the lumbar spine without contrast 8/27/2022: Imaging was reviewed.  Old T12 compression fracture with 50% loss of height.  Anterolisthesis of L4 on L5.  T11-12: Posterior disc bulge with bowing of the posterior wall of the superior endplate of the T12 vertebral body.  Borderline spinal stenosis.  T12 1: Small left paracentral disc protrusion with mild facet arthritis.  L1-L2: Mild facet hypertrophy without significant spinal canal neuroforaminal stenosis.  L2-L3: Facet hypertrophy without significant spinal canal neuroforaminal stenosis.  L3-L4: Facet hypertrophy with out significant spinal canal neuroforaminal stenosis.  L4-L5: 6 mm of anterolisthesis of L4 on L5, severe facet hypertrophy creating moderate to severe spinal stenosis, with moderate bilateral neural foraminal stenosis.  L4-L5: Disc bulge with severe facet hypertrophy and mild bilateral neuroforaminal stenosis, mild spinal canal stenosis.  Lumbar spine x-ray flexion-extension views 9/22/2022: Grade 1 anterior listhesis of L4 on L5 and mild anterior listhesis of L5 on S1.  There is dynamic instability most seen in flexion view.  Multilevel spondylosis most significant at L3-L4 and L4-5.      Review of Systems   Musculoskeletal:  Positive for arthralgias and back pain.   Allergic/Immunologic:  Positive for environmental allergies.   All other systems reviewed and are negative.        Patient Active Problem List   Diagnosis    Lumbar radiculopathy    Right sided sciatica    Leg pain    Lumbar stenosis with neurogenic claudication    History of compression fracture of vertebral column    Osteoporosis    Spondylolisthesis of lumbar region    DDD (degenerative disc disease), lumbar    Mild cognitive impairment    Impetigo    Preseptal cellulitis of right lower eyelid    Dermatitis    Edema of left eyelid    Essential hypertension       Past Medical History:   Diagnosis Date    Allergic 1975    Arthritis 2005    Chronic pain disorder 03/2000    COVID 12/05/2024    Hyperlipidemia     Hypertension     Joint pain 12 years    Leg pain 09/01/2016    Low back pain 2005    Epidural treatments as needed    Lumbar stenosis with neurogenic claudication 06/16/2019    Lumbosacral disc disease 16 years    Osteopenia 2020    Osteoporosis     Shingles          Past Surgical History:   Procedure Laterality Date    COLONOSCOPY  January 2024    EPIDURAL BLOCK  5/12/2021         Family History   Problem Relation Age of Onset    Hypertension Mother     Pancreatitis Mother     Hypertension Father     Stroke Father     Heart attack Brother     Thyroid disease Daughter     Stroke Maternal Grandmother     Cancer Maternal Grandfather     Cancer Paternal Grandmother     Ovarian cancer Paternal Grandmother         60'S    Stroke Paternal Grandmother     Arthritis Brother     Ovarian cancer Paternal Aunt         60'S    Ovarian cancer Cousin         PATERNAL 1ST COUSIN, 40'S    Ovarian cancer Cousin         PATERNAL 1ST COUSIN, 60'S    Breast cancer Neg Hx          Social History     Socioeconomic History    Marital status:    Tobacco Use    Smoking status: Never     Passive exposure: Never    Smokeless tobacco: Never   Vaping Use    Vaping status: Never Used   Substance and Sexual Activity    Alcohol use: Never    Drug use: Never  "   Sexual activity: Yes     Partners: Male     Birth control/protection: Post-menopausal           Current Outpatient Medications:     atorvastatin (LIPITOR) 10 MG tablet, TAKE 1 TABLET DAILY, Disp: 90 tablet, Rfl: 3    Calcium 150 MG tablet, Take 150 mg by mouth Daily., Disp: , Rfl:     Cholecalciferol (VITAMIN D) 1000 UNITS tablet, Take 1 tablet by mouth Daily., Disp: , Rfl:     cholestyramine (QUESTRAN) 4 GM/DOSE powder, Take 1 packet by mouth 2 (Two) Times a Day. mixed with a liquid, Disp: 378 g, Rfl: 11    EPINEPHrine (EPIPEN) 0.3 MG/0.3ML solution auto-injector injection, INJECT INTRAMUSCULARLY AS NEEDED FOR ALLERGIC REACTION, Disp: , Rfl:     loratadine (CLARITIN) 10 MG tablet, TAKE 1 TABLET DAILY, Disp: 90 tablet, Rfl: 0    losartan (COZAAR) 50 MG tablet, TAKE 1 TABLET BY MOUTH DAILY, Disp: 90 tablet, Rfl: 1    metoprolol succinate XL (TOPROL-XL) 50 MG 24 hr tablet, Take 1 tablet by mouth Daily., Disp: 90 tablet, Rfl: 1      Allergies   Allergen Reactions    Erythromycin Base Nausea And Vomiting    Gabapentin Swelling     Body swelling    Azithromycin GI Intolerance    Methylisothiazolinone Unknown - High Severity     This is an ingredient in the covid vaccine     Isothiazolinone Chloride Rash         Ht 157.5 cm (62.01\")   Wt 50.8 kg (112 lb)   BMI 20.48 kg/m²           Physical Exam:   Constitutional: Patient is oriented to person, place, and time. Patient appears well-developed and well-nourished.   HEENT: Head: Normocephalic and atraumatic. Eyes: Conjunctivae and lids are normal. Pupils: Equal, round, reactive to light.   Neck: Trachea normal. Neck supple. No JVD present.   Pulmonary Respiratory effort: No increased work of breathing or signs of respiratory distress.   Peripheral vascular exam: Femoral: right 2+, left 2+. Posterior tibialis: right 2+ and left 2+. Dorsalis pedis: right 2+ and left 2+. No edema.   Musculoskeletal   Gait and station: Gait evaluation demonstrated a normal gait   Lumbar " spine: Passive and active range of motion are full and without pain. Extension, flexion, lateral flexion, rotation of the lumbar spine did not increase or reproduce pain. Lumbar facet joint loading maneuvers are negative.   Rodrigo test and Gaenslen's test are negative   Piriformis maneuvers are negative   Palpation of the bilateral ischial tuberosities, unrevealing   Palpation of the bilateral greater trochanter, unrevealing   Examination of the Iliotibial band: unrevealing   Hip joints: The range of motion of the hip joints is full and without pain   Neurological: Patient is alert and oriented to person, place, and time. Speech: speech is normal. Cortical function: Normal mental status.   Reflex Scores:  Right patellar: 2+  Left patellar: 2+  Right Achilles: 2+  Left Achilles: 2+  Motor strength: 5/5  Motor Tone: normal tone.   Involuntary movements: none.   Superficial/Primitive Reflexes: primitive reflexes were absent.   Right Carcamo: absent  Left Carcamo: absent  Right ankle clonus: absent  Left ankle clonus: absent   Negative long tract signs. Straight leg raising test on the right elicits her posterior thigh pain. Femoral stretch sign is negative.   Sensation: No sensory loss. Sensory exam: intact to light touch, intact pain and temperature sensation, intact vibration sensation and normal proprioception.   Coordination: Normal finger to nose and heel to shin. Normal balance and negative Romberg's sign   Skin and subcutaneous tissue: Skin is warm and intact. No rash noted. No cyanosis.   Psychiatric: Judgment and insight: Normal. Orientation to person, place and time: Normal. Recent and remote memory: Intact. Mood and affect: Normal.     Physical exam is unchanged.    ASSESSMENT:   1. Lumbar stenosis with neurogenic claudication    2. Lumbar radiculopathy    3. Spondylolisthesis of lumbar region    4. Degeneration of intervertebral disc of lumbar region with discogenic back pain and lower extremity pain    5.  History of compression fracture of vertebral column    6. Osteoporosis, unspecified osteoporosis type, unspecified pathological fracture presence                PLAN/MEDICAL DECISION MAKING: Ms. Carleen Payne, 74 y.o. female reports a longstanding history of lower back pain.  Most recent MRI of the lumbar spine without contrast in revealed multilevel degenerative disc disease, at L4-L5 there is severe facet hypertrophy, a grade 1 anterior listhesis of L4 on L5, this is contributing to moderate to severe spinal canal stenosis and moderate bilateral neuroforaminal stenosis.  Lumbar spine x-rays and flexion-extension views revealed instability seen L4-L5, most in flexion view.  She has responded exceptionally well to previous transforaminal epidurals, as referenced above.  She continues to exercise, and has participated in physical therapy in the past.  I had a lengthy conversation with Ms. Carleen Payne regarding her chronic pain condition and potential therapeutic options including risks, benefits, alternative therapies, to name a few. Patient has failed to obtain pain relief with conservative measures, as referenced above. Patient experiences significant pain relief from interventional pain management measures, as referenced above. I have reviewed all available patient's medical records as well as previous therapies as referenced above.   Therefore, I have proposed the following plan:  1. Pharmacological measures: Reviewed. Discussed.   A. Patient takes aspirin PRN  2. Interventional pain management measures: Patient will be scheduled for repeat right L4-L5 and right L5-S1 transforaminal epidural steroid injections. We may repeat epidurals depending on patient's outcome, or facilitate NS follow-up with Dr Gomes.   3. Long-term rehabilitation efforts:  A. The patient does not have a history of falls. I did complete a risk assessment for falls  B. Patient will start a comprehensive physical therapy program for  reconditioning, therapeutic exercise, upper body strengthening/posture correction, core strengthening, gait and balance training, neurodynamics, myofascial release, cupping and dry needling if pain recurs  C. Start an exercise program such as yoga, Pilates, Juan Daniel Chi and water therapy  D. Contrast therapy: Apply ice-packs for 15-20 minutes, followed by heating pads for 15-20 minutes to affected area   5. The patient has been instructed to contact my office with any questions or difficulties. The patient understands the plan and agrees to proceed accordingly.        Carleen Payne reports a pain score of 4.  Given her pain assessment as noted, treatment options were discussed and the following options were decided upon as a follow-up plan to address the patient's pain: continuation of current treatment plan for pain, educational materials on pain management, home exercises and therapy, patient not interested in pharmacological measures, referral to Physical Therapy, steroid injections, and use of non-medical modalities (ice, heat, stretching and/or behavior modifications).               Patient Care Team:  Luna Isidro APRN as PCP - General (Nurse Practitioner)  Herve Philippe MD as Consulting Physician (Pain Medicine)  Flores Stratton APRN as Nurse Practitioner (Pain Medicine)  Stephen Crockett MD as Consulting Physician (Gastroenterology)     No orders of the defined types were placed in this encounter.        Future Appointments   Date Time Provider Department Center   3/27/2025 10:00 AM Luna Isidro APRN [facility]   6/17/2025  3:15 PM Stephen Crockett MD [facility]   9/9/2025  3:00 PM  [facility]  [facility]         RAYMOND Lorenzana

## 2025-03-26 ENCOUNTER — OUTSIDE FACILITY SERVICE (OUTPATIENT)
Dept: PAIN MEDICINE | Facility: CLINIC | Age: 75
End: 2025-03-26
Payer: MEDICARE

## 2025-03-26 PROCEDURE — 64484 NJX AA&/STRD TFRM EPI L/S EA: CPT | Performed by: ANESTHESIOLOGY

## 2025-03-26 PROCEDURE — 64483 NJX AA&/STRD TFRM EPI L/S 1: CPT | Performed by: ANESTHESIOLOGY

## 2025-03-27 ENCOUNTER — OFFICE VISIT (OUTPATIENT)
Dept: INTERNAL MEDICINE | Facility: CLINIC | Age: 75
End: 2025-03-27
Payer: MEDICARE

## 2025-03-27 VITALS
BODY MASS INDEX: 20.43 KG/M2 | HEART RATE: 96 BPM | DIASTOLIC BLOOD PRESSURE: 84 MMHG | WEIGHT: 111 LBS | SYSTOLIC BLOOD PRESSURE: 132 MMHG | TEMPERATURE: 97.1 F | RESPIRATION RATE: 16 BRPM | HEIGHT: 62 IN

## 2025-03-27 DIAGNOSIS — I10 ESSENTIAL HYPERTENSION: ICD-10-CM

## 2025-03-27 DIAGNOSIS — M81.6 LOCALIZED OSTEOPOROSIS WITHOUT CURRENT PATHOLOGICAL FRACTURE: ICD-10-CM

## 2025-03-27 DIAGNOSIS — E78.2 MIXED HYPERLIPIDEMIA: Primary | ICD-10-CM

## 2025-03-27 DIAGNOSIS — M54.2 NECK PAIN: ICD-10-CM

## 2025-03-27 NOTE — PROGRESS NOTES
Patient Name: Carleen Payne  : 1950   MRN: [id number]     Chief Complaint:    Chief Complaint   Patient presents with    Hypertension     Follow up       History of Present Illness: Carleen Payne is a 74 y.o. female.  History of Present Illness  The patient presents for a 6-month follow-up.    Osteoporosis Treatment  - She has initiated her Prolia treatment for osteoporosis, receiving one injection which she tolerated well.  - She is scheduled for another injection in the coming months.    Blood Pressure Management  - Her blood pressure is well-managed with her current medication regimen.  - She reports no chest pain, palpitations, or shortness of breath.  - She is on losartan 50 mg and metoprolol 50 mg, both of which she tolerates without any adverse effects.    Cholesterol Management  - She is on a consistent regimen of cholesterol medication.  - Her cholesterol levels remain elevated.    Diarrhea  - She is currently taking Questran powder twice daily, as prescribed by Dr. Crockett.  - This treatment has been beneficial, reducing her bowel movements from 6 to 7 times daily to 3 to 4 times daily.  - She reports no side effects from this medication.    Back Pain  - She received an epidural injection yesterday for her back pain.  - She has two crushed vertebrae at L4 and L5, which occurred in .  - She did not receive any help until  or .  - She is requesting exercises for her neck as she is developing a humpback.    Supplemental information: None    MEDICATIONS  Current: Prolia, losartan, metoprolol, Questran powder         Subjective     Review of System: Review of Systems     Medications:     Current Outpatient Medications:     atorvastatin (LIPITOR) 10 MG tablet, TAKE 1 TABLET DAILY, Disp: 90 tablet, Rfl: 3    Calcium 150 MG tablet, Take 150 mg by mouth Daily., Disp: , Rfl:     Cholecalciferol (VITAMIN D) 1000 UNITS tablet, Take 1 tablet by mouth Daily., Disp: , Rfl:     cholestyramine  "(QUESTRAN) 4 GM/DOSE powder, Take 1 packet by mouth 2 (Two) Times a Day. mixed with a liquid, Disp: 378 g, Rfl: 11    EPINEPHrine (EPIPEN) 0.3 MG/0.3ML solution auto-injector injection, INJECT INTRAMUSCULARLY AS NEEDED FOR ALLERGIC REACTION, Disp: , Rfl:     loratadine (CLARITIN) 10 MG tablet, TAKE 1 TABLET DAILY, Disp: 90 tablet, Rfl: 0    losartan (COZAAR) 50 MG tablet, TAKE 1 TABLET BY MOUTH DAILY, Disp: 90 tablet, Rfl: 1    metoprolol succinate XL (TOPROL-XL) 50 MG 24 hr tablet, Take 1 tablet by mouth Daily., Disp: 90 tablet, Rfl: 1    Allergies:   Allergies   Allergen Reactions    Erythromycin Base Nausea And Vomiting    Gabapentin Swelling     Body swelling    Azithromycin GI Intolerance    Methylisothiazolinone Unknown - High Severity     This is an ingredient in the covid vaccine     Isothiazolinone Chloride Rash       Objective     Physical Exam:   Vital Signs:   Vitals:    03/27/25 1006   BP: 132/84   BP Location: Right arm   Patient Position: Sitting   Cuff Size: Adult   Pulse: 96   Resp: 16   Temp: 97.1 °F (36.2 °C)   TempSrc: Infrared   Weight: 50.3 kg (111 lb)   Height: 157.5 cm (62\")   PainSc: 0-No pain     Body mass index is 20.3 kg/m². BMI is within normal parameters. No other follow-up for BMI required.      Physical Exam  Constitutional:       General: She is not in acute distress.     Appearance: She is not ill-appearing.   HENT:      Head: Normocephalic.   Cardiovascular:      Rate and Rhythm: Normal rate and regular rhythm.      Heart sounds: Normal heart sounds. No murmur heard.  Pulmonary:      Breath sounds: Normal breath sounds.   Abdominal:      General: Bowel sounds are normal.      Tenderness: There is no abdominal tenderness.   Neurological:      General: No focal deficit present.      Mental Status: She is oriented to person, place, and time.   Psychiatric:         Mood and Affect: Mood normal.       Physical Exam         Results       Assessment / Plan      Assessment/Plan: "   Diagnoses and all orders for this visit:    1. Mixed hyperlipidemia (Primary)    2. Essential hypertension    3. Localized osteoporosis without current pathological fracture    4. Neck pain       Assessment & Plan  1. Osteoporosis:  - Started Prolia injections, tolerated well  - Next Prolia injection scheduled in a few months  - DEXA scan in 2 to 3 years to monitor bone density    2. Hypertension:  - Blood pressure well-controlled with losartan 50 mg and metoprolol 50 mg  - No reported side effects  - Continue current medication regimen    3. Hypercholesterolemia:  - Taking cholesterol medication consistently  - Cholesterol levels remain high  - Cholesterol levels to be checked during annual visit    4. Neck pain  - Requesting exercises for neck due to developing humpback    PROCEDURE  The patient received an epidural injection yesterday for back pain.           Follow Up:   Return in about 6 months (around 9/29/2025) for Annual, Medicare Wellness.  Patient or patient representative verbalized consent for the use of Ambient Listening during the visit with  RAYMOND Rivera for chart documentation. 3/27/2025  14:49 EDT    RAYMOND Rivera  [facility]

## 2025-06-17 DIAGNOSIS — J30.9 ALLERGIC RHINITIS, UNSPECIFIED SEASONALITY, UNSPECIFIED TRIGGER: ICD-10-CM

## 2025-06-17 RX ORDER — LORATADINE 10 MG/1
10 TABLET ORAL DAILY
Qty: 90 TABLET | Refills: 3 | Status: SHIPPED | OUTPATIENT
Start: 2025-06-17

## 2025-07-29 ENCOUNTER — TELEMEDICINE (OUTPATIENT)
Dept: GASTROENTEROLOGY | Facility: CLINIC | Age: 75
End: 2025-07-29
Payer: MEDICARE

## 2025-07-29 DIAGNOSIS — K52.9 CHRONIC DIARRHEA: Primary | ICD-10-CM

## 2025-07-29 RX ORDER — LOPERAMIDE HYDROCHLORIDE 2 MG/1
2 CAPSULE ORAL AS NEEDED
Qty: 120 CAPSULE | Refills: 3 | Status: SHIPPED | OUTPATIENT
Start: 2025-07-29

## 2025-07-29 NOTE — PROGRESS NOTES
[facility]    Referring Provider: No ref. provider found        Chief complaint f/u  You have chosen to receive care through a telehealth visit.  Do you consent to use a video/audio connection for your medical care today? Yes      History of present illness:  Carleen Payne is a 75 y.o. female who presents as a f/u to GI clinic via telemedicine.  Past history of post infectious IBS-d. Stopped questran due to her hair falling out easily. This improved upon cessation after about a month. She is having 3-4 loose bowels a day.    Allergies:  Erythromycin base, Gabapentin, Azithromycin, Methylisothiazolinone, and Isothiazolinone chloride    Scheduled Meds:       Infusions:  No current facility-administered medications for this visit.      PRN Meds:      Home Meds:  (Not in a hospital admission)      ROS: Review of Systems  A complete 12 point ros was asked and is negative except for that mentioned above.  In particular:  No fever  No rash  No increased arthralgias  No worsening edema  No cough  No dyspnea  No chest pain    All other systems reviewed and are negative.    PAST MED HX: Pt  has a past medical history of Allergic (1975), Arthritis (2005), Chronic pain disorder (03/2000), COVID (12/05/2024), Hyperlipidemia, Hypertension, Joint pain (12 years), Leg pain (09/01/2016), Low back pain (2005), Lumbar stenosis with neurogenic claudication (06/16/2019), Lumbosacral disc disease (16 years), Osteopenia (2020), Osteoporosis, and Shingles.  PAST SURG HX: Pt  has a past surgical history that includes Epidural block injection (5/12/2021) and Colonoscopy (January 2024).  FAM HX: family history includes Arthritis in her brother; Cancer in her maternal grandfather and paternal grandmother; Heart attack in her brother; Hypertension in her father and mother; Ovarian cancer in her cousin, cousin, paternal aunt, and paternal grandmother; Pancreatitis in her mother; Stroke in her father, maternal grandmother, and  paternal grandmother; Thyroid disease in her daughter.  SOC HX: Pt  reports that she has never smoked. She has never been exposed to tobacco smoke. She has never used smokeless tobacco. She reports that she does not drink alcohol and does not use drugs.    There were no vitals taken for this visit.    Physical Exam  Wt Readings from Last 3 Encounters:   03/27/25 50.3 kg (111 lb)   03/24/25 50.8 kg (112 lb)   02/20/25 49.6 kg (109 lb 6.4 oz)   ,body mass index is unknown because there is no height or weight on file.      General: well developed, well nourished  A+O x 3 NAD  HEENT: NCAT, pupils equal appearing, sclera appear white  NECK: full ROM  Respiratory: symmetric chest rise, normal effort, normal work of breathing, no overt rales  Abomen: non-distended  Skin: normal color, no jaundice  Neuro: no tremor, no facial droop  Psych: normal mood and affect            Results Review:   I reviewed the patient's new clinical results.    Lab Results   Component Value Date    WBC 6.96 09/27/2024    HGB 14.2 09/27/2024    HCT 41.5 09/27/2024    MCV 93.7 09/27/2024     09/27/2024       Lab Results   Component Value Date    GLUCOSE 100 (H) 03/06/2025    BUN 13 03/06/2025    CREATININE 0.66 03/06/2025    EGFRIFNONA 66 09/20/2021    BCR 19.7 03/06/2025    CO2 24.0 03/06/2025    CALCIUM 9.2 03/06/2025    ALBUMIN 4.3 03/06/2025    AST 27 03/06/2025    ALT 20 03/06/2025       ASSESSMENTS/PLANS  Workup: colonoscopy, 12/2023: tortuous colon, diverticulosis left sided, 3 mm polyp: benign, repeat 2028  Last seen for LUQ pain 2019, luq discomfort, 1 bm q 2 weeks on average which is not bothersome to her, recommended miralax at that time.  Egd: 2/2024    1.) Chronic luq pain  Workup: egd  Bentyl worsened pain  - suspect post infectious IBS  - rx prn levsin      2.) h/o giardia  No sign of recurrence    3.) h/o constipation  Has used miralax in the past, on hold due to giardia and persistent diarrhea    4.) HCM  Repeat colonoscopy  2028    5.) Chronic diarrhea, post infectious IBS-d  Hold miralax,  Workup: fecal simón: normal, (-) c.dif, GI pcr (-) including giardia  Questran helped but she attributed hair loss which improved when she stopped  Plan:  Immodium 2 mg po q day, caution for constipation    consider antibiotics/probiotics followed by colonoscopy if no improvement          I discussed the patients findings and my recommendations with the patient    Stephen Crockett MD  07/29/25  12:42 EDT

## 2025-08-28 ENCOUNTER — OFFICE VISIT (OUTPATIENT)
Dept: PAIN MEDICINE | Facility: CLINIC | Age: 75
End: 2025-08-28
Payer: MEDICARE

## 2025-08-28 VITALS — HEIGHT: 62 IN | BODY MASS INDEX: 20.24 KG/M2 | WEIGHT: 110 LBS

## 2025-08-28 DIAGNOSIS — M43.16 SPONDYLOLISTHESIS OF LUMBAR REGION: ICD-10-CM

## 2025-08-28 DIAGNOSIS — M48.062 LUMBAR STENOSIS WITH NEUROGENIC CLAUDICATION: ICD-10-CM

## 2025-08-28 DIAGNOSIS — Z87.81 HISTORY OF COMPRESSION FRACTURE OF VERTEBRAL COLUMN: ICD-10-CM

## 2025-08-28 DIAGNOSIS — M54.16 LUMBAR RADICULOPATHY: ICD-10-CM

## 2025-08-28 PROCEDURE — 1125F AMNT PAIN NOTED PAIN PRSNT: CPT | Performed by: NURSE PRACTITIONER

## 2025-08-28 PROCEDURE — 1160F RVW MEDS BY RX/DR IN RCRD: CPT | Performed by: NURSE PRACTITIONER

## 2025-08-28 PROCEDURE — 1159F MED LIST DOCD IN RCRD: CPT | Performed by: NURSE PRACTITIONER

## 2025-08-28 PROCEDURE — 99214 OFFICE O/P EST MOD 30 MIN: CPT | Performed by: NURSE PRACTITIONER
